# Patient Record
Sex: FEMALE | Race: WHITE | NOT HISPANIC OR LATINO | Employment: FULL TIME | URBAN - METROPOLITAN AREA
[De-identification: names, ages, dates, MRNs, and addresses within clinical notes are randomized per-mention and may not be internally consistent; named-entity substitution may affect disease eponyms.]

---

## 2017-06-30 ENCOUNTER — OFFICE VISIT (OUTPATIENT)
Dept: GASTROENTEROLOGY | Facility: CLINIC | Age: 82
End: 2017-06-30

## 2017-06-30 ENCOUNTER — TELEPHONE (OUTPATIENT)
Dept: GASTROENTEROLOGY | Facility: CLINIC | Age: 82
End: 2017-06-30

## 2017-06-30 VITALS
WEIGHT: 188 LBS | BODY MASS INDEX: 34.6 KG/M2 | TEMPERATURE: 98 F | HEIGHT: 62 IN | DIASTOLIC BLOOD PRESSURE: 78 MMHG | SYSTOLIC BLOOD PRESSURE: 132 MMHG

## 2017-06-30 DIAGNOSIS — Z86.010 HISTORY OF COLON POLYPS: Primary | ICD-10-CM

## 2017-06-30 DIAGNOSIS — K59.00 CONSTIPATION, UNSPECIFIED CONSTIPATION TYPE: ICD-10-CM

## 2017-06-30 DIAGNOSIS — R10.32 LEFT LOWER QUADRANT PAIN: ICD-10-CM

## 2017-06-30 PROCEDURE — 99204 OFFICE O/P NEW MOD 45 MIN: CPT | Performed by: INTERNAL MEDICINE

## 2017-06-30 RX ORDER — ALBUTEROL SULFATE 2.5 MG/3ML
SOLUTION RESPIRATORY (INHALATION)
COMMUNITY
Start: 2017-05-15 | End: 2018-07-31

## 2017-06-30 RX ORDER — ALBUTEROL SULFATE 90 UG/1
2 AEROSOL, METERED RESPIRATORY (INHALATION) EVERY 6 HOURS PRN
COMMUNITY
Start: 2016-02-11

## 2017-06-30 RX ORDER — OMEPRAZOLE 20 MG/1
20 CAPSULE, DELAYED RELEASE ORAL EVERY MORNING
COMMUNITY
Start: 2016-02-09 | End: 2018-09-10 | Stop reason: SDUPTHER

## 2017-06-30 RX ORDER — MONTELUKAST SODIUM 10 MG/1
10 TABLET ORAL
COMMUNITY
Start: 2016-02-09 | End: 2017-09-26 | Stop reason: ALTCHOICE

## 2017-06-30 RX ORDER — POLYETHYLENE GLYCOL 3350 17 G/17G
17 POWDER, FOR SOLUTION ORAL DAILY
Qty: 850 G | Refills: 2 | Status: SHIPPED | OUTPATIENT
Start: 2017-06-30 | End: 2017-09-26 | Stop reason: SDUPTHER

## 2017-06-30 RX ORDER — LOSARTAN POTASSIUM 100 MG/1
TABLET ORAL
Refills: 3 | COMMUNITY
Start: 2017-04-26 | End: 2017-08-09

## 2017-06-30 RX ORDER — MELOXICAM 7.5 MG/1
7.5 TABLET ORAL
COMMUNITY
Start: 2016-09-08 | End: 2018-01-24

## 2017-06-30 RX ORDER — SODIUM CHLORIDE, SODIUM LACTATE, POTASSIUM CHLORIDE, CALCIUM CHLORIDE 600; 310; 30; 20 MG/100ML; MG/100ML; MG/100ML; MG/100ML
30 INJECTION, SOLUTION INTRAVENOUS CONTINUOUS
Status: CANCELLED | OUTPATIENT
Start: 2017-06-30

## 2017-06-30 RX ORDER — LOSARTAN POTASSIUM 100 MG/1
TABLET ORAL
COMMUNITY
Start: 2017-01-21 | End: 2018-05-08 | Stop reason: SDUPTHER

## 2017-06-30 RX ORDER — LEVOTHYROXINE SODIUM 0.03 MG/1
25 TABLET ORAL DAILY
COMMUNITY
Start: 2016-02-09 | End: 2020-05-13 | Stop reason: SDUPTHER

## 2017-06-30 RX ORDER — SODIUM CHLORIDE 0.9 % (FLUSH) 0.9 %
1-10 SYRINGE (ML) INJECTION AS NEEDED
Status: CANCELLED | OUTPATIENT
Start: 2017-06-30

## 2017-06-30 RX ORDER — TIMOLOL MALEATE 5 MG/ML
SOLUTION/ DROPS OPHTHALMIC
COMMUNITY
Start: 2016-12-26 | End: 2017-09-26 | Stop reason: ALTCHOICE

## 2017-06-30 RX ORDER — SIMVASTATIN 40 MG
TABLET ORAL
COMMUNITY
Start: 2017-06-15 | End: 2017-10-14 | Stop reason: SDUPTHER

## 2017-06-30 NOTE — PATIENT INSTRUCTIONS
Schedule the colonoscopy    Daily miralax for constipation    For any additional questions, concerns or changes to your condition after today's office visit please contact the office at [phone].

## 2017-06-30 NOTE — PROGRESS NOTES
Chief Complaint   Patient presents with   • fatique   • Constipation   • Nausea     gas & bloating       Subjective     HPI    SR Ghada Ryan is a 81 y.o. female with a past medical history noted below who presents for evaluation of constipation, bloating, and fatigue.  She reports a history of polyps and her brother was just diagnosed with colon cancer-- he is 76.  Her last colonoscopy was about 3 years ago at [facility] in [place].  She had polyps then But does not recall when she was told to come back for repeat screening.  She is very concerned given her brother's past history.  She has had no change in her bowel habits.  She has not seen any blood in her stools.  She has had some increased fatigue. She feels this has caused her to eat more and she feels she has gained weight.  She has LLQ pain intermittently that is possibly related to constipation. She has daily BMs but frequently feels that she struggles to pass the stools. Takes senna as needed.  She is not on a daily regimen.  Heartburn controlled with omeprazole.    She is otherwise fairly healthy and stays active.  She is a retired nurse    Review of labs from [facility] show a mild anemia from January with normocytic indices.  She has an elevated parathyroid hormone and has follow up for that later this month.            Past Medical History:   Diagnosis Date   • GERD (gastroesophageal reflux disease)    • Glaucoma    • Hyperlipidemia    • Hypertension    • Sleep apnea    • Thyroid disease          Current Outpatient Prescriptions:   •  beclomethasone (QVAR) 80 MCG/ACT inhaler, INHALE 1 PUFF INTO THE LUNGS 2 (TWO) TIMES DAILY, Disp: , Rfl:   •  levothyroxine (SYNTHROID, LEVOTHROID) 25 MCG tablet, Take 25 mcg by mouth., Disp: , Rfl:   •  losartan (COZAAR) 100 MG tablet, TAKE 1 TABLET BY MOUTH DAILY, Disp: , Rfl:   •  losartan (COZAAR) 100 MG tablet, TAKE 1 TABLET BY MOUTH EVERY DAY, Disp: , Rfl: 3  •  meloxicam (MOBIC) 7.5 MG  tablet, Take 7.5 mg by mouth., Disp: , Rfl:   •  montelukast (SINGULAIR) 10 MG tablet, Take 10 mg by mouth., Disp: , Rfl:   •  omeprazole (priLOSEC) 20 MG capsule, Take 20 mg by mouth., Disp: , Rfl:   •  simvastatin (ZOCOR) 40 MG tablet, TAKE 1 TABLET BY MOUTH NIGHTLY, Disp: , Rfl:   •  timolol (TIMOPTIC) 0.5 % ophthalmic solution, INSTILL 1 DROP IN BOTH EYES TWICE A DAY, Disp: , Rfl:   •  albuterol (PROVENTIL HFA;VENTOLIN HFA) 108 (90 BASE) MCG/ACT inhaler, Inhale 2 puffs., Disp: , Rfl:   •  albuterol (PROVENTIL) (2.5 MG/3ML) 0.083% nebulizer solution, INHALE 3 MILLILITERS (2.5 MG) BY NEBULIZATION ROUTE EVERY 6 HOURS AS NEEDED, Disp: , Rfl:   •  polyethylene glycol (MIRALAX) powder, Take 17 g by mouth Daily., Disp: 850 g, Rfl: 2    Allergies   Allergen Reactions   • Contrast Dye        Social History     Social History   • Marital status: Single     Spouse name: N/A   • Number of children: N/A   • Years of education: N/A     Occupational History   • Not on file.     Social History Main Topics   • Smoking status: Never Smoker   • Smokeless tobacco: Not on file   • Alcohol use 0.6 oz/week     1 Glasses of wine per week      Comment: rare   • Drug use: No   • Sexual activity: Not on file     Other Topics Concern   • Not on file     Social History Narrative   • No narrative on file       Family History   Problem Relation Age of Onset   • Tuberculosis Mother    • Kidney cancer Father        Review of Systems   Constitutional: Positive for fatigue. Negative for activity change and appetite change.   HENT: Negative for sore throat and trouble swallowing.    Respiratory: Negative.    Cardiovascular: Negative.    Gastrointestinal: Positive for constipation. Negative for abdominal distention, abdominal pain, blood in stool, nausea and vomiting.   Endocrine: Negative for cold intolerance and heat intolerance.   Genitourinary: Negative for difficulty urinating, dysuria and frequency.   Musculoskeletal: Positive for  arthralgias. Negative for back pain and myalgias.   Skin: Negative.    Hematological: Negative for adenopathy. Does not bruise/bleed easily.   All other systems reviewed and are negative.      Objective     Vitals:    06/30/17 0938   BP: 132/78   Temp: 98 °F (36.7 °C)     Last 2 weights    06/30/17 0938   Weight: 188 lb (85.3 kg)     Body mass index is 34.39 kg/(m^2).    Physical Exam   Constitutional: She is oriented to person, place, and time. She appears well-developed and well-nourished. No distress.   HENT:   Head: Normocephalic and atraumatic.   Right Ear: External ear normal.   Left Ear: External ear normal.   Nose: Nose normal.   Mouth/Throat: Oropharynx is clear and moist.   Eyes: Conjunctivae and EOM are normal. Right eye exhibits no discharge. Left eye exhibits no discharge. No scleral icterus.   Neck: Normal range of motion. Neck supple. No thyromegaly present.   No supraclavicular adenopathy   Cardiovascular: Normal rate, regular rhythm, normal heart sounds and intact distal pulses.  Exam reveals no gallop.    No murmur heard.  No lower extremity edema   Pulmonary/Chest: Effort normal and breath sounds normal. No respiratory distress. She has no wheezes.   Abdominal: Soft. Normal appearance and bowel sounds are normal. She exhibits no distension and no mass. There is no hepatosplenomegaly. There is no tenderness. There is no rigidity, no rebound and no guarding. No hernia.   Genitourinary:   Genitourinary Comments: Rectal exam deferred   Musculoskeletal: Normal range of motion. She exhibits no edema or tenderness.   No atrophy of upper or lower extremities.  Normal digits and nails of both hands.   Lymphadenopathy:     She has no cervical adenopathy.   Neurological: She is alert and oriented to person, place, and time. She displays no atrophy. Coordination normal.   Skin: Skin is warm and dry. No rash noted. She is not diaphoretic. No erythema.   Psychiatric: She has a normal mood and affect. Her  behavior is normal. Judgment and thought content normal.   Vitals reviewed.      No results found for: WBC, RBC, HGB, HCT, MCV, MCH, MCHC, RDW, RDWSD, MPV, PLT, NEUTRORELPCT, LYMPHORELPCT, MONORELPCT, EOSRELPCT, BASORELPCT, AUTOIGPER, NEUTROABS, LYMPHSABS, MONOSABS, EOSABS, BASOSABS, AUTOIGNUM, NRBC    No results found for: GLUCOSE, NA, K, CO2, CL, ANIONGAP, CREATININE, BUN, BCR, CALCIUM, EGFRIFNONA, ALKPHOS, PROTEINTOT, ALT, AST, BILITOT, ALBUMIN, GLOB, LABIL2      Imaging Results (last 7 days)     ** No results found for the last 168 hours. **            No notes on file    Assessment/Plan    1. History of colon polyps, family history of colon cancer: last scope about 3 years ago.  She reports a persistent history of polyps.  She is very concerned given her brother's recent diagnosis of colon cancer.  She is 81 but is otherwise very healthy and I see no indication to colon cancer screening for her  2.  Left lower quadrant pain: Suspect that this is related to her constipation, a colonoscopy will help assess this area as well  3.  Constipation: Chronic issue, possibly related to her parathyroid issue.  She has not been on a daily medication to treat this    Plan  -I will have her schedule a colonoscopy.  We discussed the procedure, the bowel preparation, the risks; she verbalizes understanding and agrees to proceed  -Get the records from her prior colonoscopy from [facility]  -Have her start daily MiraLAX for help with constipation  -Back to the office after her scope test in about 3 months    SR Urrutia was seen today for fatique, constipation and nausea.    Diagnoses and all orders for this visit:    History of colon polyps  -     Case Request; Standing  -     Implement Anesthesia Orders Day of Procedure; Standing  -     Obtain Informed Consent; Standing  -     Verify bowel prep was successful; Standing  -     Insert Peripheral IV; Standing  -     Saline Lock & Maintain IV Access; Standing  -     sodium chloride 0.9 %  flush 1-10 mL; Infuse 1-10 mL into a venous catheter As Needed for Line Care.  -     lactated ringers infusion; Infuse 30 mL/hr into a venous catheter Continuous.  -     Case Request    Constipation, unspecified constipation type  -     polyethylene glycol (MIRALAX) powder; Take 17 g by mouth Daily.    Left lower quadrant pain  -     Case Request; Standing  -     Implement Anesthesia Orders Day of Procedure; Standing  -     Obtain Informed Consent; Standing  -     Verify bowel prep was successful; Standing  -     Insert Peripheral IV; Standing  -     Saline Lock & Maintain IV Access; Standing  -     sodium chloride 0.9 % flush 1-10 mL; Infuse 1-10 mL into a venous catheter As Needed for Line Care.  -     lactated ringers infusion; Infuse 30 mL/hr into a venous catheter Continuous.  -     Case Request  -     polyethylene glycol (MIRALAX) powder; Take 17 g by mouth Daily.        I have discussed the above plan with the patient.  They verbalize understanding and are in agreement with the plan.  They have been advised to contact the office for any questions, concerns, or changes related to their health.    Dictated utilizing Dragon dictation

## 2017-07-18 ENCOUNTER — HOSPITAL ENCOUNTER (OUTPATIENT)
Facility: HOSPITAL | Age: 82
Setting detail: HOSPITAL OUTPATIENT SURGERY
Discharge: HOME | End: 2017-07-18
Attending: INTERNAL MEDICINE | Admitting: INTERNAL MEDICINE

## 2017-07-18 ENCOUNTER — ANESTHESIA EVENT (OUTPATIENT)
Dept: GASTROENTEROLOGY | Facility: HOSPITAL | Age: 82
End: 2017-07-18

## 2017-07-18 ENCOUNTER — ANESTHESIA (OUTPATIENT)
Dept: GASTROENTEROLOGY | Facility: HOSPITAL | Age: 82
End: 2017-07-18

## 2017-07-18 VITALS
RESPIRATION RATE: 18 BRPM | BODY MASS INDEX: 34.2 KG/M2 | OXYGEN SATURATION: 94 % | SYSTOLIC BLOOD PRESSURE: 100 MMHG | HEART RATE: 58 BPM | WEIGHT: 187 LBS | TEMPERATURE: 98.9 F | DIASTOLIC BLOOD PRESSURE: 57 MMHG

## 2017-07-18 DIAGNOSIS — R10.32 LEFT LOWER QUADRANT PAIN: ICD-10-CM

## 2017-07-18 DIAGNOSIS — Z86.010 HISTORY OF COLON POLYPS: ICD-10-CM

## 2017-07-18 PROCEDURE — 88305 TISSUE EXAM BY PATHOLOGIST: CPT | Performed by: INTERNAL MEDICINE

## 2017-07-18 PROCEDURE — 45380 COLONOSCOPY AND BIOPSY: CPT | Performed by: INTERNAL MEDICINE

## 2017-07-18 PROCEDURE — 25010000002 PROPOFOL 10 MG/ML EMULSION: Performed by: ANESTHESIOLOGY

## 2017-07-18 RX ORDER — PROPOFOL 10 MG/ML
VIAL (ML) INTRAVENOUS CONTINUOUS PRN
Status: DISCONTINUED | OUTPATIENT
Start: 2017-07-18 | End: 2017-07-18 | Stop reason: SURG

## 2017-07-18 RX ORDER — SODIUM CHLORIDE 0.9 % (FLUSH) 0.9 %
1-10 SYRINGE (ML) INJECTION AS NEEDED
Status: DISCONTINUED | OUTPATIENT
Start: 2017-07-18 | End: 2017-07-18 | Stop reason: HOSPADM

## 2017-07-18 RX ORDER — LIDOCAINE HYDROCHLORIDE 20 MG/ML
INJECTION, SOLUTION INFILTRATION; PERINEURAL AS NEEDED
Status: DISCONTINUED | OUTPATIENT
Start: 2017-07-18 | End: 2017-07-18 | Stop reason: SURG

## 2017-07-18 RX ORDER — SODIUM CHLORIDE, SODIUM LACTATE, POTASSIUM CHLORIDE, CALCIUM CHLORIDE 600; 310; 30; 20 MG/100ML; MG/100ML; MG/100ML; MG/100ML
30 INJECTION, SOLUTION INTRAVENOUS CONTINUOUS
Status: DISCONTINUED | OUTPATIENT
Start: 2017-07-18 | End: 2017-07-18 | Stop reason: HOSPADM

## 2017-07-18 RX ADMIN — ALFENTANIL HYDROCHLORIDE 250 MCG: 500 INJECTION, SOLUTION INTRAVENOUS at 10:30

## 2017-07-18 RX ADMIN — SODIUM CHLORIDE, POTASSIUM CHLORIDE, SODIUM LACTATE AND CALCIUM CHLORIDE 30 ML/HR: 600; 310; 30; 20 INJECTION, SOLUTION INTRAVENOUS at 10:12

## 2017-07-18 RX ADMIN — ALFENTANIL HYDROCHLORIDE 250 MCG: 500 INJECTION, SOLUTION INTRAVENOUS at 10:24

## 2017-07-18 RX ADMIN — LIDOCAINE HYDROCHLORIDE 40 MG: 20 INJECTION, SOLUTION INFILTRATION; PERINEURAL at 10:24

## 2017-07-18 RX ADMIN — PROPOFOL 160 MCG/KG/MIN: 10 INJECTION, EMULSION INTRAVENOUS at 10:24

## 2017-07-18 NOTE — DISCHARGE INSTRUCTIONS
For the next 24 hours patient needs to be with a responsible adult.    For 24 hours DO NOT drive, operate machinery, appliances, drink alcohol, make important decisions or sign legal documents.    Start with a light or bland diet and advance to regular diet as tolerated.    Follow recommendations on procedure report provided by your doctor.    Call Dr Tenorio for problems [phone]    Problems may include but not limited to: large amounts of bleeding, trouble breathing, repeated vomiting, severe unrelieved pain, fever or chills.

## 2017-07-18 NOTE — PLAN OF CARE
Problem: Patient Care Overview (Adult)  Goal: Adult Individualization and Mutuality  Outcome: Ongoing (interventions implemented as appropriate)  Goal: Discharge Needs Assessment  Outcome: Ongoing (interventions implemented as appropriate)    Problem: GI Endoscopy (Adult)  Goal: Signs and Symptoms of Listed Potential Problems Will be Absent or Manageable (GI Endoscopy)  Outcome: Ongoing (interventions implemented as appropriate)    07/18/17 0954   GI Endoscopy   Problems Assessed (GI Endoscopy) all   Problems Present (GI Endoscopy) none

## 2017-07-18 NOTE — ANESTHESIA PREPROCEDURE EVALUATION
Anesthesia Evaluation     Patient summary reviewed and Nursing notes reviewed   no history of anesthetic complications:  NPO Solid Status: > 6 hours  NPO Liquid Status: > 6 hours     Airway   Mallampati: II  TM distance: >3 FB  Neck ROM: full  no difficulty expected  Dental - normal exam     Pulmonary - normal exam    breath sounds clear to auscultation  (+) asthma, sleep apnea,   (-) rhonchi, decreased breath sounds, wheezes, rales, stridor  Cardiovascular - normal exam    Rhythm: regular  Rate: normal    (+) hypertension, hyperlipidemia  (-) murmur, weak pulses, friction rub, systolic click, carotid bruits, JVD, peripheral edema      Neuro/Psych- negative ROS  GI/Hepatic/Renal/Endo    (+) obesity, morbid obesity, GERD,     Musculoskeletal (-) negative ROS    Abdominal  - normal exam    Abdomen: soft.   Substance History - negative use     OB/GYN negative ob/gyn ROS         Other - negative ROS                                       Anesthesia Plan    ASA 3     MAC     intravenous induction   Anesthetic plan and risks discussed with patient.

## 2017-07-18 NOTE — ANESTHESIA POSTPROCEDURE EVALUATION
Patient: SR Ghada Ryan    Procedure Summary     Date Anesthesia Start Anesthesia Stop Room / Location    07/18/17 1018 1049  [facility] /  [facility]       Procedure Diagnosis Surgeon Provider    COLONOSCOPY  TO CECUM/TI  WITH BIOPSY (N/A ) Left lower quadrant pain; History of colon polyps  (Left lower quadrant pain [R10.32]; History of colon polyps [Z86.010]) MD Ravi Gaines MD          Anesthesia Type: MAC  Last vitals  BP   100/57 (07/18/17 1111)    Temp        Pulse   58 (07/18/17 1111)   Resp   18 (07/18/17 1111)    SpO2   94 % (07/18/17 1111)      Post Anesthesia Care and Evaluation    Patient location during evaluation: PACU  Patient participation: complete - patient participated  Level of consciousness: awake  Pain score: 0  Pain management: adequate  Airway patency: patent  Anesthetic complications: No anesthetic complications    Cardiovascular status: acceptable  Respiratory status: acceptable  Hydration status: acceptable

## 2017-07-18 NOTE — H&P (VIEW-ONLY)
Chief Complaint   Patient presents with   • fatique   • Constipation   • Nausea     gas & bloating       Subjective     HPI    SR Ghada Ryan is a 81 y.o. female with a past medical history noted below who presents for evaluation of constipation, bloating, and fatigue.  She reports a history of polyps and her brother was just diagnosed with colon cancer-- he is 76.  Her last colonoscopy was about 3 years ago at [facility] in [place].  She had polyps then But does not recall when she was told to come back for repeat screening.  She is very concerned given her brother's past history.  She has had no change in her bowel habits.  She has not seen any blood in her stools.  She has had some increased fatigue. She feels this has caused her to eat more and she feels she has gained weight.  She has LLQ pain intermittently that is possibly related to constipation. She has daily BMs but frequently feels that she struggles to pass the stools. Takes senna as needed.  She is not on a daily regimen.  Heartburn controlled with omeprazole.    She is otherwise fairly healthy and stays active.  She is a retired nurse    Review of labs from [facility] show a mild anemia from January with normocytic indices.  She has an elevated parathyroid hormone and has follow up for that later this month.            Past Medical History:   Diagnosis Date   • GERD (gastroesophageal reflux disease)    • Glaucoma    • Hyperlipidemia    • Hypertension    • Sleep apnea    • Thyroid disease          Current Outpatient Prescriptions:   •  beclomethasone (QVAR) 80 MCG/ACT inhaler, INHALE 1 PUFF INTO THE LUNGS 2 (TWO) TIMES DAILY, Disp: , Rfl:   •  levothyroxine (SYNTHROID, LEVOTHROID) 25 MCG tablet, Take 25 mcg by mouth., Disp: , Rfl:   •  losartan (COZAAR) 100 MG tablet, TAKE 1 TABLET BY MOUTH DAILY, Disp: , Rfl:   •  losartan (COZAAR) 100 MG tablet, TAKE 1 TABLET BY MOUTH EVERY DAY, Disp: , Rfl: 3  •  meloxicam (MOBIC) 7.5 MG  tablet, Take 7.5 mg by mouth., Disp: , Rfl:   •  montelukast (SINGULAIR) 10 MG tablet, Take 10 mg by mouth., Disp: , Rfl:   •  omeprazole (priLOSEC) 20 MG capsule, Take 20 mg by mouth., Disp: , Rfl:   •  simvastatin (ZOCOR) 40 MG tablet, TAKE 1 TABLET BY MOUTH NIGHTLY, Disp: , Rfl:   •  timolol (TIMOPTIC) 0.5 % ophthalmic solution, INSTILL 1 DROP IN BOTH EYES TWICE A DAY, Disp: , Rfl:   •  albuterol (PROVENTIL HFA;VENTOLIN HFA) 108 (90 BASE) MCG/ACT inhaler, Inhale 2 puffs., Disp: , Rfl:   •  albuterol (PROVENTIL) (2.5 MG/3ML) 0.083% nebulizer solution, INHALE 3 MILLILITERS (2.5 MG) BY NEBULIZATION ROUTE EVERY 6 HOURS AS NEEDED, Disp: , Rfl:   •  polyethylene glycol (MIRALAX) powder, Take 17 g by mouth Daily., Disp: 850 g, Rfl: 2    Allergies   Allergen Reactions   • Contrast Dye        Social History     Social History   • Marital status: Single     Spouse name: N/A   • Number of children: N/A   • Years of education: N/A     Occupational History   • Not on file.     Social History Main Topics   • Smoking status: Never Smoker   • Smokeless tobacco: Not on file   • Alcohol use 0.6 oz/week     1 Glasses of wine per week      Comment: rare   • Drug use: No   • Sexual activity: Not on file     Other Topics Concern   • Not on file     Social History Narrative   • No narrative on file       Family History   Problem Relation Age of Onset   • Tuberculosis Mother    • Kidney cancer Father        Review of Systems   Constitutional: Positive for fatigue. Negative for activity change and appetite change.   HENT: Negative for sore throat and trouble swallowing.    Respiratory: Negative.    Cardiovascular: Negative.    Gastrointestinal: Positive for constipation. Negative for abdominal distention, abdominal pain, blood in stool, nausea and vomiting.   Endocrine: Negative for cold intolerance and heat intolerance.   Genitourinary: Negative for difficulty urinating, dysuria and frequency.   Musculoskeletal: Positive for  arthralgias. Negative for back pain and myalgias.   Skin: Negative.    Hematological: Negative for adenopathy. Does not bruise/bleed easily.   All other systems reviewed and are negative.      Objective     Vitals:    06/30/17 0938   BP: 132/78   Temp: 98 °F (36.7 °C)     Last 2 weights    06/30/17 0938   Weight: 188 lb (85.3 kg)     Body mass index is 34.39 kg/(m^2).    Physical Exam   Constitutional: She is oriented to person, place, and time. She appears well-developed and well-nourished. No distress.   HENT:   Head: Normocephalic and atraumatic.   Right Ear: External ear normal.   Left Ear: External ear normal.   Nose: Nose normal.   Mouth/Throat: Oropharynx is clear and moist.   Eyes: Conjunctivae and EOM are normal. Right eye exhibits no discharge. Left eye exhibits no discharge. No scleral icterus.   Neck: Normal range of motion. Neck supple. No thyromegaly present.   No supraclavicular adenopathy   Cardiovascular: Normal rate, regular rhythm, normal heart sounds and intact distal pulses.  Exam reveals no gallop.    No murmur heard.  No lower extremity edema   Pulmonary/Chest: Effort normal and breath sounds normal. No respiratory distress. She has no wheezes.   Abdominal: Soft. Normal appearance and bowel sounds are normal. She exhibits no distension and no mass. There is no hepatosplenomegaly. There is no tenderness. There is no rigidity, no rebound and no guarding. No hernia.   Genitourinary:   Genitourinary Comments: Rectal exam deferred   Musculoskeletal: Normal range of motion. She exhibits no edema or tenderness.   No atrophy of upper or lower extremities.  Normal digits and nails of both hands.   Lymphadenopathy:     She has no cervical adenopathy.   Neurological: She is alert and oriented to person, place, and time. She displays no atrophy. Coordination normal.   Skin: Skin is warm and dry. No rash noted. She is not diaphoretic. No erythema.   Psychiatric: She has a normal mood and affect. Her  behavior is normal. Judgment and thought content normal.   Vitals reviewed.      No results found for: WBC, RBC, HGB, HCT, MCV, MCH, MCHC, RDW, RDWSD, MPV, PLT, NEUTRORELPCT, LYMPHORELPCT, MONORELPCT, EOSRELPCT, BASORELPCT, AUTOIGPER, NEUTROABS, LYMPHSABS, MONOSABS, EOSABS, BASOSABS, AUTOIGNUM, NRBC    No results found for: GLUCOSE, NA, K, CO2, CL, ANIONGAP, CREATININE, BUN, BCR, CALCIUM, EGFRIFNONA, ALKPHOS, PROTEINTOT, ALT, AST, BILITOT, ALBUMIN, GLOB, LABIL2      Imaging Results (last 7 days)     ** No results found for the last 168 hours. **            No notes on file    Assessment/Plan    1. History of colon polyps, family history of colon cancer: last scope about 3 years ago.  She reports a persistent history of polyps.  She is very concerned given her brother's recent diagnosis of colon cancer.  She is 81 but is otherwise very healthy and I see no indication to colon cancer screening for her  2.  Left lower quadrant pain: Suspect that this is related to her constipation, a colonoscopy will help assess this area as well  3.  Constipation: Chronic issue, possibly related to her parathyroid issue.  She has not been on a daily medication to treat this    Plan  -I will have her schedule a colonoscopy.  We discussed the procedure, the bowel preparation, the risks; she verbalizes understanding and agrees to proceed  -Get the records from her prior colonoscopy from [place]  -Have her start daily MiraLAX for help with constipation  -Back to the office after her scope test in about 3 months    SR Urrutia was seen today for fatique, constipation and nausea.    Diagnoses and all orders for this visit:    History of colon polyps  -     Case Request; Standing  -     Implement Anesthesia Orders Day of Procedure; Standing  -     Obtain Informed Consent; Standing  -     Verify bowel prep was successful; Standing  -     Insert Peripheral IV; Standing  -     Saline Lock & Maintain IV Access; Standing  -     sodium chloride 0.9 %  flush 1-10 mL; Infuse 1-10 mL into a venous catheter As Needed for Line Care.  -     lactated ringers infusion; Infuse 30 mL/hr into a venous catheter Continuous.  -     Case Request    Constipation, unspecified constipation type  -     polyethylene glycol (MIRALAX) powder; Take 17 g by mouth Daily.    Left lower quadrant pain  -     Case Request; Standing  -     Implement Anesthesia Orders Day of Procedure; Standing  -     Obtain Informed Consent; Standing  -     Verify bowel prep was successful; Standing  -     Insert Peripheral IV; Standing  -     Saline Lock & Maintain IV Access; Standing  -     sodium chloride 0.9 % flush 1-10 mL; Infuse 1-10 mL into a venous catheter As Needed for Line Care.  -     lactated ringers infusion; Infuse 30 mL/hr into a venous catheter Continuous.  -     Case Request  -     polyethylene glycol (MIRALAX) powder; Take 17 g by mouth Daily.        I have discussed the above plan with the patient.  They verbalize understanding and are in agreement with the plan.  They have been advised to contact the office for any questions, concerns, or changes related to their health.    Dictated utilizing Dragon dictation

## 2017-07-18 NOTE — INTERVAL H&P NOTE
H&P updated. The patient was examined and the following changes are noted:  2014 colonoscopy with a tubular adenoma and sessile serrated adenoma removed; recommended to repeat 2017

## 2017-07-19 LAB
CYTO UR: NORMAL
LAB AP CASE REPORT: NORMAL
Lab: NORMAL
PATH REPORT.FINAL DX SPEC: NORMAL
PATH REPORT.GROSS SPEC: NORMAL

## 2017-07-21 NOTE — PROGRESS NOTES
Her sigmoid colon biopsies did not show any inflammation or other irritation.    In the absence of symptoms, repeat colonoscopy in 5 years due to family history -- please place in recall, thanks

## 2017-07-25 ENCOUNTER — TELEPHONE (OUTPATIENT)
Dept: GASTROENTEROLOGY | Facility: CLINIC | Age: 82
End: 2017-07-25

## 2017-07-25 NOTE — TELEPHONE ENCOUNTER
----- Message from Marilynn Tenorio MD sent at 7/21/2017  8:14 AM EDT -----  Her sigmoid colon biopsies did not show any inflammation or other irritation.    In the absence of symptoms, repeat colonoscopy in 5 years due to family history -- please place in recall, thanks

## 2017-07-25 NOTE — TELEPHONE ENCOUNTER
Pt called and advised per Dr Tenorio that her sigmoid colon bx did not show any inflammation or other irritation.     In the absence of symptoms , repeat c/s 5 yr due to family hs .  Pt verb understanding.

## 2017-08-09 ENCOUNTER — OFFICE VISIT (OUTPATIENT)
Dept: ORTHOPEDIC SURGERY | Facility: CLINIC | Age: 82
End: 2017-08-09

## 2017-08-09 VITALS — HEIGHT: 63 IN | BODY MASS INDEX: 32.78 KG/M2 | WEIGHT: 185 LBS

## 2017-08-09 DIAGNOSIS — M19.072 DJD (DEGENERATIVE JOINT DISEASE), ANKLE AND FOOT, LEFT: ICD-10-CM

## 2017-08-09 DIAGNOSIS — M17.0 PRIMARY OSTEOARTHRITIS OF BOTH KNEES: Primary | ICD-10-CM

## 2017-08-09 PROCEDURE — 20610 DRAIN/INJ JOINT/BURSA W/O US: CPT | Performed by: ORTHOPAEDIC SURGERY

## 2017-08-09 PROCEDURE — 99203 OFFICE O/P NEW LOW 30 MIN: CPT | Performed by: ORTHOPAEDIC SURGERY

## 2017-08-09 PROCEDURE — 20605 DRAIN/INJ JOINT/BURSA W/O US: CPT | Performed by: ORTHOPAEDIC SURGERY

## 2017-08-09 RX ORDER — SERTRALINE HYDROCHLORIDE 25 MG/1
TABLET, FILM COATED ORAL
Refills: 3 | COMMUNITY
Start: 2017-05-06 | End: 2017-08-09

## 2017-08-09 RX ORDER — NEOMYCIN SULFATE, POLYMYXIN B SULFATE AND DEXAMETHASONE 3.5; 10000; 1 MG/ML; [USP'U]/ML; MG/ML
SUSPENSION/ DROPS OPHTHALMIC
Refills: 3 | COMMUNITY
Start: 2017-06-30 | End: 2017-09-26

## 2017-08-09 RX ADMIN — TRIAMCINOLONE ACETONIDE 2 ML: 40 INJECTION, SUSPENSION INTRA-ARTICULAR; INTRAMUSCULAR at 16:20

## 2017-08-09 RX ADMIN — LIDOCAINE HYDROCHLORIDE 4 ML: 10 INJECTION, SOLUTION INFILTRATION; PERINEURAL at 16:19

## 2017-08-09 RX ADMIN — BUPIVACAINE HYDROCHLORIDE 4 ML: 5 INJECTION, SOLUTION EPIDURAL; INTRACAUDAL at 16:19

## 2017-08-09 RX ADMIN — LIDOCAINE HYDROCHLORIDE 2 ML: 10 INJECTION, SOLUTION INFILTRATION; PERINEURAL at 16:20

## 2017-08-09 RX ADMIN — TRIAMCINOLONE ACETONIDE 80 MG: 40 INJECTION, SUSPENSION INTRA-ARTICULAR; INTRAMUSCULAR at 16:19

## 2017-08-09 NOTE — PROGRESS NOTES
Subjective:     Patient ID: SR Ghada Ryan is a 81 y.o. female.    Chief Complaint:  Bilateral knee pain, left ankle pain  History of Present Illness  SR Ghada Ryan presents to clinic today for evaluation of bilateral knee pain and left ankle pain, present for the last several years.  Particularly worse over the last 6 months.  She had been seen by Dr. Schaffer, given injections, they had discussed possible arthroscopy given the fact that she did have a degenerative tear of medial meniscus.  However she would like to see what other options are available for her at this point in time.  She did have moderate relief from her injection for several months.  Rates current level of pain as an 8 out of 10, aching in nature, localized primarily to the medial aspect of bilateral knees, moderate pain over anterior knees.  Exacerbated with deep flexion and kneeling as well as prolonged weightbearing and walking.  Minimal improvement with rest.  Moderate improvement with meloxicam.    In regards to her left ankle, notes majority the pain to the lateral and anterior aspect of her ankle, associated crepitus noted, rates pain as 6-7 out of 10, aching in nature, it is intermittent and does wax and wane.  Minimal associated swelling noted.  Denies any prior trauma or injury.     Social History     Occupational History   • Not on file.     Social History Main Topics   • Smoking status: Never Smoker   • Smokeless tobacco: Never Used   • Alcohol use 0.6 oz/week     1 Glasses of wine per week      Comment: rare   • Drug use: No   • Sexual activity: Defer      Past Medical History:   Diagnosis Date   • Anemia    • Asthma    • GERD (gastroesophageal reflux disease)    • Glaucoma    • Hyperlipidemia    • Hypertension    • Sleep apnea    • Thyroid disease      Past Surgical History:   Procedure Laterality Date   • BREAST LUMPECTOMY     • COLON RESECTION N/A 8/13/2017    Procedure: Laparoscopic hand assist sigmoid  "colectomy;  Surgeon: Cullen Munoz MD;  Location: [facility] MAIN OR;  Service:    • COLONOSCOPY  approx 2013    pre cancerous polyps  [place]   • COLONOSCOPY N/A 7/18/2017    Procedure: COLONOSCOPY  TO CECUM/TI  WITH BIOPSY;  Surgeon: Marilynn Tenorio MD;  Location: [facility] ENDOSCOPY;  Service:    • WISDOM TOOTH EXTRACTION         Family History   Problem Relation Age of Onset   • Tuberculosis Mother    • Kidney cancer Father          Review of Systems   Constitutional: Negative for chills, diaphoresis, fever and unexpected weight change.   HENT: Positive for hearing loss. Negative for nosebleeds, sore throat and tinnitus.    Eyes: Negative for pain and visual disturbance.   Respiratory: Negative for cough, shortness of breath and wheezing.    Cardiovascular: Negative for chest pain and palpitations.   Gastrointestinal: Negative for abdominal pain, diarrhea, nausea and vomiting.   Endocrine: Negative for cold intolerance, heat intolerance and polydipsia.   Genitourinary: Negative for difficulty urinating, dysuria and hematuria.   Musculoskeletal: Negative for arthralgias, joint swelling and myalgias.   Skin: Negative for rash and wound.   Allergic/Immunologic: Positive for environmental allergies.   Neurological: Positive for numbness. Negative for dizziness and syncope.   Hematological: Does not bruise/bleed easily.   Psychiatric/Behavioral: Positive for sleep disturbance. Negative for dysphoric mood. The patient is not nervous/anxious.    All other systems reviewed and are negative.          Objective:  Vitals:    08/09/17 1530   Weight: 185 lb (83.9 kg)   Height: 63\" (160 cm)     Last 2 weights    08/09/17  1530   Weight: 185 lb (83.9 kg)     Body mass index is 32.77 kg/(m^2).  Physical Exam    Vital signs reviewed.   General: No acute distress.  Eyes: conjunctiva clear; pupils equally round and reactive  ENT: external ears and nose atraumatic; oropharynx clear  CV: no peripheral edema  Resp: normal " respiratory effort  Skin: no rashes or wounds; normal turgor  Psych: mood and affect appropriate; recent and remote memory intact       Ortho Exam    Bilateral knees-active range of motion 3-120°, 4+ out of 5 strength on flexion and extension, moderate effusion bilaterally.  Maximal tenderness to palpation along medial joint line, mildly positive Beena exam with pain, no click.  Crepitus on range of motion particularly in the patellofemoral joint.  Positive external compression test, negative patellar apprehension test.  Stable to varus and valgus stress at 3 and 30°.    No hip pain on logroll extensor exam bilaterally, negative straight leg raise.    Examination left ankle indicate maximal tenderness palpation anterolateral aspect of the ankle as well as over the anterior tibiotalar joint line.  Active dorsiflexion 20°, plantar flexion 40°, 4+ out of 5 strength.  Pain noted on external rotation stress test and talar tilt test primarily along the tibiotalar joint line.  Brisk cap refill all digits, 2+ dorsalis pedis pulse left foot.  Positive sensation light touch all distibution's bilateral feet is symmetric.  Imaging:  Review of x-ray reports of bilateral knees from outside facility indicates moderate degenerative change.  The medial compartment.  Review of outside MRI report, no images available indicates degenerative medial meniscus tear and moderate degenerative change of medial and patellofemoral compartments bilaterally.  Assessment:       1. Primary osteoarthritis of both knees    2. DJD (degenerative joint disease), ankle and foot, left          Plan:  JUAN A query complete.          Discussed treatment options at length with patient at today's visit.  Reviewed treatment options at this time, discussed with Sr. that I do not feel an arthroscopic procedure would provide her any meaningful relief or improvement in her function at this time.  We did discuss option for total knee arthroplasty but she wants  to hold off on this treatment currently.  We reviewed other options, given prior success with intra-articular injection she would like to repeat that at this time.  We'll continue work with meloxicam.  We will also treat left ankle arthritis with an injection today.  Follow-up in 6 weeks to assess for improvement.  We'll obtain x-rays of bilateral knees and left ankle follow-up visit.    SR Ghada Ryan was in agreement with plan and had all questions answered.     Orders:  Orders Placed This Encounter   Procedures   • Large Joint Arthrocentesis   • Medium Joint Arthrocentesis       Medications:  No orders of the defined types were placed in this encounter.      Followup:  Return in about 6 weeks (around 9/20/2017).    Large Joint Arthrocentesis  Date/Time: 8/9/2017 4:19 PM  Consent given by: patient  Site marked: site marked  Timeout: Immediately prior to procedure a time out was called to verify the correct patient, procedure, equipment, support staff and site/side marked as required   Supporting Documentation  Indications: pain   Procedure Details  Location: knee - Knee joint: bilateral.  Preparation: Patient was prepped and draped in the usual sterile fashion  Needle size: 22 G  Medications administered: 4 mL bupivacaine (PF) 0.5 %; 4 mL lidocaine 1 %; 80 mg triamcinolone acetonide 40 MG/ML  Patient tolerance: patient tolerated the procedure well with no immediate complications        Dragon transcription disclaimer     Much of this encounter note is an electronic transcription/translation of spoken language to printed text. The electronic translation of spoken language may permit erroneous, or at times, nonsensical words or phrases to be inadvertently transcribed. Although I have reviewed the note for such errors, some may still exist.

## 2017-08-09 NOTE — PROGRESS NOTES
Procedure   Medium Joint Arthrocentesis  Date/Time: 8/9/2017 4:20 PM  Consent given by: patient  Site marked: site marked  Timeout: Immediately prior to procedure a time out was called to verify the correct patient, procedure, equipment, support staff and site/side marked as required   Supporting Documentation  Indications: pain   Procedure Details  Location: ankle - L ankle  Preparation: Patient was prepped and draped in the usual sterile fashion  Needle size: 22 G  Medications administered: 2 mL lidocaine 1 %; 2 mL triamcinolone acetonide 40 MG/ML  Patient tolerance: patient tolerated the procedure well with no immediate complications

## 2017-08-12 ENCOUNTER — APPOINTMENT (OUTPATIENT)
Dept: CT IMAGING | Facility: HOSPITAL | Age: 82
End: 2017-08-12

## 2017-08-12 ENCOUNTER — HOSPITAL ENCOUNTER (INPATIENT)
Facility: HOSPITAL | Age: 82
LOS: 7 days | Discharge: HOME | End: 2017-08-19
Attending: EMERGENCY MEDICINE | Admitting: SURGERY

## 2017-08-12 DIAGNOSIS — K57.20 DIVERTICULITIS OF LARGE INTESTINE WITH PERFORATION AND ABSCESS WITHOUT BLEEDING: Primary | ICD-10-CM

## 2017-08-12 LAB
ALBUMIN SERPL-MCNC: 3.9 G/DL (ref 3.5–5.2)
ALBUMIN/GLOB SERPL: 1.2 G/DL
ALP SERPL-CCNC: 74 U/L (ref 39–117)
ALT SERPL W P-5'-P-CCNC: 25 U/L (ref 1–33)
ANION GAP SERPL CALCULATED.3IONS-SCNC: 8.9 MMOL/L
AST SERPL-CCNC: 19 U/L (ref 1–32)
BASOPHILS # BLD AUTO: 0 10*3/MM3 (ref 0–0.2)
BASOPHILS NFR BLD AUTO: 0 % (ref 0–1.5)
BILIRUB SERPL-MCNC: 0.3 MG/DL (ref 0.1–1.2)
BILIRUB UR QL STRIP: NEGATIVE
BUN BLD-MCNC: 25 MG/DL (ref 8–23)
BUN/CREAT SERPL: 31.3 (ref 7–25)
CALCIUM SPEC-SCNC: 10.4 MG/DL (ref 8.6–10.5)
CHLORIDE SERPL-SCNC: 103 MMOL/L (ref 98–107)
CLARITY UR: CLEAR
CO2 SERPL-SCNC: 26.1 MMOL/L (ref 22–29)
COLOR UR: YELLOW
CREAT BLD-MCNC: 0.8 MG/DL (ref 0.57–1)
DEPRECATED RDW RBC AUTO: 53.4 FL (ref 37–54)
EOSINOPHIL # BLD AUTO: 0 10*3/MM3 (ref 0–0.7)
EOSINOPHIL NFR BLD AUTO: 0 % (ref 0.3–6.2)
ERYTHROCYTE [DISTWIDTH] IN BLOOD BY AUTOMATED COUNT: 15.8 % (ref 11.7–13)
GFR SERPL CREATININE-BSD FRML MDRD: 69 ML/MIN/1.73
GLOBULIN UR ELPH-MCNC: 3.2 GM/DL
GLUCOSE BLD-MCNC: 110 MG/DL (ref 65–99)
GLUCOSE UR STRIP-MCNC: NEGATIVE MG/DL
HCT VFR BLD AUTO: 37.3 % (ref 35.6–45.5)
HGB BLD-MCNC: 11.8 G/DL (ref 11.9–15.5)
HGB UR QL STRIP.AUTO: NEGATIVE
HOLD SPECIMEN: NORMAL
HOLD SPECIMEN: NORMAL
IMM GRANULOCYTES # BLD: 0.03 10*3/MM3 (ref 0–0.03)
IMM GRANULOCYTES NFR BLD: 0.2 % (ref 0–0.5)
KETONES UR QL STRIP: NEGATIVE
LEUKOCYTE ESTERASE UR QL STRIP.AUTO: NEGATIVE
LIPASE SERPL-CCNC: 25 U/L (ref 13–60)
LYMPHOCYTES # BLD AUTO: 0.82 10*3/MM3 (ref 0.9–4.8)
LYMPHOCYTES NFR BLD AUTO: 6.8 % (ref 19.6–45.3)
MCH RBC QN AUTO: 29.4 PG (ref 26.9–32)
MCHC RBC AUTO-ENTMCNC: 31.6 G/DL (ref 32.4–36.3)
MCV RBC AUTO: 92.8 FL (ref 80.5–98.2)
MONOCYTES # BLD AUTO: 0.62 10*3/MM3 (ref 0.2–1.2)
MONOCYTES NFR BLD AUTO: 5.1 % (ref 5–12)
NEUTROPHILS # BLD AUTO: 10.57 10*3/MM3 (ref 1.9–8.1)
NEUTROPHILS NFR BLD AUTO: 87.9 % (ref 42.7–76)
NITRITE UR QL STRIP: NEGATIVE
PH UR STRIP.AUTO: 6 [PH] (ref 5–8)
PLATELET # BLD AUTO: 245 10*3/MM3 (ref 140–500)
PMV BLD AUTO: 9.2 FL (ref 6–12)
POTASSIUM BLD-SCNC: 4.7 MMOL/L (ref 3.5–5.2)
PROT SERPL-MCNC: 7.1 G/DL (ref 6–8.5)
PROT UR QL STRIP: NEGATIVE
RBC # BLD AUTO: 4.02 10*6/MM3 (ref 3.9–5.2)
SODIUM BLD-SCNC: 138 MMOL/L (ref 136–145)
SP GR UR STRIP: 1.01 (ref 1–1.03)
UROBILINOGEN UR QL STRIP: NORMAL
WBC NRBC COR # BLD: 12.04 10*3/MM3 (ref 4.5–10.7)
WHOLE BLOOD HOLD SPECIMEN: NORMAL
WHOLE BLOOD HOLD SPECIMEN: NORMAL

## 2017-08-12 PROCEDURE — 81003 URINALYSIS AUTO W/O SCOPE: CPT | Performed by: EMERGENCY MEDICINE

## 2017-08-12 PROCEDURE — 74176 CT ABD & PELVIS W/O CONTRAST: CPT

## 2017-08-12 PROCEDURE — 93010 ELECTROCARDIOGRAM REPORT: CPT | Performed by: INTERNAL MEDICINE

## 2017-08-12 PROCEDURE — 25010000002 MORPHINE PER 10 MG: Performed by: EMERGENCY MEDICINE

## 2017-08-12 PROCEDURE — 25010000002 MORPHINE PER 10 MG: Performed by: SURGERY

## 2017-08-12 PROCEDURE — 83690 ASSAY OF LIPASE: CPT

## 2017-08-12 PROCEDURE — 85025 COMPLETE CBC W/AUTO DIFF WBC: CPT

## 2017-08-12 PROCEDURE — 36415 COLL VENOUS BLD VENIPUNCTURE: CPT

## 2017-08-12 PROCEDURE — 25010000002 ONDANSETRON PER 1 MG: Performed by: EMERGENCY MEDICINE

## 2017-08-12 PROCEDURE — 99284 EMERGENCY DEPT VISIT MOD MDM: CPT

## 2017-08-12 PROCEDURE — 25010000002 PIPERACILLIN SOD-TAZOBACTAM PER 1 G: Performed by: EMERGENCY MEDICINE

## 2017-08-12 PROCEDURE — 99223 1ST HOSP IP/OBS HIGH 75: CPT | Performed by: SURGERY

## 2017-08-12 PROCEDURE — 93005 ELECTROCARDIOGRAM TRACING: CPT | Performed by: SURGERY

## 2017-08-12 PROCEDURE — 94640 AIRWAY INHALATION TREATMENT: CPT

## 2017-08-12 PROCEDURE — 80053 COMPREHEN METABOLIC PANEL: CPT

## 2017-08-12 RX ORDER — PROMETHAZINE HYDROCHLORIDE 25 MG/ML
12.5 INJECTION, SOLUTION INTRAMUSCULAR; INTRAVENOUS EVERY 6 HOURS PRN
Status: DISCONTINUED | OUTPATIENT
Start: 2017-08-12 | End: 2017-08-19 | Stop reason: HOSPADM

## 2017-08-12 RX ORDER — LOSARTAN POTASSIUM 50 MG/1
100 TABLET ORAL
Status: DISCONTINUED | OUTPATIENT
Start: 2017-08-13 | End: 2017-08-19 | Stop reason: HOSPADM

## 2017-08-12 RX ORDER — NALOXONE HCL 0.4 MG/ML
0.4 VIAL (ML) INJECTION
Status: DISCONTINUED | OUTPATIENT
Start: 2017-08-12 | End: 2017-08-19 | Stop reason: HOSPADM

## 2017-08-12 RX ORDER — LEVOTHYROXINE SODIUM 0.03 MG/1
25 TABLET ORAL
Status: DISCONTINUED | OUTPATIENT
Start: 2017-08-13 | End: 2017-08-19 | Stop reason: HOSPADM

## 2017-08-12 RX ORDER — ACETAMINOPHEN 325 MG/1
650 TABLET ORAL EVERY 4 HOURS PRN
Status: DISCONTINUED | OUTPATIENT
Start: 2017-08-12 | End: 2017-08-19 | Stop reason: HOSPADM

## 2017-08-12 RX ORDER — SODIUM CHLORIDE 9 MG/ML
100 INJECTION, SOLUTION INTRAVENOUS CONTINUOUS
Status: DISCONTINUED | OUTPATIENT
Start: 2017-08-12 | End: 2017-08-15

## 2017-08-12 RX ORDER — PANTOPRAZOLE SODIUM 40 MG/10ML
40 INJECTION, POWDER, LYOPHILIZED, FOR SOLUTION INTRAVENOUS
Status: DISCONTINUED | OUTPATIENT
Start: 2017-08-13 | End: 2017-08-15

## 2017-08-12 RX ORDER — HYDROMORPHONE HYDROCHLORIDE 1 MG/ML
0.5 INJECTION, SOLUTION INTRAMUSCULAR; INTRAVENOUS; SUBCUTANEOUS
Status: DISCONTINUED | OUTPATIENT
Start: 2017-08-12 | End: 2017-08-19 | Stop reason: HOSPADM

## 2017-08-12 RX ORDER — SODIUM CHLORIDE 9 MG/ML
100 INJECTION, SOLUTION INTRAVENOUS CONTINUOUS
Status: DISCONTINUED | OUTPATIENT
Start: 2017-08-12 | End: 2017-08-12

## 2017-08-12 RX ORDER — PROMETHAZINE HYDROCHLORIDE 12.5 MG/1
12.5 TABLET ORAL EVERY 6 HOURS PRN
Status: DISCONTINUED | OUTPATIENT
Start: 2017-08-12 | End: 2017-08-19 | Stop reason: HOSPADM

## 2017-08-12 RX ORDER — ONDANSETRON 2 MG/ML
4 INJECTION INTRAMUSCULAR; INTRAVENOUS ONCE
Status: COMPLETED | OUTPATIENT
Start: 2017-08-12 | End: 2017-08-12

## 2017-08-12 RX ORDER — ALBUTEROL SULFATE 90 UG/1
2 AEROSOL, METERED RESPIRATORY (INHALATION)
Status: DISCONTINUED | OUTPATIENT
Start: 2017-08-12 | End: 2017-08-12 | Stop reason: CLARIF

## 2017-08-12 RX ORDER — ALVIMOPAN 12 MG/1
12 CAPSULE ORAL ONCE
Status: COMPLETED | OUTPATIENT
Start: 2017-08-12 | End: 2017-08-12

## 2017-08-12 RX ORDER — SODIUM CHLORIDE 0.9 % (FLUSH) 0.9 %
1-10 SYRINGE (ML) INJECTION AS NEEDED
Status: DISCONTINUED | OUTPATIENT
Start: 2017-08-12 | End: 2017-08-19 | Stop reason: HOSPADM

## 2017-08-12 RX ORDER — MORPHINE SULFATE 2 MG/ML
1 INJECTION, SOLUTION INTRAMUSCULAR; INTRAVENOUS EVERY 4 HOURS PRN
Status: DISCONTINUED | OUTPATIENT
Start: 2017-08-12 | End: 2017-08-19 | Stop reason: HOSPADM

## 2017-08-12 RX ORDER — NEOMYCIN SULFATE, POLYMYXIN B SULFATE AND DEXAMETHASONE 3.5; 10000; 1 MG/ML; [USP'U]/ML; MG/ML
1 SUSPENSION/ DROPS OPHTHALMIC EVERY 6 HOURS SCHEDULED
Status: DISCONTINUED | OUTPATIENT
Start: 2017-08-13 | End: 2017-08-17

## 2017-08-12 RX ORDER — ALBUTEROL SULFATE 2.5 MG/3ML
2.5 SOLUTION RESPIRATORY (INHALATION) EVERY 6 HOURS PRN
Status: DISCONTINUED | OUTPATIENT
Start: 2017-08-12 | End: 2017-08-19 | Stop reason: HOSPADM

## 2017-08-12 RX ORDER — PROMETHAZINE HYDROCHLORIDE 12.5 MG/1
12.5 SUPPOSITORY RECTAL EVERY 6 HOURS PRN
Status: DISCONTINUED | OUTPATIENT
Start: 2017-08-12 | End: 2017-08-19 | Stop reason: HOSPADM

## 2017-08-12 RX ORDER — ALBUTEROL SULFATE 2.5 MG/3ML
2.5 SOLUTION RESPIRATORY (INHALATION)
Status: DISCONTINUED | OUTPATIENT
Start: 2017-08-12 | End: 2017-08-19 | Stop reason: HOSPADM

## 2017-08-12 RX ORDER — SODIUM CHLORIDE 0.9 % (FLUSH) 0.9 %
10 SYRINGE (ML) INJECTION AS NEEDED
Status: DISCONTINUED | OUTPATIENT
Start: 2017-08-12 | End: 2017-08-19 | Stop reason: HOSPADM

## 2017-08-12 RX ORDER — SODIUM CHLORIDE 9 MG/ML
125 INJECTION, SOLUTION INTRAVENOUS CONTINUOUS
Status: DISCONTINUED | OUTPATIENT
Start: 2017-08-12 | End: 2017-08-12 | Stop reason: SDUPTHER

## 2017-08-12 RX ORDER — TIMOLOL MALEATE 5 MG/ML
1 SOLUTION/ DROPS OPHTHALMIC EVERY 12 HOURS SCHEDULED
Status: DISCONTINUED | OUTPATIENT
Start: 2017-08-12 | End: 2017-08-19 | Stop reason: HOSPADM

## 2017-08-12 RX ADMIN — ONDANSETRON 4 MG: 2 INJECTION INTRAMUSCULAR; INTRAVENOUS at 18:57

## 2017-08-12 RX ADMIN — MORPHINE SULFATE 4 MG: 4 INJECTION, SOLUTION INTRAMUSCULAR; INTRAVENOUS at 18:58

## 2017-08-12 RX ADMIN — SODIUM CHLORIDE 1000 ML: 9 INJECTION, SOLUTION INTRAVENOUS at 22:18

## 2017-08-12 RX ADMIN — SODIUM CHLORIDE 500 ML: 9 INJECTION, SOLUTION INTRAVENOUS at 19:01

## 2017-08-12 RX ADMIN — ALBUTEROL SULFATE 2.5 MG: 2.5 SOLUTION RESPIRATORY (INHALATION) at 22:59

## 2017-08-12 RX ADMIN — SODIUM CHLORIDE 125 ML/HR: 9 INJECTION, SOLUTION INTRAVENOUS at 22:17

## 2017-08-12 RX ADMIN — TAZOBACTAM SODIUM AND PIPERACILLIN SODIUM 4.5 G: 500; 4 INJECTION, SOLUTION INTRAVENOUS at 20:42

## 2017-08-12 RX ADMIN — ALVIMOPAN 12 MG: 12 CAPSULE ORAL at 23:14

## 2017-08-12 RX ADMIN — MORPHINE SULFATE 1 MG: 2 INJECTION, SOLUTION INTRAMUSCULAR; INTRAVENOUS at 22:57

## 2017-08-12 NOTE — ED PROVIDER NOTES
" EMERGENCY DEPARTMENT ENCOUNTER    CHIEF COMPLAINT  Chief Complaint: abd pain  History given by: pt  History limited by: none  Room Number: 28/28  PMD: Jaclyn Escalante MD      HPI:  Pt is a 81 y.o. female with a h/o diverticulitis who presents complaining of worsening, intermittent L-sided abd pain that has been ongoing for 5-6 months. She had a colonoscopy on 07/18/17 which showed nothing acute. She denies having fever, nausea, vomiting, diarrhea or other sx at this time. She also denies h/o abdominal surgery.    Duration:  5 to 6 months  Onset: gradual  Timing: intermittent  Location: L-sided  Radiation: none stated  Quality: \"pain\"  Intensity/Severity: moderate  Progression: worsening  Associated Symptoms: none stated  Aggravating Factors: none stated  Alleviating Factors: none stated   Previous Episodes: Pt has a h/o diverticulitis  Treatment before arrival: none stated    PAST MEDICAL HISTORY  Active Ambulatory Problems     Diagnosis Date Noted   • No Active Ambulatory Problems     Resolved Ambulatory Problems     Diagnosis Date Noted   • No Resolved Ambulatory Problems     Past Medical History:   Diagnosis Date   • Anemia    • Asthma    • GERD (gastroesophageal reflux disease)    • Glaucoma    • Hyperlipidemia    • Hypertension    • Sleep apnea    • Thyroid disease        PAST SURGICAL HISTORY  Past Surgical History:   Procedure Laterality Date   • BREAST LUMPECTOMY     • COLONOSCOPY  approx 2013    pre cancerous polyps  [facility]   • COLONOSCOPY N/A 7/18/2017    Procedure: COLONOSCOPY  TO CECUM/TI  WITH BIOPSY;  Surgeon: Marilynn Tenorio MD;  Location: [facility] ENDOSCOPY;  Service:    • WISDOM TOOTH EXTRACTION         FAMILY HISTORY  Family History   Problem Relation Age of Onset   • Tuberculosis Mother    • Kidney cancer Father        SOCIAL HISTORY  Social History     Social History   • Marital status: Single     Spouse name: N/A   • Number of children: N/A   • Years of education: N/A     Occupational History   • " Not on file.     Social History Main Topics   • Smoking status: Never Smoker   • Smokeless tobacco: Never Used   • Alcohol use 0.6 oz/week     1 Glasses of wine per week      Comment: rare   • Drug use: No   • Sexual activity: Defer     Other Topics Concern   • Not on file     Social History Narrative       ALLERGIES  Contrast dye    REVIEW OF SYSTEMS  Review of Systems   Constitutional: Negative for chills and fever.   HENT: Negative for sore throat.    Eyes: Negative.    Respiratory: Negative for cough and shortness of breath.    Cardiovascular: Negative for chest pain.   Gastrointestinal: Positive for abdominal pain (L-sided). Negative for diarrhea, nausea and vomiting.   Genitourinary: Negative for dysuria.   Musculoskeletal: Negative for back pain and neck pain.   Skin: Negative for rash.   Allergic/Immunologic: Negative.    Neurological: Negative for dizziness, weakness, numbness and headaches.   Hematological: Negative.    Psychiatric/Behavioral: Negative.  The patient is not nervous/anxious.    All other systems reviewed and are negative.      PHYSICAL EXAM  ED Triage Vitals   Temp Heart Rate Resp BP SpO2   08/12/17 1613 08/12/17 1613 08/12/17 1613 -- 08/12/17 1613   97 °F (36.1 °C) 73 18  98 %      Temp src Heart Rate Source Patient Position BP Location FiO2 (%)   -- 08/12/17 1613 -- -- --    Monitor          Physical Exam   Constitutional: She is oriented to person, place, and time and well-developed, well-nourished, and in no distress. No distress.   HENT:   Head: Normocephalic and atraumatic.   Mouth/Throat: Mucous membranes are dry.   Eyes: EOM are normal. Pupils are equal, round, and reactive to light.   Pale conjunctiva   Neck: Normal range of motion. Neck supple.   Cardiovascular: Normal rate, regular rhythm and normal heart sounds.    Pulmonary/Chest: Effort normal and breath sounds normal. No respiratory distress.   Abdominal: Soft. She exhibits no mass. Bowel sounds are hypoactive. There is  tenderness (L groin). There is no rebound and no guarding.   Musculoskeletal: Normal range of motion. She exhibits no edema.   Neurological: She is alert and oriented to person, place, and time. She has normal sensation and normal strength. No cranial nerve deficit.   Skin: Skin is warm and dry. No rash noted.   Psychiatric: Mood and affect normal.   Nursing note and vitals reviewed.      LAB RESULTS  Lab Results (last 24 hours)     Procedure Component Value Units Date/Time    CBC & Differential [[id number]] Collected:  08/12/17 1720    Specimen:  Blood Updated:  08/12/17 1735    Narrative:       The following orders were created for panel order CBC & Differential.  Procedure                               Abnormality         Status                     ---------                               -----------         ------                     CBC Auto Differential[[id number]]        Abnormal            Final result                 Please view results for these tests on the individual orders.    Comprehensive Metabolic Panel [[id number]]  (Abnormal) Collected:  08/12/17 1720    Specimen:  Blood Updated:  08/12/17 1758     Glucose 110 (H) mg/dL      BUN 25 (H) mg/dL      Creatinine 0.80 mg/dL      Sodium 138 mmol/L      Potassium 4.7 mmol/L      Chloride 103 mmol/L      CO2 26.1 mmol/L      Calcium 10.4 mg/dL      Total Protein 7.1 g/dL      Albumin 3.90 g/dL      ALT (SGPT) 25 U/L      AST (SGOT) 19 U/L      Alkaline Phosphatase 74 U/L      Total Bilirubin 0.3 mg/dL      eGFR Non African Amer 69 mL/min/1.73      Globulin 3.2 gm/dL      A/G Ratio 1.2 g/dL      BUN/Creatinine Ratio 31.3 (H)     Anion Gap 8.9 mmol/L     Narrative:       The MDRD GFR formula is only valid for adults with stable renal function between ages 18 and 70.    Lipase [[id number]]  (Normal) Collected:  08/12/17 1720    Specimen:  Blood Updated:  08/12/17 1758     Lipase 25 U/L     CBC Auto Differential [[id number]]  (Abnormal) Collected:  08/12/17 1720     Specimen:  Blood Updated:  08/12/17 1735     WBC 12.04 (H) 10*3/mm3      RBC 4.02 10*6/mm3      Hemoglobin 11.8 (L) g/dL      Hematocrit 37.3 %      MCV 92.8 fL      MCH 29.4 pg      MCHC 31.6 (L) g/dL      RDW 15.8 (H) %      RDW-SD 53.4 fl      MPV 9.2 fL      Platelets 245 10*3/mm3      Neutrophil % 87.9 (H) %      Lymphocyte % 6.8 (L) %      Monocyte % 5.1 %      Eosinophil % 0.0 (L) %      Basophil % 0.0 %      Immature Grans % 0.2 %      Neutrophils, Absolute 10.57 (H) 10*3/mm3      Lymphocytes, Absolute 0.82 (L) 10*3/mm3      Monocytes, Absolute 0.62 10*3/mm3      Eosinophils, Absolute 0.00 10*3/mm3      Basophils, Absolute 0.00 10*3/mm3      Immature Grans, Absolute 0.03 10*3/mm3     Urinalysis With / Culture If Indicated [[id number]]  (Normal) Collected:  08/12/17 2000    Specimen:  Urine from Urine, Clean Catch Updated:  08/12/17 2032     Color, UA Yellow     Appearance, UA Clear     pH, UA 6.0     Specific Gravity, UA 1.015     Glucose, UA Negative     Ketones, UA Negative     Bilirubin, UA Negative     Blood, UA Negative     Protein, UA Negative     Leuk Esterase, UA Negative     Nitrite, UA Negative     Urobilinogen, UA 0.2 E.U./dL    Narrative:       Urine microscopic not indicated.          I ordered the above labs and reviewed the results    RADIOLOGY  CT Abdomen Pelvis Without Contrast   Final Result   1.  The combination of findings is most suspicious for perforated   diverticulitis involving the proximal sigmoid and possibly with   developing pericolonic abscess. No bowel obstruction.   2. Constipation.                   This study was performed with techniques to keep radiation doses as low   as reasonably achievable (ALARA). Individualized dose reduction   techniques using automated exposure control or adjustment of mA and/or   kV according to the patient size were employed.        This report was finalized on 8/12/2017 8:06 PM by Jean-Pierre Mariscal MD.               I ordered the above noted  radiological studies. Interpreted by radiologist.. Reviewed by me in PACS.       PROCEDURES  Procedures      PROGRESS AND CONSULTS  ED Course   1632:  Ordered CBC, UA, lipase and CMP per protocol.     1837: Ordered CT Abd/Pelvis for further evaluation of abdominal pain. Ordered zofran for nausea and morphine for pain.    2010: Ordered zosyn for infection and IVF for hydration.     2012: Informed pt of CT Abd/Pelvis results which revealed evidence of perforated diverticulitis. Discussed likely admission for surgical repair after I consult with general surgery. Pt understands and agrees with the plan. All questions answered.    2015: Discussed patient's case with Dr. Munoz, general surgery, including concerns regarding pt's CT results which revealed perforated diverticulitis with free air in the abd. Also discussed pertinent lab results. He is going to look at the CT results and call me back.     2027: Spoke with Dr. Munoz who will consult with pt to discuss possibility of surgery. He agrees to admit pt to med-surg.    2037: Informed pt that Dr. Munoz will be coming in for consult. Further elaborated on implications of perforated diverticulitis. Discussed plan for admission. Pt understands and agrees with the plan. All questions answered.      MEDICAL DECISION MAKING  Results were reviewed/discussed with the patient and they were also made aware of online access. Pt also made aware that some labs, such as cultures, will not be resulted during ER visit and follow up with PMD is necessary.     MDM  Number of Diagnoses or Management Options     Amount and/or Complexity of Data Reviewed  Clinical lab tests: ordered and reviewed (WBC=12.04)  Tests in the radiology section of CPT®: ordered and reviewed (CT Abdomen Pelvis Without Contrast  Final Result  1.  The combination of findings is most suspicious for perforated  diverticulitis involving the proximal sigmoid and possibly with  developing pericolonic abscess. No bowel  obstruction.  2. Constipation.)  Decide to obtain previous medical records or to obtain history from someone other than the patient: yes  Review and summarize past medical records: yes  Independent visualization of images, tracings, or specimens: yes    Patient Progress  Patient progress: stable         DIAGNOSIS  Final diagnoses:   Diverticulitis of large intestine with perforation and abscess without bleeding       DISPOSITION  ADMISSION    Discussed treatment plan and reason for admission with pt/family and admitting physician.  Pt/family voiced understanding of the plan for admission for further testing/treatment as needed.         Latest Documented Vital Signs:  As of 8:40 PM  BP- 156/83 HR- 71 Temp- 97 °F (36.1 °C) O2 sat- 97%    --  Documentation assistance provided by warren Blanco for Dr. Hunter.  Information recorded by the scribe was done at my direction and has been verified and validated by me.     Francy Blanco  08/12/17 2040       Shoaib Hunter MD  08/12/17 2057

## 2017-08-13 ENCOUNTER — ANESTHESIA EVENT (OUTPATIENT)
Dept: PERIOP | Facility: HOSPITAL | Age: 82
End: 2017-08-13

## 2017-08-13 ENCOUNTER — ANESTHESIA (OUTPATIENT)
Dept: PERIOP | Facility: HOSPITAL | Age: 82
End: 2017-08-13

## 2017-08-13 LAB
ANION GAP SERPL CALCULATED.3IONS-SCNC: 7.9 MMOL/L
BASOPHILS # BLD AUTO: 0 10*3/MM3 (ref 0–0.2)
BASOPHILS NFR BLD AUTO: 0 % (ref 0–1.5)
BUN BLD-MCNC: 16 MG/DL (ref 8–23)
BUN/CREAT SERPL: 22.9 (ref 7–25)
CALCIUM SPEC-SCNC: 9.2 MG/DL (ref 8.6–10.5)
CHLORIDE SERPL-SCNC: 100 MMOL/L (ref 98–107)
CO2 SERPL-SCNC: 26.1 MMOL/L (ref 22–29)
CREAT BLD-MCNC: 0.7 MG/DL (ref 0.57–1)
DEPRECATED RDW RBC AUTO: 53.1 FL (ref 37–54)
EOSINOPHIL # BLD AUTO: 0 10*3/MM3 (ref 0–0.7)
EOSINOPHIL NFR BLD AUTO: 0 % (ref 0.3–6.2)
ERYTHROCYTE [DISTWIDTH] IN BLOOD BY AUTOMATED COUNT: 15.8 % (ref 11.7–13)
GFR SERPL CREATININE-BSD FRML MDRD: 80 ML/MIN/1.73
GLUCOSE BLD-MCNC: 108 MG/DL (ref 65–99)
HCT VFR BLD AUTO: 35.2 % (ref 35.6–45.5)
HGB BLD-MCNC: 11.6 G/DL (ref 11.9–15.5)
IMM GRANULOCYTES # BLD: 0.02 10*3/MM3 (ref 0–0.03)
IMM GRANULOCYTES NFR BLD: 0.2 % (ref 0–0.5)
LYMPHOCYTES # BLD AUTO: 0.94 10*3/MM3 (ref 0.9–4.8)
LYMPHOCYTES NFR BLD AUTO: 9.1 % (ref 19.6–45.3)
MAGNESIUM SERPL-MCNC: 2.4 MG/DL (ref 1.6–2.4)
MCH RBC QN AUTO: 30.3 PG (ref 26.9–32)
MCHC RBC AUTO-ENTMCNC: 33 G/DL (ref 32.4–36.3)
MCV RBC AUTO: 91.9 FL (ref 80.5–98.2)
MONOCYTES # BLD AUTO: 0.38 10*3/MM3 (ref 0.2–1.2)
MONOCYTES NFR BLD AUTO: 3.7 % (ref 5–12)
NEUTROPHILS # BLD AUTO: 9.02 10*3/MM3 (ref 1.9–8.1)
NEUTROPHILS NFR BLD AUTO: 87 % (ref 42.7–76)
PHOSPHATE SERPL-MCNC: 2.5 MG/DL (ref 2.5–4.5)
PLATELET # BLD AUTO: 202 10*3/MM3 (ref 140–500)
PMV BLD AUTO: 9.3 FL (ref 6–12)
POTASSIUM BLD-SCNC: 4.6 MMOL/L (ref 3.5–5.2)
RBC # BLD AUTO: 3.83 10*6/MM3 (ref 3.9–5.2)
SODIUM BLD-SCNC: 134 MMOL/L (ref 136–145)
WBC NRBC COR # BLD: 10.36 10*3/MM3 (ref 4.5–10.7)

## 2017-08-13 PROCEDURE — 25010000002 PROPOFOL 10 MG/ML EMULSION: Performed by: ANESTHESIOLOGY

## 2017-08-13 PROCEDURE — 88302 TISSUE EXAM BY PATHOLOGIST: CPT | Performed by: SURGERY

## 2017-08-13 PROCEDURE — 25010000002 FENTANYL CITRATE (PF) 100 MCG/2ML SOLUTION

## 2017-08-13 PROCEDURE — 44204 LAPARO PARTIAL COLECTOMY: CPT | Performed by: SURGERY

## 2017-08-13 PROCEDURE — 85025 COMPLETE CBC W/AUTO DIFF WBC: CPT | Performed by: SURGERY

## 2017-08-13 PROCEDURE — 25010000002 FENTANYL CITRATE (PF) 100 MCG/2ML SOLUTION: Performed by: ANESTHESIOLOGY

## 2017-08-13 PROCEDURE — 88307 TISSUE EXAM BY PATHOLOGIST: CPT | Performed by: SURGERY

## 2017-08-13 PROCEDURE — 25010000002 HYDROMORPHONE PER 4 MG

## 2017-08-13 PROCEDURE — 94799 UNLISTED PULMONARY SVC/PX: CPT

## 2017-08-13 PROCEDURE — 0DTJ4ZZ RESECTION OF APPENDIX, PERCUTANEOUS ENDOSCOPIC APPROACH: ICD-10-PCS | Performed by: SURGERY

## 2017-08-13 PROCEDURE — 83735 ASSAY OF MAGNESIUM: CPT | Performed by: SURGERY

## 2017-08-13 PROCEDURE — 25010000002 PHENYLEPHRINE PER 1 ML: Performed by: ANESTHESIOLOGY

## 2017-08-13 PROCEDURE — 25010000002 PIPERACILLIN SOD-TAZOBACTAM PER 1 G: Performed by: SURGERY

## 2017-08-13 PROCEDURE — 25010000002 DEXAMETHASONE PER 1 MG: Performed by: ANESTHESIOLOGY

## 2017-08-13 PROCEDURE — 25010000002 NEOSTIGMINE PER 0.5 MG: Performed by: ANESTHESIOLOGY

## 2017-08-13 PROCEDURE — 80048 BASIC METABOLIC PNL TOTAL CA: CPT | Performed by: SURGERY

## 2017-08-13 PROCEDURE — 25010000002 MORPHINE PER 10 MG: Performed by: SURGERY

## 2017-08-13 PROCEDURE — 0DTN4ZZ RESECTION OF SIGMOID COLON, PERCUTANEOUS ENDOSCOPIC APPROACH: ICD-10-PCS | Performed by: SURGERY

## 2017-08-13 PROCEDURE — 25010000002 HYDROMORPHONE PER 4 MG: Performed by: ANESTHESIOLOGY

## 2017-08-13 PROCEDURE — 25010000002 MIDAZOLAM PER 1 MG: Performed by: ANESTHESIOLOGY

## 2017-08-13 PROCEDURE — 25010000002 HYDROMORPHONE PER 4 MG: Performed by: SURGERY

## 2017-08-13 PROCEDURE — 84100 ASSAY OF PHOSPHORUS: CPT | Performed by: SURGERY

## 2017-08-13 PROCEDURE — 44213 LAP MOBIL SPLENIC FL ADD-ON: CPT | Performed by: SURGERY

## 2017-08-13 RX ORDER — ROCURONIUM BROMIDE 10 MG/ML
INJECTION, SOLUTION INTRAVENOUS AS NEEDED
Status: DISCONTINUED | OUTPATIENT
Start: 2017-08-13 | End: 2017-08-13 | Stop reason: SURG

## 2017-08-13 RX ORDER — SODIUM CHLORIDE, SODIUM LACTATE, POTASSIUM CHLORIDE, CALCIUM CHLORIDE 600; 310; 30; 20 MG/100ML; MG/100ML; MG/100ML; MG/100ML
9 INJECTION, SOLUTION INTRAVENOUS CONTINUOUS
Status: DISCONTINUED | OUTPATIENT
Start: 2017-08-13 | End: 2017-08-14

## 2017-08-13 RX ORDER — DEXAMETHASONE SODIUM PHOSPHATE 10 MG/ML
INJECTION INTRAMUSCULAR; INTRAVENOUS AS NEEDED
Status: DISCONTINUED | OUTPATIENT
Start: 2017-08-13 | End: 2017-08-13 | Stop reason: SURG

## 2017-08-13 RX ORDER — PROMETHAZINE HYDROCHLORIDE 25 MG/ML
12.5 INJECTION, SOLUTION INTRAMUSCULAR; INTRAVENOUS ONCE AS NEEDED
Status: DISCONTINUED | OUTPATIENT
Start: 2017-08-13 | End: 2017-08-13 | Stop reason: HOSPADM

## 2017-08-13 RX ORDER — HYDROMORPHONE HCL 110MG/55ML
PATIENT CONTROLLED ANALGESIA SYRINGE INTRAVENOUS
Status: DISCONTINUED
Start: 2017-08-13 | End: 2017-08-13 | Stop reason: WASHOUT

## 2017-08-13 RX ORDER — OXYCODONE AND ACETAMINOPHEN 7.5; 325 MG/1; MG/1
1 TABLET ORAL ONCE AS NEEDED
Status: DISCONTINUED | OUTPATIENT
Start: 2017-08-13 | End: 2017-08-13 | Stop reason: HOSPADM

## 2017-08-13 RX ORDER — HYDRALAZINE HYDROCHLORIDE 20 MG/ML
5 INJECTION INTRAMUSCULAR; INTRAVENOUS
Status: DISCONTINUED | OUTPATIENT
Start: 2017-08-13 | End: 2017-08-13 | Stop reason: HOSPADM

## 2017-08-13 RX ORDER — ONDANSETRON 2 MG/ML
4 INJECTION INTRAMUSCULAR; INTRAVENOUS EVERY 8 HOURS PRN
Status: DISPENSED | OUTPATIENT
Start: 2017-08-13 | End: 2017-08-16

## 2017-08-13 RX ORDER — LIDOCAINE HYDROCHLORIDE 20 MG/ML
INJECTION, SOLUTION INFILTRATION; PERINEURAL AS NEEDED
Status: DISCONTINUED | OUTPATIENT
Start: 2017-08-13 | End: 2017-08-13 | Stop reason: SURG

## 2017-08-13 RX ORDER — PROMETHAZINE HYDROCHLORIDE 25 MG/1
25 SUPPOSITORY RECTAL ONCE AS NEEDED
Status: DISCONTINUED | OUTPATIENT
Start: 2017-08-13 | End: 2017-08-13 | Stop reason: HOSPADM

## 2017-08-13 RX ORDER — HYDROMORPHONE HYDROCHLORIDE 1 MG/ML
0.5 INJECTION, SOLUTION INTRAMUSCULAR; INTRAVENOUS; SUBCUTANEOUS
Status: DISCONTINUED | OUTPATIENT
Start: 2017-08-13 | End: 2017-08-17 | Stop reason: SDUPTHER

## 2017-08-13 RX ORDER — HYDROCODONE BITARTRATE AND ACETAMINOPHEN 7.5; 325 MG/1; MG/1
1 TABLET ORAL ONCE AS NEEDED
Status: DISCONTINUED | OUTPATIENT
Start: 2017-08-13 | End: 2017-08-13 | Stop reason: HOSPADM

## 2017-08-13 RX ORDER — ACETAMINOPHEN 650 MG
TABLET, EXTENDED RELEASE ORAL AS NEEDED
Status: DISCONTINUED | OUTPATIENT
Start: 2017-08-13 | End: 2017-08-13 | Stop reason: HOSPADM

## 2017-08-13 RX ORDER — FENTANYL CITRATE 50 UG/ML
INJECTION, SOLUTION INTRAMUSCULAR; INTRAVENOUS
Status: COMPLETED
Start: 2017-08-13 | End: 2017-08-13

## 2017-08-13 RX ORDER — HYDROMORPHONE HYDROCHLORIDE 1 MG/ML
0.5 INJECTION, SOLUTION INTRAMUSCULAR; INTRAVENOUS; SUBCUTANEOUS
Status: DISCONTINUED | OUTPATIENT
Start: 2017-08-13 | End: 2017-08-13 | Stop reason: HOSPADM

## 2017-08-13 RX ORDER — PROPOFOL 10 MG/ML
VIAL (ML) INTRAVENOUS AS NEEDED
Status: DISCONTINUED | OUTPATIENT
Start: 2017-08-13 | End: 2017-08-13 | Stop reason: SURG

## 2017-08-13 RX ORDER — GLYCOPYRROLATE 0.2 MG/ML
INJECTION INTRAMUSCULAR; INTRAVENOUS AS NEEDED
Status: DISCONTINUED | OUTPATIENT
Start: 2017-08-13 | End: 2017-08-13 | Stop reason: SURG

## 2017-08-13 RX ORDER — MAGNESIUM HYDROXIDE 1200 MG/15ML
LIQUID ORAL AS NEEDED
Status: DISCONTINUED | OUTPATIENT
Start: 2017-08-13 | End: 2017-08-13 | Stop reason: HOSPADM

## 2017-08-13 RX ORDER — PROMETHAZINE HYDROCHLORIDE 25 MG/1
25 TABLET ORAL ONCE AS NEEDED
Status: DISCONTINUED | OUTPATIENT
Start: 2017-08-13 | End: 2017-08-13 | Stop reason: HOSPADM

## 2017-08-13 RX ORDER — MIDAZOLAM HYDROCHLORIDE 1 MG/ML
1 INJECTION INTRAMUSCULAR; INTRAVENOUS
Status: DISCONTINUED | OUTPATIENT
Start: 2017-08-13 | End: 2017-08-13 | Stop reason: HOSPADM

## 2017-08-13 RX ORDER — FLUMAZENIL 0.1 MG/ML
0.2 INJECTION INTRAVENOUS AS NEEDED
Status: DISCONTINUED | OUTPATIENT
Start: 2017-08-13 | End: 2017-08-13 | Stop reason: HOSPADM

## 2017-08-13 RX ORDER — ONDANSETRON 2 MG/ML
4 INJECTION INTRAMUSCULAR; INTRAVENOUS ONCE AS NEEDED
Status: DISCONTINUED | OUTPATIENT
Start: 2017-08-13 | End: 2017-08-13 | Stop reason: HOSPADM

## 2017-08-13 RX ORDER — EPHEDRINE SULFATE 50 MG/ML
5 INJECTION, SOLUTION INTRAVENOUS ONCE AS NEEDED
Status: DISCONTINUED | OUTPATIENT
Start: 2017-08-13 | End: 2017-08-13 | Stop reason: HOSPADM

## 2017-08-13 RX ORDER — LABETALOL HYDROCHLORIDE 5 MG/ML
5 INJECTION, SOLUTION INTRAVENOUS
Status: DISCONTINUED | OUTPATIENT
Start: 2017-08-13 | End: 2017-08-13 | Stop reason: HOSPADM

## 2017-08-13 RX ORDER — ATROPINE SULFATE 0.4 MG/ML
AMPUL (ML) INJECTION AS NEEDED
Status: DISCONTINUED | OUTPATIENT
Start: 2017-08-13 | End: 2017-08-13 | Stop reason: SURG

## 2017-08-13 RX ORDER — MIDAZOLAM HYDROCHLORIDE 1 MG/ML
2 INJECTION INTRAMUSCULAR; INTRAVENOUS
Status: DISCONTINUED | OUTPATIENT
Start: 2017-08-13 | End: 2017-08-13 | Stop reason: HOSPADM

## 2017-08-13 RX ORDER — PROMETHAZINE HYDROCHLORIDE 25 MG/1
12.5 TABLET ORAL ONCE AS NEEDED
Status: DISCONTINUED | OUTPATIENT
Start: 2017-08-13 | End: 2017-08-13 | Stop reason: HOSPADM

## 2017-08-13 RX ORDER — FENTANYL CITRATE 50 UG/ML
50 INJECTION, SOLUTION INTRAMUSCULAR; INTRAVENOUS
Status: DISCONTINUED | OUTPATIENT
Start: 2017-08-13 | End: 2017-08-13 | Stop reason: HOSPADM

## 2017-08-13 RX ORDER — HYDROMORPHONE HCL 110MG/55ML
PATIENT CONTROLLED ANALGESIA SYRINGE INTRAVENOUS AS NEEDED
Status: DISCONTINUED | OUTPATIENT
Start: 2017-08-13 | End: 2017-08-13 | Stop reason: SURG

## 2017-08-13 RX ORDER — SODIUM CHLORIDE 0.9 % (FLUSH) 0.9 %
1-10 SYRINGE (ML) INJECTION AS NEEDED
Status: DISCONTINUED | OUTPATIENT
Start: 2017-08-13 | End: 2017-08-13 | Stop reason: HOSPADM

## 2017-08-13 RX ORDER — BUPIVACAINE HYDROCHLORIDE AND EPINEPHRINE 5; 5 MG/ML; UG/ML
INJECTION, SOLUTION PERINEURAL AS NEEDED
Status: DISCONTINUED | OUTPATIENT
Start: 2017-08-13 | End: 2017-08-13 | Stop reason: HOSPADM

## 2017-08-13 RX ORDER — DIPHENHYDRAMINE HYDROCHLORIDE 50 MG/ML
12.5 INJECTION INTRAMUSCULAR; INTRAVENOUS
Status: DISCONTINUED | OUTPATIENT
Start: 2017-08-13 | End: 2017-08-13 | Stop reason: HOSPADM

## 2017-08-13 RX ORDER — SODIUM CHLORIDE 9 MG/ML
INJECTION, SOLUTION INTRAVENOUS AS NEEDED
Status: DISCONTINUED | OUTPATIENT
Start: 2017-08-13 | End: 2017-08-13 | Stop reason: HOSPADM

## 2017-08-13 RX ORDER — HYDROMORPHONE HCL 110MG/55ML
PATIENT CONTROLLED ANALGESIA SYRINGE INTRAVENOUS
Status: COMPLETED
Start: 2017-08-13 | End: 2017-08-13

## 2017-08-13 RX ORDER — NALOXONE HCL 0.4 MG/ML
0.2 VIAL (ML) INJECTION AS NEEDED
Status: DISCONTINUED | OUTPATIENT
Start: 2017-08-13 | End: 2017-08-13 | Stop reason: HOSPADM

## 2017-08-13 RX ORDER — FAMOTIDINE 10 MG/ML
20 INJECTION, SOLUTION INTRAVENOUS ONCE
Status: COMPLETED | OUTPATIENT
Start: 2017-08-13 | End: 2017-08-13

## 2017-08-13 RX ADMIN — FENTANYL CITRATE 50 MCG: 50 INJECTION INTRAMUSCULAR; INTRAVENOUS at 11:08

## 2017-08-13 RX ADMIN — TAZOBACTAM SODIUM AND PIPERACILLIN SODIUM 3.38 G: 375; 3 INJECTION, SOLUTION INTRAVENOUS at 17:44

## 2017-08-13 RX ADMIN — HYDROMORPHONE HYDROCHLORIDE 0.5 MG: 1 INJECTION, SOLUTION INTRAMUSCULAR; INTRAVENOUS; SUBCUTANEOUS at 20:47

## 2017-08-13 RX ADMIN — SODIUM CHLORIDE, POTASSIUM CHLORIDE, SODIUM LACTATE AND CALCIUM CHLORIDE: 600; 310; 30; 20 INJECTION, SOLUTION INTRAVENOUS at 10:40

## 2017-08-13 RX ADMIN — NEOMYCIN SULFATE, POLYMYXIN B SULFATE AND DEXAMETHASONE 1 DROP: 3.5; 10000; 1 SUSPENSION OPHTHALMIC at 19:08

## 2017-08-13 RX ADMIN — HYDROMORPHONE HYDROCHLORIDE 0.2 MG: 2 INJECTION, SOLUTION INTRAMUSCULAR; INTRAVENOUS; SUBCUTANEOUS at 13:41

## 2017-08-13 RX ADMIN — SODIUM CHLORIDE, POTASSIUM CHLORIDE, SODIUM LACTATE AND CALCIUM CHLORIDE 9 ML/HR: 600; 310; 30; 20 INJECTION, SOLUTION INTRAVENOUS at 09:44

## 2017-08-13 RX ADMIN — FENTANYL CITRATE: 50 INJECTION, SOLUTION INTRAMUSCULAR; INTRAVENOUS at 17:38

## 2017-08-13 RX ADMIN — SODIUM CHLORIDE 125 ML/HR: 9 INJECTION, SOLUTION INTRAVENOUS at 08:07

## 2017-08-13 RX ADMIN — ROCURONIUM BROMIDE 15 MG: 10 INJECTION INTRAVENOUS at 12:55

## 2017-08-13 RX ADMIN — PHENYLEPHRINE HYDROCHLORIDE 100 MCG: 10 INJECTION INTRAVENOUS at 12:18

## 2017-08-13 RX ADMIN — GLYCOPYRROLATE 0.6 MG: 0.2 INJECTION INTRAMUSCULAR; INTRAVENOUS at 15:21

## 2017-08-13 RX ADMIN — DEXAMETHASONE SODIUM PHOSPHATE 8 MG: 10 INJECTION INTRAMUSCULAR; INTRAVENOUS at 15:16

## 2017-08-13 RX ADMIN — PROPOFOL 100 MG: 10 INJECTION, EMULSION INTRAVENOUS at 10:49

## 2017-08-13 RX ADMIN — PHENYLEPHRINE HYDROCHLORIDE 100 MCG: 10 INJECTION INTRAVENOUS at 14:11

## 2017-08-13 RX ADMIN — FENTANYL CITRATE 50 MCG: 50 INJECTION INTRAMUSCULAR; INTRAVENOUS at 10:49

## 2017-08-13 RX ADMIN — MIDAZOLAM 0.5 MG: 1 INJECTION INTRAMUSCULAR; INTRAVENOUS at 09:44

## 2017-08-13 RX ADMIN — FENTANYL CITRATE 50 MCG: 50 INJECTION INTRAMUSCULAR; INTRAVENOUS at 11:19

## 2017-08-13 RX ADMIN — TAZOBACTAM SODIUM AND PIPERACILLIN SODIUM 3.38 G: 375; 3 INJECTION, SOLUTION INTRAVENOUS at 08:07

## 2017-08-13 RX ADMIN — PHENYLEPHRINE HYDROCHLORIDE 100 MCG: 10 INJECTION INTRAVENOUS at 12:31

## 2017-08-13 RX ADMIN — PHENYLEPHRINE HYDROCHLORIDE 100 MCG: 10 INJECTION INTRAVENOUS at 12:37

## 2017-08-13 RX ADMIN — NEOSTIGMINE METHYLSULFATE 3 MG: 1 INJECTION INTRAMUSCULAR; INTRAVENOUS; SUBCUTANEOUS at 15:21

## 2017-08-13 RX ADMIN — FAMOTIDINE 20 MG: 10 INJECTION, SOLUTION INTRAVENOUS at 09:44

## 2017-08-13 RX ADMIN — ROCURONIUM BROMIDE 35 MG: 10 INJECTION INTRAVENOUS at 10:49

## 2017-08-13 RX ADMIN — HYDROMORPHONE HYDROCHLORIDE 0.2 MG: 2 INJECTION, SOLUTION INTRAMUSCULAR; INTRAVENOUS; SUBCUTANEOUS at 14:40

## 2017-08-13 RX ADMIN — ATROPINE SULFATE 0.2 MG: 0.4 INJECTION, SOLUTION INTRAMUSCULAR; INTRAVENOUS; SUBCUTANEOUS at 12:15

## 2017-08-13 RX ADMIN — ALBUTEROL SULFATE 2.5 MG: 2.5 SOLUTION RESPIRATORY (INHALATION) at 19:37

## 2017-08-13 RX ADMIN — TIMOLOL MALEATE 1 DROP: 5 SOLUTION OPHTHALMIC at 17:44

## 2017-08-13 RX ADMIN — HYDROMORPHONE HYDROCHLORIDE: 2 INJECTION, SOLUTION INTRAMUSCULAR; INTRAVENOUS; SUBCUTANEOUS at 17:39

## 2017-08-13 RX ADMIN — ROCURONIUM BROMIDE 15 MG: 10 INJECTION INTRAVENOUS at 14:57

## 2017-08-13 RX ADMIN — FENTANYL CITRATE: 50 INJECTION, SOLUTION INTRAMUSCULAR; INTRAVENOUS at 17:39

## 2017-08-13 RX ADMIN — HYDROMORPHONE HYDROCHLORIDE 0.2 MG: 2 INJECTION, SOLUTION INTRAMUSCULAR; INTRAVENOUS; SUBCUTANEOUS at 13:19

## 2017-08-13 RX ADMIN — FENTANYL CITRATE 50 MCG: 50 INJECTION INTRAMUSCULAR; INTRAVENOUS at 12:55

## 2017-08-13 RX ADMIN — SODIUM CHLORIDE 125 ML/HR: 9 INJECTION, SOLUTION INTRAVENOUS at 21:27

## 2017-08-13 RX ADMIN — HYDROMORPHONE HYDROCHLORIDE 0.2 MG: 2 INJECTION, SOLUTION INTRAMUSCULAR; INTRAVENOUS; SUBCUTANEOUS at 13:55

## 2017-08-13 RX ADMIN — MIDAZOLAM 1 MG: 1 INJECTION INTRAMUSCULAR; INTRAVENOUS at 10:26

## 2017-08-13 RX ADMIN — MORPHINE SULFATE 1 MG: 2 INJECTION, SOLUTION INTRAMUSCULAR; INTRAVENOUS at 02:57

## 2017-08-13 RX ADMIN — ROCURONIUM BROMIDE 15 MG: 10 INJECTION INTRAVENOUS at 14:15

## 2017-08-13 RX ADMIN — SODIUM CHLORIDE, POTASSIUM CHLORIDE, SODIUM LACTATE AND CALCIUM CHLORIDE: 600; 310; 30; 20 INJECTION, SOLUTION INTRAVENOUS at 12:55

## 2017-08-13 RX ADMIN — SODIUM CHLORIDE 125 ML/HR: 9 INJECTION, SOLUTION INTRAVENOUS at 16:00

## 2017-08-13 RX ADMIN — FENTANYL CITRATE 50 MCG: 50 INJECTION INTRAMUSCULAR; INTRAVENOUS at 12:58

## 2017-08-13 RX ADMIN — LIDOCAINE HYDROCHLORIDE 60 MG: 20 INJECTION, SOLUTION INFILTRATION; PERINEURAL at 10:49

## 2017-08-13 NOTE — PLAN OF CARE
Problem: Patient Care Overview (Adult)  Goal: Plan of Care Review  Outcome: Ongoing (interventions implemented as appropriate)    08/13/17 0549   Coping/Psychosocial Response Interventions   Plan Of Care Reviewed With patient   Patient Care Overview   Progress no change   Outcome Evaluation   Outcome Summary/Follow up Plan No increased temp overnight. Some complaints of pain, medicated with morphine x 2. NPO, Surgery today. VSS. Continue to monitor       Goal: Discharge Needs Assessment  Outcome: Ongoing (interventions implemented as appropriate)    Problem: Bowel Resection (Adult)  Goal: Signs and Symptoms of Listed Potential Problems Will be Absent or Manageable (Bowel Resection)  Outcome: Ongoing (interventions implemented as appropriate)

## 2017-08-13 NOTE — ANESTHESIA PREPROCEDURE EVALUATION
Anesthesia Evaluation     Patient summary reviewed and Nursing notes reviewed   NPO Solid Status: > 8 hours  NPO Liquid Status: > 8 hours     Airway   Mallampati: III  TM distance: <3 FB  Neck ROM: full  possible difficult intubation  Dental - normal exam     Pulmonary - normal exam   (+) asthma, sleep apnea on CPAP,   Cardiovascular - normal exam    (+) hypertension, hyperlipidemia      Neuro/Psych- negative ROS  GI/Hepatic/Renal/Endo    (+)  GERD,     Musculoskeletal (-) negative ROS    Abdominal  - normal exam   Substance History - negative use     OB/GYN          Other - negative ROS                                     Anesthesia Plan    ASA 3     general     intravenous induction   Anesthetic plan and risks discussed with patient.

## 2017-08-13 NOTE — PLAN OF CARE
Problem: Patient Care Overview (Adult)  Goal: Plan of Care Review  Outcome: Ongoing (interventions implemented as appropriate)    08/13/17 5278   Coping/Psychosocial Response Interventions   Plan Of Care Reviewed With patient;other (see comments)  (sisters)   Patient Care Overview   Progress improving   Outcome Evaluation   Outcome Summary/Follow up Plan normothermic,denies discomfort on admission to PACU, continues to deny discomfort upon discharge from PACU.

## 2017-08-13 NOTE — H&P
Admit H&P    Referring physician: Cullen Munoz*    Admiting Physician: Cullen Munoz MD    Reason for Admission: Perforated diverticulitis    Chief Complaint   Patient presents with   • Abdominal Pain     started last night - colonoscopy 7/18       HPI:   The patient is a very pleasant 81 y.o. years old female that presented to the hospital with Abdominal pain that started last evening.  Patient reports developing severe sharp abdominal pain located in the left lower quadrant that would radiate to the suprapubic area.  The pain was sharp, started suddenly was 9 out of 10 and progressed through yesterday and has been getting worse today.  She was able to sleep yesterday night.  The pain has been constant since the afternoon.  Pain medication in the emergency room made it better.  Nothing made it worse other than time.  She has been having lower abdominal pain now for approximately 6-8 months.  She was seen by Dr. Mcdermott in consultation and she underwent colonoscopy on July 14 that show evidence of diverticulosis without diverticulitis.  She reports having regular bowel movements of well formed stools.  She usually takes MiraLAX for bowel movements every morning we coffee.  She has been able to tolerate diet and her last meal was meat with smash potatoes for lunch today.  She reports no other changes in bowel habits.  She reports no rectal bleeding.  No fevers or chills no nausea or vomiting.  She has history of heartburn for what she takes over-the-counter antiacid medication.  She has been on meloxicam for knee pain.  She had recent underwent injection of her left ankle.  On CT scan of the abdomen she was found to have free intra-abdominal air without any evidence of free fluid.  There is sigmoid diverticulitis with small pericolonic fluid collection concerning for abscess.  Her white count is in the 12,000 and her vital signs are within normal limits      Past Medical History:   Diagnosis Date   •  Anemia    • Asthma    • GERD (gastroesophageal reflux disease)    • Glaucoma    • Hyperlipidemia    • Hypertension    • Sleep apnea    • Thyroid disease        Past Surgical History:   Procedure Laterality Date   • BREAST LUMPECTOMY     • COLONOSCOPY  approx 2013    pre cancerous polyps  [place]   • COLONOSCOPY N/A 7/18/2017    Procedure: COLONOSCOPY  TO CECUM/TI  WITH BIOPSY;  Surgeon: Marilynn Tenorio MD;  Location: [facility] ENDOSCOPY;  Service:    • WISDOM TOOTH EXTRACTION           Current Facility-Administered Medications:   •  acetaminophen (TYLENOL) tablet 650 mg, 650 mg, Oral, Q4H PRN, Cullen Munoz MD  •  albuterol (PROVENTIL HFA;VENTOLIN HFA) inhaler 2 puff, 2 puff, Inhalation, 4x Daily - RT, Cullen Munoz MD  •  albuterol (PROVENTIL) nebulizer solution 0.083% 2.5 mg/3mL, 2.5 mg, Nebulization, Q6H PRN, Cullen Munoz MD  •  alvimopan (ENTEREG) capsule 12 mg, 12 mg, Oral, Once, Cullen Munoz MD  •  HYDROmorphone (DILAUDID) injection 0.5 mg, 0.5 mg, Intravenous, Q2H PRN **AND** naloxone (NARCAN) injection 0.4 mg, 0.4 mg, Intravenous, Q5 Min PRN, Cullen Munoz MD  •  [START ON 8/13/2017] levothyroxine (SYNTHROID, LEVOTHROID) tablet 25 mcg, 25 mcg, Oral, Q AM, Cullen Munoz MD  •  [START ON 8/13/2017] losartan (COZAAR) tablet 100 mg, 100 mg, Oral, Q24H, Cullen Munoz MD  •  morphine injection 1 mg, 1 mg, Intravenous, Q4H PRN **AND** naloxone (NARCAN) injection 0.4 mg, 0.4 mg, Intravenous, Q5 Min PRN, Cullen Munoz MD  •  [START ON 8/13/2017] neomycin-polymyxin-dexamethasone (MAXITROL) 3.5-54494-0.1 ophthalmic suspension 1 drop, 1 drop, Both Eyes, Q6H, Cullen Munoz MD  •  [START ON 8/13/2017] piperacillin-tazobactam (ZOSYN) 3.375 g in iso-osmotic dextrose 50 ml (premix), 3.375 g, Intravenous, Q8H, Cullen Munoz MD  •  promethazine (PHENERGAN) tablet 12.5 mg, 12.5 mg, Oral, Q6H PRN **OR** promethazine  (PHENERGAN) injection 12.5 mg, 12.5 mg, Intramuscular, Q6H PRN **OR** promethazine (PHENERGAN) suppository 12.5 mg, 12.5 mg, Rectal, Q6H PRN, Cullen Munoz MD  •  sodium chloride 0.9 % bolus 1,000 mL, 1,000 mL, Intravenous, Once, Shoaib Hunter MD  •  sodium chloride 0.9 % flush 1-10 mL, 1-10 mL, Intravenous, PRN, Cullen Munoz MD  •  sodium chloride 0.9 % flush 10 mL, 10 mL, Intravenous, PRN, Shoaib Hunter MD  •  sodium chloride 0.9 % infusion, 125 mL/hr, Intravenous, Continuous, Shoaib Hunter MD  •  timolol (TIMOPTIC) 0.5 % ophthalmic solution 1 drop, 1 drop, Both Eyes, Q12H, Cullen Munoz MD    Allergies   Allergen Reactions   • Contrast Dye        Social History     Social History   • Marital status: Single     Spouse name: N/A   • Number of children: N/A   • Years of education: N/A     Occupational History   • Not on file.     Social History Main Topics   • Smoking status: Never Smoker   • Smokeless tobacco: Never Used   • Alcohol use 0.6 oz/week     1 Glasses of wine per week      Comment: rare   • Drug use: No   • Sexual activity: Defer     Other Topics Concern   • Not on file     Social History Narrative       Family History   Problem Relation Age of Onset   • Tuberculosis Mother    • Kidney cancer Father        ROS:   Constitutional: denies any weight changes, fatigue or weakness.  Eyes: : denies blurred or double vision  Cardiovascular: denies chest pain, palpitations, edemas.  Respiratory: denies cough, sputum, SOB.Reports sleep apnea  Gastrointestinal: As per history of present illness  Genitourinary: denies dysuria, frequency.  Endocrine: denies cold intolerance, lethargy and flushing.  Hem: denies excessive bruising and postop bleeding.  Musculoskeletal: denies weakness, reports joint swelling and stiffness  Neuro: denies seizures, CVA, paresthesia, or peripheral neuropathy.   Skin: denies change in nevi, rashes, masses.    Physical Exam:   Vitals:    08/12/17 2038    BP: 156/83   Pulse: 71   Resp:    Temp:    SpO2: 97%     GENERAL:alert, well appearing, and in no distress and oriented to person, place, and time  HEENT: normochephalic, atraumatic, no scleral icterus moist mucous membranes.  NECK: Supple there is no thyromegaly or lymphadenopathy  CHEST: clear to auscultation, no wheezes, rales or rhonchi, symmetric air entry  CARDIAC: regular rate and rhythm    ABDOMEN: soft,  nondistended, no masses or organomegaly  tenderness noted over left lower quadrant.  There is no rebound or guarding, there is no peritoneal signs.  There is no abdominal pain anywhere else in her abdomen.  Bowel sounds are positive  EXTREMITIES: no cyanosis, clubbing or edema   NEURO: alert and oriented, normal speech, cranial nerves 2-12 grossly intact, no focal deficits   SKIN: Moist, warm, no rashes.    Diagnostic workup:   Abdomen Pelvis(8/12/17): FINDINGS PELVIS CT:  There is a short segment of proximal sigmoid colon  which shows some eccentric wall and fold thickening suspicious for a  focal colitis. On images , there is a somewhat circumscribed  collection of air and fluid which could be related to early abscess  formation. It measures approximately 3.5 cm. This area is likely source  of the free intraperitoneal air. There is some adjacent free fluid  surrounding inflammation.  Normal appendix. The GI tract was not  opacified for assessment but is non obstructive in appearance. There is  abundant fecal material in the colon possibly related to constipation.      IMPRESSION:  1.  The combination of findings is most suspicious for perforated  diverticulitis involving the proximal sigmoid and possibly with  developing pericolonic abscess. No bowel obstruction.  2. Constipation.    Results for SR BRITTANY BOLTON (MRN [id number]) as of 8/12/2017 22:01   Ref. Range 8/12/2017 17:20   WBC Latest Ref Range: 4.50 - 10.70 10*3/mm3 12.04 (H)   RBC Latest Ref Range: 3.90 - 5.20 10*6/mm3 4.02    Hemoglobin Latest Ref Range: 11.9 - 15.5 g/dL 11.8 (L)   Hematocrit Latest Ref Range: 35.6 - 45.5 % 37.3     Results for SR BRITTANY BOLTON (MRN [id number]) as of 8/12/2017 22:01   Ref. Range 8/12/2017 17:20   Sodium Latest Ref Range: 136 - 145 mmol/L 138   Potassium Latest Ref Range: 3.5 - 5.2 mmol/L 4.7   CO2 Latest Ref Range: 22.0 - 29.0 mmol/L 26.1   Chloride Latest Ref Range: 98 - 107 mmol/L 103   Anion Gap Latest Units: mmol/L 8.9   Creatinine Latest Ref Range: 0.57 - 1.00 mg/dL 0.80   BUN Latest Ref Range: 8 - 23 mg/dL 25 (H)     Assessment and plan:   The patient is a very pleasant 81 y.o. years old female with what is seems to be sigmoid colon perforation with free intra-abdominal air but no intra-abdominal fluid.  Surprisingly patient is hemodynamically stable and has only localized pain on palpation in the left lower quadrant.  She does not have any peritoneal signs.  Her white count is only mildly elevated.  She does not look septic.  I have discussed with the patient about treatment options.  I have recommended that she undergoes laparoscopic hand-assisted sigmoid colectomy possible open.  I have discussed with her about the high risk of needing to perform a colostomy due to inflammation and abscess formation and the risk of anastomosis leakage due to inflammation.  I have also discussed with her about conservative management although with amount of free air that she had I do not think that this will be a reasonable option.  The patient have voiced concern about having surgery so late at night and she will rather wait until the morning and see how things are going.  I have discussed with her that if there is any change of her vital signs including fever, tachycardia or worsening abdominal pain and then surgical perforation should be done earlier rather than late.  The patient verbalized understanding and agree with the plan    - Admit to the hospital  - Nothing by mouth and IV fluids and IV  antibiotics  - IV Dilaudid and morphine for pain medication  - Start entereg  - Antinausea medication  - Repeat blood tests in the morning  - Will schedule the patient for laparoscopy-assisted sigmoid colectomy possible ostomy and possible open.    Risks and rationale for the procedure were discussed with Her including but not limited to bleeding, infection, conversion to an open procedure, anastomotic leak and potential need for a colostomy. All of their questions were answered and she would like to proceed with the above recommendations.    Cullen Munoz MD  General, Minimally Invasive and Endoscopic Surgery  [facility]    [address] [place] [address], [address]  [place], [place], [address]  P: [phone]  F: [phone]

## 2017-08-13 NOTE — ANESTHESIA POSTPROCEDURE EVALUATION
"Patient: SR Ghada Ryan    Procedure Summary     Date Anesthesia Start Anesthesia Stop Room / Location    08/13/17 1040 1547  [facility] /  [facility]       Procedure Diagnosis Surgeon Provider    Laparoscopic hand assist sigmoid colectomy (N/A Abdomen) Diverticulitis of large intestine with perforation and abscess without bleeding  (Diverticulitis of large intestine with perforation and abscess without bleeding [K57.20]) MD Dina Barrera MD          Anesthesia Type: general  Last vitals  BP   146/79 (08/13/17 1620)    Temp   36.2 °C (97.1 °F) (08/13/17 1543)    Pulse   69 (08/13/17 1620)   Resp   14 (08/13/17 1615)    SpO2   95 % (08/13/17 1620)      Post Anesthesia Care and Evaluation    Patient location during evaluation: PACU  Patient participation: complete - patient participated  Level of consciousness: awake and alert  Pain management: adequate  Airway patency: patent  Anesthetic complications: No anesthetic complications    Cardiovascular status: acceptable  Respiratory status: acceptable  Hydration status: acceptable    Comments: /79  Pulse 69  Temp 36.2 °C (97.1 °F) (Oral)   Resp 14  Ht 63\" (160 cm)  Wt 185 lb (83.9 kg)  SpO2 95%  BMI 32.77 kg/m2        "

## 2017-08-13 NOTE — PLAN OF CARE
Problem: Patient Care Overview (Adult)  Goal: Plan of Care Review  Outcome: Ongoing (interventions implemented as appropriate)    08/13/17 7898   Coping/Psychosocial Response Interventions   Plan Of Care Reviewed With patient   Patient Care Overview   Progress no change   Outcome Evaluation   Outcome Summary/Follow up Plan Downstairs most of the day; lap colon resection. lap x4 and midline w/ dsg c/d/i. Resting comfortably. IVFs and abx infusing.       Goal: Adult Individualization and Mutuality  Outcome: Ongoing (interventions implemented as appropriate)  Goal: Discharge Needs Assessment  Outcome: Ongoing (interventions implemented as appropriate)    Problem: Bowel Resection (Adult)  Goal: Signs and Symptoms of Listed Potential Problems Will be Absent or Manageable (Bowel Resection)  Outcome: Ongoing (interventions implemented as appropriate)    Problem: Perioperative Period (Adult)  Goal: Signs and Symptoms of Listed Potential Problems Will be Absent or Manageable (Perioperative Period)  Outcome: Ongoing (interventions implemented as appropriate)

## 2017-08-13 NOTE — PLAN OF CARE
Problem: Patient Care Overview (Adult)  Goal: Adult Individualization and Mutuality  Outcome: Outcome(s) achieved Date Met:  08/13/17 08/13/17 3131   Individualization   Patient Specific Goals incisional pain rated 5/10 upon discharge from PACU

## 2017-08-13 NOTE — OP NOTE
DATE OF PROCEDURE: 8/13/17     SURGEON: Cullen Munoz MD      ASSISTANT: Adriana JOHNSON     PROCEDURE PERFORMED:   1) Laparoscopic hand-assisted sigmoid colectomy  2) splenic flexure mobilization  3) Incidental appendectomy     ANESTHESIA: General endotracheal anesthesia.      ESTIMATED BLOOD LOSS: 100 mL      FINDINGS: Perforated diverticulitis     COMPLICATIONS: None      CONDITION: Stable to recovery.      INDICATIONS FOR THE PROCEDURE: The patient is a very pleasant 81-year-old female that presented to the hospital with abdominal pain.  She was found to have sigmoid colon the radiculitis with perforation.  I admitted the patient and offered her laparoscopic sigmoidectomy possible open.  Risk and benefits of the procedure were explained to the patient including bleeding infection intra-abdominal visceral perforation.  She verbalized understanding and agreed with the plan     DESCRIPTION OF PROCEDURE: The patient was taken to the operative room and she was placed in the operative room table in the supine position.  Preoperative antibiotics and SCDs were placed.  Patient underwent general endotracheal anesthesia and a timeout was performed.  A Reeder catheter was placed by the nursing staff.  Patient was position in the split leg position.  The abdomen was then prepped and draped in usual sterile fashion.  With Optiview technique the abdomen was entered in the right periumbilical area with a 5 mm trocar.  Pneumoperitoneum was insufflated. Upon exploration of the abdominal cavity there was no evidence of intra-abdominal injury.  The sigmoid colon was thickened but there was no evidence of fecal contamination.  I continued procedure by placing a 12 mm trocar in the right lower quadrant a 5 mm trocar in the right upper quadrant and another 5 mm trocar in the left lower quadrant and a hand port device in the umbilical area.  The hand port device was placed after incising the skin and the abdominal fascia.  I  started the procedure by exploring the sigmoid colon area.  The sigmoid colon was attached to the lateral wall of the pelvis.  Upon mobilization of the sigmoid colon there was evidence of an abscess cavity with pus.  This was irrigated.  I started mobilizing the sigmoid colon from the lateral peritoneal attachments with a combination of blunt dissection and ultrasonic shelbi.  The lateral attachments from the colon to the Toldt's fascia was transected with ultrasonic shelbi.  The left colon was mobilized laterally to medial.  These continue all the way up to the splenic flexure.  I then turned my attention in the transverse colon.  The lesser sac was entered.  All the attachments from the colon to the greater omentum were taken down with ultrasonic shelbi.  Discontinue all the way up to the splenic flexure.  The colonic splenic ligament was transected and the splenic flexure completely mobilized.  Happy with this I turned my attention to the sigmoid colon.  The colon was mobilized laterally.  The the left ureter was recognized and preserved.  I then transected the mesentery of the sigmoid colon down to the pelvis.  I palpated the upper rectum and lower sigmoid and he was found to be soft and healthy.  I transected the peritoneal attachments to the sigmoid colon laterally at the pelvis.  We then externalized the sigmoid colon and proceeded to select a proximal area of colon that was healthy.  This was transected with Endo AMELIA blue load stapler.  The mesenteric of the colon was then transected with ultrasonic shelbi.  Sigmoidal arteries where a tie- ligated.  The rectosigmoid colon was found to be healthy and with the contour blue load stapler the distal sigmoid was transected.  The specimen was removed from the OR table and sent for pathology analysis.  There was evidence of a small microperforation in the medial aspect of the sigmoid colon.  I then continued to the examining the intra-abdominal cavity.  I decided to  perform an appendectomy.  A retro-appendiceal window was created.  With 2-0 silk ties the recent appendix was double tied.  The appendix was transected.  A pursestring was used to invaginate the base of the appendix.  I then skeletonized the proximal stump of the rectum.  The staple line and the proximal end of the descending colon was then transected.  An anvil from a 29 mm circular stapler was introduced after a pursestring device was used in the proximal colon. This was tied in place.  We then reinsufflated the pneumoperitoneum and with sequential anal dilators the distal stump was dilated.  The stapler was introduced through the anus.  The spike was brought at the posterior aspect of the staple line.  The anvil was engaged.  The stapler was closed and fired.  2 complete donuts where found at the staple.  The mesentery of the colon was found to be straight without any kinking.  We then removed the hand-assisted device and performed a Hydro pneumatic testing of the anastomosis.  There was no evidence of leakage.  Happy with this we reinforced the anterior staple line with interrupted 2-0 silk sutures.  The abdomen was irrigated with 2 L of normal saline.  A 19 Frisian Avinash drain was then placed through the right periumbilical trocar site and secured in place with 2-0 silk sutures.  The 12 mm right lower quadrant trocar incision was closed with interrupted 2-0 Vicryl suture The midline incision was closed with running #1 PDS sutures from the inferior and the superior portion.  The wound was irrigated and it was closed with staplers.  The other incisions were closed with staplers.  Instrument and sponge count were correct.  The patient tolerated procedure very well and she was sent to recovery area in stable condition    Cullen Munoz MD  General, Minimally Invasive and Endoscopic Surgery  [facility]    [address], [address]  [place], [place], [address]  P: [phone]  F: [phone]

## 2017-08-13 NOTE — BRIEF OP NOTE
COLON RESECTION LAPAROSCOPIC SIGMOID OR LOW ANTERIOR  Procedure Note    SR Ghada Ryan  8/12/2017 - 8/13/2017    Pre-op Diagnosis:   Diverticulitis of large intestine with perforation and abscess without bleeding [K57.20]    Post-op Diagnosis:     Post-Op Diagnosis Codes:     * Diverticulitis of large intestine with perforation and abscess without bleeding [K57.20]    Procedure/CPT® Codes:      Procedure(s):  Laparoscopic hand assist sigmoid colectomy, mobilization of splenic flexure, appendectomy    Surgeon(s):  Cullen Munoz MD    Anesthesia: General    Staff:   Circulator: Anastasiya Mcdonald RN; Rosey Ott RN; Ursula Manjarrez RN; Anusha Barron RN  Scrub Person: Apurva Duarte  Assistant: Adriana Perez CSA    Estimated Blood Loss: *No blood loss documented*  Urine Voided: 300 mL    Specimens:                  ID Type Source Tests Collected by Time Destination   A : SIGMOID COLON Tissue Large Intestine, Sigmoid Colon TISSUE EXAM Cullen Munoz MD 8/13/2017 1409          Drains:   Urethral Catheter 08/13/17 1055 16 10 10 (Active)       19 fr kaelyn drain rlq    Findings: perforated diverticulitis    Complications: None      Cullen Munoz MD     Date: 8/13/2017  Time: 3:36 PM

## 2017-08-13 NOTE — ANESTHESIA PROCEDURE NOTES
Airway  Urgency: elective    Airway not difficult    General Information and Staff    Patient location during procedure: OR  Anesthesiologist: TERELL LUIS    Indications and Patient Condition  Indications for airway management: airway protection    Preoxygenated: yes  Mask difficulty assessment: 1 - vent by mask    Final Airway Details  Final airway type: endotracheal airway      Successful airway: ETT  Cuffed: yes   Successful intubation technique: direct laryngoscopy  Endotracheal tube insertion site: oral  Blade: Nikky  Blade size: #3  ETT size: 7.0 mm  Placement verified by: chest auscultation and capnometry   Number of attempts at approach: 1

## 2017-08-14 LAB
ANION GAP SERPL CALCULATED.3IONS-SCNC: 9.2 MMOL/L
BASOPHILS # BLD AUTO: 0 10*3/MM3 (ref 0–0.2)
BASOPHILS NFR BLD AUTO: 0 % (ref 0–1.5)
BUN BLD-MCNC: 13 MG/DL (ref 8–23)
BUN/CREAT SERPL: 19.7 (ref 7–25)
CALCIUM SPEC-SCNC: 8.6 MG/DL (ref 8.6–10.5)
CHLORIDE SERPL-SCNC: 103 MMOL/L (ref 98–107)
CO2 SERPL-SCNC: 23.8 MMOL/L (ref 22–29)
CREAT BLD-MCNC: 0.66 MG/DL (ref 0.57–1)
DEPRECATED RDW RBC AUTO: 51.7 FL (ref 37–54)
EOSINOPHIL # BLD AUTO: 0 10*3/MM3 (ref 0–0.7)
EOSINOPHIL NFR BLD AUTO: 0 % (ref 0.3–6.2)
ERYTHROCYTE [DISTWIDTH] IN BLOOD BY AUTOMATED COUNT: 16 % (ref 11.7–13)
GFR SERPL CREATININE-BSD FRML MDRD: 86 ML/MIN/1.73
GLUCOSE BLD-MCNC: 136 MG/DL (ref 65–99)
HCT VFR BLD AUTO: 33.6 % (ref 35.6–45.5)
HGB BLD-MCNC: 11.2 G/DL (ref 11.9–15.5)
IMM GRANULOCYTES # BLD: 0.03 10*3/MM3 (ref 0–0.03)
IMM GRANULOCYTES NFR BLD: 0.3 % (ref 0–0.5)
LYMPHOCYTES # BLD AUTO: 0.63 10*3/MM3 (ref 0.9–4.8)
LYMPHOCYTES NFR BLD AUTO: 6.2 % (ref 19.6–45.3)
MCH RBC QN AUTO: 29.6 PG (ref 26.9–32)
MCHC RBC AUTO-ENTMCNC: 33.3 G/DL (ref 32.4–36.3)
MCV RBC AUTO: 88.7 FL (ref 80.5–98.2)
MONOCYTES # BLD AUTO: 0.47 10*3/MM3 (ref 0.2–1.2)
MONOCYTES NFR BLD AUTO: 4.6 % (ref 5–12)
NEUTROPHILS # BLD AUTO: 9.03 10*3/MM3 (ref 1.9–8.1)
NEUTROPHILS NFR BLD AUTO: 88.9 % (ref 42.7–76)
PLATELET # BLD AUTO: 211 10*3/MM3 (ref 140–500)
PMV BLD AUTO: 9.1 FL (ref 6–12)
POTASSIUM BLD-SCNC: 4.1 MMOL/L (ref 3.5–5.2)
RBC # BLD AUTO: 3.79 10*6/MM3 (ref 3.9–5.2)
SODIUM BLD-SCNC: 136 MMOL/L (ref 136–145)
WBC NRBC COR # BLD: 10.16 10*3/MM3 (ref 4.5–10.7)

## 2017-08-14 PROCEDURE — 94799 UNLISTED PULMONARY SVC/PX: CPT

## 2017-08-14 PROCEDURE — 80048 BASIC METABOLIC PNL TOTAL CA: CPT | Performed by: SURGERY

## 2017-08-14 PROCEDURE — 99024 POSTOP FOLLOW-UP VISIT: CPT | Performed by: SURGERY

## 2017-08-14 PROCEDURE — 25010000002 HYDROMORPHONE PER 4 MG: Performed by: SURGERY

## 2017-08-14 PROCEDURE — 85025 COMPLETE CBC W/AUTO DIFF WBC: CPT | Performed by: SURGERY

## 2017-08-14 PROCEDURE — 25010000002 PIPERACILLIN SOD-TAZOBACTAM PER 1 G: Performed by: SURGERY

## 2017-08-14 PROCEDURE — 25010000002 ENOXAPARIN PER 10 MG: Performed by: SURGERY

## 2017-08-14 RX ORDER — HYDROCODONE BITARTRATE AND ACETAMINOPHEN 5; 325 MG/1; MG/1
1 TABLET ORAL EVERY 6 HOURS PRN
Status: DISCONTINUED | OUTPATIENT
Start: 2017-08-14 | End: 2017-08-19 | Stop reason: HOSPADM

## 2017-08-14 RX ORDER — ALVIMOPAN 12 MG/1
12 CAPSULE ORAL 2 TIMES DAILY
Status: DISCONTINUED | OUTPATIENT
Start: 2017-08-14 | End: 2017-08-19 | Stop reason: HOSPADM

## 2017-08-14 RX ADMIN — NEOMYCIN SULFATE, POLYMYXIN B SULFATE AND DEXAMETHASONE 1 DROP: 3.5; 10000; 1 SUSPENSION OPHTHALMIC at 11:28

## 2017-08-14 RX ADMIN — SODIUM CHLORIDE 125 ML/HR: 9 INJECTION, SOLUTION INTRAVENOUS at 04:45

## 2017-08-14 RX ADMIN — NEOMYCIN SULFATE, POLYMYXIN B SULFATE AND DEXAMETHASONE 1 DROP: 3.5; 10000; 1 SUSPENSION OPHTHALMIC at 17:14

## 2017-08-14 RX ADMIN — ALBUTEROL SULFATE 2.5 MG: 2.5 SOLUTION RESPIRATORY (INHALATION) at 11:35

## 2017-08-14 RX ADMIN — HYDROMORPHONE HYDROCHLORIDE 0.5 MG: 1 INJECTION, SOLUTION INTRAMUSCULAR; INTRAVENOUS; SUBCUTANEOUS at 04:07

## 2017-08-14 RX ADMIN — LOSARTAN POTASSIUM 100 MG: 50 TABLET, FILM COATED ORAL at 08:10

## 2017-08-14 RX ADMIN — TAZOBACTAM SODIUM AND PIPERACILLIN SODIUM 3.38 G: 375; 3 INJECTION, SOLUTION INTRAVENOUS at 16:17

## 2017-08-14 RX ADMIN — PANTOPRAZOLE SODIUM 40 MG: 40 INJECTION, POWDER, FOR SOLUTION INTRAVENOUS at 11:26

## 2017-08-14 RX ADMIN — SODIUM CHLORIDE 100 ML/HR: 9 INJECTION, SOLUTION INTRAVENOUS at 20:48

## 2017-08-14 RX ADMIN — TAZOBACTAM SODIUM AND PIPERACILLIN SODIUM 3.38 G: 375; 3 INJECTION, SOLUTION INTRAVENOUS at 08:10

## 2017-08-14 RX ADMIN — NEOMYCIN SULFATE, POLYMYXIN B SULFATE AND DEXAMETHASONE 1 DROP: 3.5; 10000; 1 SUSPENSION OPHTHALMIC at 07:44

## 2017-08-14 RX ADMIN — ALVIMOPAN 12 MG: 12 CAPSULE ORAL at 17:14

## 2017-08-14 RX ADMIN — SODIUM CHLORIDE 125 ML/HR: 9 INJECTION, SOLUTION INTRAVENOUS at 12:26

## 2017-08-14 RX ADMIN — LEVOTHYROXINE SODIUM 25 MCG: 25 TABLET ORAL at 07:44

## 2017-08-14 RX ADMIN — HYDROMORPHONE HYDROCHLORIDE 0.5 MG: 1 INJECTION, SOLUTION INTRAMUSCULAR; INTRAVENOUS; SUBCUTANEOUS at 08:10

## 2017-08-14 RX ADMIN — HYDROMORPHONE HYDROCHLORIDE 0.5 MG: 1 INJECTION, SOLUTION INTRAMUSCULAR; INTRAVENOUS; SUBCUTANEOUS at 19:16

## 2017-08-14 RX ADMIN — TIMOLOL MALEATE 1 DROP: 5 SOLUTION OPHTHALMIC at 08:14

## 2017-08-14 RX ADMIN — ALBUTEROL SULFATE 2.5 MG: 2.5 SOLUTION RESPIRATORY (INHALATION) at 19:56

## 2017-08-14 RX ADMIN — TIMOLOL MALEATE 1 DROP: 5 SOLUTION OPHTHALMIC at 20:35

## 2017-08-14 RX ADMIN — ENOXAPARIN SODIUM 30 MG: 30 INJECTION SUBCUTANEOUS at 20:35

## 2017-08-14 RX ADMIN — TAZOBACTAM SODIUM AND PIPERACILLIN SODIUM 3.38 G: 375; 3 INJECTION, SOLUTION INTRAVENOUS at 00:31

## 2017-08-14 RX ADMIN — HYDROMORPHONE HYDROCHLORIDE 0.5 MG: 1 INJECTION, SOLUTION INTRAMUSCULAR; INTRAVENOUS; SUBCUTANEOUS at 00:39

## 2017-08-14 NOTE — PROGRESS NOTES
Discharge Planning Assessment  [facility]     Patient Name: SR Ghada Ryan  MRN: [id number]  Today's Date: 8/14/2017    Admit Date: 8/12/2017          Discharge Needs Assessment       08/14/17 1624    Living Environment    Lives With other (see comments)   In a convent    Living Arrangements house    Home Accessibility bed and bath on same level    Stair Railings at Home none    Type of Financial/Environmental Concern none    Transportation Available car;family or friend will provide    Living Environment    Provides Primary Care For no one, unable/limited ability to care for self    Quality Of Family Relationships supportive;helpful;involved    Able to Return to Prior Living Arrangements yes    Discharge Needs Assessment    Concerns To Be Addressed denies needs/concerns at this time    Equipment Currently Used at Home none    Equipment Needed After Discharge none    Discharge Facility/Level Of Care Needs home with home health    Discharge Disposition home or self-care;home healthcare service            Discharge Plan       08/14/17 1626    Case Management/Social Work Plan    Plan Return to the convent.    Patient/Family In Agreement With Plan yes    Additional Comments Met with the patient at bedside; explained role of CCP, verified facesheet, checked IMM and discussed discharge planning needs.  The patient resides in a convent and plans to return there upon d/c.  She uses no DME, has an elevator to get to her room on the 3rd floor, PCP is Jaclyn Escalante, pharmacy is [facility] and the patient denies any trouble remembering to take her medication or with affording her medications.  The patient denies any HH/SNF history, was provided with a HH/SNF list and states that she would discuss HH with her .  CCP will follow up for a MIGUEL Spivey if needed and will assist with patient's d/c [facility].  NEAL Gant        Discharge Placement     No information found                 Demographic Summary       08/14/17 1623    Referral Information    Admission Type inpatient    Arrived From home or self-care    Referral Source physician    Reason For Consult discharge planning    Record Reviewed history and physical;medical record    Primary Care Physician Information    Name Jaclyn Escalante MD            Functional Status       08/14/17 1623    Functional Status Current    Ambulation 2-->assistive person    Transferring 0-->independent    Toileting 0-->independent    Bathing 0-->independent    Dressing 0-->independent    Eating 0-->independent    Communication 0-->understands/communicates without difficulty    Swallowing (if score 2 or more for any item, consult Rehab Services) 0-->swallows foods/liquids without difficulty    Functional Status Prior    Ambulation 0-->independent    Transferring 0-->independent    Toileting 0-->independent    Bathing 0-->independent    Dressing 0-->independent    Eating 0-->independent    Communication 0-->understands/communicates without difficulty    Swallowing 0-->swallows foods/liquids without difficulty    IADL    Medications independent    Meal Preparation assistive person    Housekeeping assistive person    Laundry assistive person    Shopping assistive person    Oral Care independent            Psychosocial     None            Abuse/Neglect     None            Legal     None            Substance Abuse     None            Patient Forms       08/14/17 1622    Patient Forms    Provider Choice List Delivered    Delivered to Patient    Method of delivery In person          Reema Arias

## 2017-08-14 NOTE — PLAN OF CARE
Problem: Patient Care Overview (Adult)  Goal: Plan of Care Review  Outcome: Ongoing (interventions implemented as appropriate)    08/14/17 3539   Coping/Psychosocial Response Interventions   Plan Of Care Reviewed With patient   Patient Care Overview   Progress improving   Outcome Evaluation   Outcome Summary/Follow up Plan Minimal c/o pain. Belching and passing gas but no BM. F/C removed and DTV between 6263-3374. ITALO drain in place. Ambulated in hallway       Goal: Adult Individualization and Mutuality  Outcome: Ongoing (interventions implemented as appropriate)  Goal: Discharge Needs Assessment  Outcome: Ongoing (interventions implemented as appropriate)    Problem: Bowel Resection (Adult)  Goal: Signs and Symptoms of Listed Potential Problems Will be Absent or Manageable (Bowel Resection)  Outcome: Ongoing (interventions implemented as appropriate)    Problem: Perioperative Period (Adult)  Goal: Signs and Symptoms of Listed Potential Problems Will be Absent or Manageable (Perioperative Period)  Outcome: Ongoing (interventions implemented as appropriate)    Problem: Pressure Ulcer Risk (Martin Scale) (Adult,Obstetrics,Pediatric)  Goal: Identify Related Risk Factors and Signs and Symptoms  Outcome: Ongoing (interventions implemented as appropriate)  Goal: Skin Integrity  Outcome: Ongoing (interventions implemented as appropriate)

## 2017-08-14 NOTE — PLAN OF CARE
Problem: Patient Care Overview (Adult)  Goal: Plan of Care Review  Outcome: Ongoing (interventions implemented as appropriate)    Problem: Bowel Resection (Adult)  Goal: Signs and Symptoms of Listed Potential Problems Will be Absent or Manageable (Bowel Resection)  Outcome: Ongoing (interventions implemented as appropriate)    Problem: Perioperative Period (Adult)  Goal: Signs and Symptoms of Listed Potential Problems Will be Absent or Manageable (Perioperative Period)  Outcome: Ongoing (interventions implemented as appropriate)    Problem: Pressure Ulcer Risk (Martin Scale) (Adult,Obstetrics,Pediatric)  Goal: Skin Integrity  Outcome: Ongoing (interventions implemented as appropriate)

## 2017-08-14 NOTE — PROGRESS NOTES
Postoperative day 1 status post laparoscopic hand-assisted sigmoid colectomy, splenic flexure takedown and appendectomy    S: No events overnight.  Patient feels pretty good.  Complaints of incisional pain.  Has been passing gas denies any nausea or vomiting    O:   Vitals:    08/14/17 0807 08/14/17 1135 08/14/17 1147 08/14/17 1533   BP: 128/79  150/82 171/81   BP Location: Right arm  Right arm Right arm   Patient Position: Sitting  Lying Lying   Pulse: 83 71 72 75   Resp: 16 20 16 16   Temp: 97.8 °F (36.6 °C)  97.4 °F (36.3 °C) 98.4 °F (36.9 °C)   TempSrc: Oral  Oral    SpO2: 93% 94% 95% 93%   Weight:       Height:         I/O: 3257/1930  Drain 80 cc  Urine 1850  Alert and oriented ×3, no acute distress  Chest is clear bilaterally  Regular rate and rhythm  Abdomen is soft, tender over incisions, nondistended, bowel sounds are positive.  Incisions are clean dry and intact.  She has a Avinash drain in the right lower quadrant with serosanguineous fluid  No lower extremity edemas    White blood cell 10  Hemoglobin 11.2  BMP within normal limits    Pathology pending    Postoperative day 1 status post laparoscopic hand-assisted sigmoid colectomy, splenic flexure takedown and appendectomy.  She is doing extremely well after surgery.  Her pain is well controlled and she has been passing gas.    - Pain control with Norco, dilaudid PRN  - Ambulate and get out of bed  - Incentive spirometer every 15 minutes  - Continue home meds  - Start clear liquid diet  - Continue IV antibiotics  - Keep drain in place  - Decrease IV fluids to 100 cc/h of normal saline  - Discontinue Reeder catheter  - DVT prophylaxis with SCDs, start Lovenox    Cullen Munoz MD  General, Minimally Invasive and Endoscopic Surgery  [facility]    [address], [address]  [place], [place], [address]  P: [phone]  F: [phone]

## 2017-08-15 LAB
ANION GAP SERPL CALCULATED.3IONS-SCNC: 11.6 MMOL/L
BASOPHILS # BLD AUTO: 0 10*3/MM3 (ref 0–0.2)
BASOPHILS NFR BLD AUTO: 0 % (ref 0–1.5)
BUN BLD-MCNC: 11 MG/DL (ref 8–23)
BUN/CREAT SERPL: 19.3 (ref 7–25)
CALCIUM SPEC-SCNC: 8.9 MG/DL (ref 8.6–10.5)
CHLORIDE SERPL-SCNC: 103 MMOL/L (ref 98–107)
CO2 SERPL-SCNC: 22.4 MMOL/L (ref 22–29)
CREAT BLD-MCNC: 0.57 MG/DL (ref 0.57–1)
CYTO UR: NORMAL
DEPRECATED RDW RBC AUTO: 53.5 FL (ref 37–54)
EOSINOPHIL # BLD AUTO: 0.05 10*3/MM3 (ref 0–0.7)
EOSINOPHIL NFR BLD AUTO: 0.5 % (ref 0.3–6.2)
ERYTHROCYTE [DISTWIDTH] IN BLOOD BY AUTOMATED COUNT: 15.9 % (ref 11.7–13)
GFR SERPL CREATININE-BSD FRML MDRD: 102 ML/MIN/1.73
GLUCOSE BLD-MCNC: 108 MG/DL (ref 65–99)
HCT VFR BLD AUTO: 31.5 % (ref 35.6–45.5)
HGB BLD-MCNC: 10.4 G/DL (ref 11.9–15.5)
IMM GRANULOCYTES # BLD: 0.03 10*3/MM3 (ref 0–0.03)
IMM GRANULOCYTES NFR BLD: 0.3 % (ref 0–0.5)
LAB AP CASE REPORT: NORMAL
LYMPHOCYTES # BLD AUTO: 0.96 10*3/MM3 (ref 0.9–4.8)
LYMPHOCYTES NFR BLD AUTO: 9.8 % (ref 19.6–45.3)
Lab: NORMAL
MCH RBC QN AUTO: 30.2 PG (ref 26.9–32)
MCHC RBC AUTO-ENTMCNC: 33 G/DL (ref 32.4–36.3)
MCV RBC AUTO: 91.6 FL (ref 80.5–98.2)
MONOCYTES # BLD AUTO: 0.42 10*3/MM3 (ref 0.2–1.2)
MONOCYTES NFR BLD AUTO: 4.3 % (ref 5–12)
NEUTROPHILS # BLD AUTO: 8.3 10*3/MM3 (ref 1.9–8.1)
NEUTROPHILS NFR BLD AUTO: 85.1 % (ref 42.7–76)
PATH REPORT.FINAL DX SPEC: NORMAL
PATH REPORT.GROSS SPEC: NORMAL
PLATELET # BLD AUTO: 224 10*3/MM3 (ref 140–500)
PMV BLD AUTO: 9.3 FL (ref 6–12)
POTASSIUM BLD-SCNC: 4.1 MMOL/L (ref 3.5–5.2)
RBC # BLD AUTO: 3.44 10*6/MM3 (ref 3.9–5.2)
SODIUM BLD-SCNC: 137 MMOL/L (ref 136–145)
WBC NRBC COR # BLD: 9.76 10*3/MM3 (ref 4.5–10.7)

## 2017-08-15 PROCEDURE — 80048 BASIC METABOLIC PNL TOTAL CA: CPT | Performed by: SURGERY

## 2017-08-15 PROCEDURE — 85025 COMPLETE CBC W/AUTO DIFF WBC: CPT | Performed by: SURGERY

## 2017-08-15 PROCEDURE — 99024 POSTOP FOLLOW-UP VISIT: CPT | Performed by: SURGERY

## 2017-08-15 PROCEDURE — 94799 UNLISTED PULMONARY SVC/PX: CPT

## 2017-08-15 PROCEDURE — 25010000002 ENOXAPARIN PER 10 MG: Performed by: SURGERY

## 2017-08-15 PROCEDURE — 25010000002 HYDROMORPHONE PER 4 MG: Performed by: SURGERY

## 2017-08-15 PROCEDURE — 25010000002 PIPERACILLIN SOD-TAZOBACTAM PER 1 G: Performed by: SURGERY

## 2017-08-15 RX ORDER — PANTOPRAZOLE SODIUM 40 MG/1
40 TABLET, DELAYED RELEASE ORAL
Status: DISCONTINUED | OUTPATIENT
Start: 2017-08-15 | End: 2017-08-19 | Stop reason: HOSPADM

## 2017-08-15 RX ORDER — DEXTROSE AND SODIUM CHLORIDE 5; .45 G/100ML; G/100ML
50 INJECTION, SOLUTION INTRAVENOUS CONTINUOUS
Status: DISCONTINUED | OUTPATIENT
Start: 2017-08-15 | End: 2017-08-17

## 2017-08-15 RX ADMIN — ALBUTEROL SULFATE 2.5 MG: 2.5 SOLUTION RESPIRATORY (INHALATION) at 06:55

## 2017-08-15 RX ADMIN — ALVIMOPAN 12 MG: 12 CAPSULE ORAL at 17:06

## 2017-08-15 RX ADMIN — TIMOLOL MALEATE 1 DROP: 5 SOLUTION OPHTHALMIC at 20:21

## 2017-08-15 RX ADMIN — NEOMYCIN SULFATE, POLYMYXIN B SULFATE AND DEXAMETHASONE 1 DROP: 3.5; 10000; 1 SUSPENSION OPHTHALMIC at 06:30

## 2017-08-15 RX ADMIN — PANTOPRAZOLE SODIUM 40 MG: 40 TABLET, DELAYED RELEASE ORAL at 08:34

## 2017-08-15 RX ADMIN — LOSARTAN POTASSIUM 100 MG: 50 TABLET, FILM COATED ORAL at 08:34

## 2017-08-15 RX ADMIN — ENOXAPARIN SODIUM 30 MG: 30 INJECTION SUBCUTANEOUS at 08:40

## 2017-08-15 RX ADMIN — HYDROCODONE BITARTRATE AND ACETAMINOPHEN 1 TABLET: 5; 325 TABLET ORAL at 18:18

## 2017-08-15 RX ADMIN — ALVIMOPAN 12 MG: 12 CAPSULE ORAL at 08:34

## 2017-08-15 RX ADMIN — TAZOBACTAM SODIUM AND PIPERACILLIN SODIUM 3.38 G: 375; 3 INJECTION, SOLUTION INTRAVENOUS at 00:35

## 2017-08-15 RX ADMIN — TIMOLOL MALEATE 1 DROP: 5 SOLUTION OPHTHALMIC at 08:37

## 2017-08-15 RX ADMIN — ALBUTEROL SULFATE 2.5 MG: 2.5 SOLUTION RESPIRATORY (INHALATION) at 19:29

## 2017-08-15 RX ADMIN — SODIUM CHLORIDE 100 ML/HR: 9 INJECTION, SOLUTION INTRAVENOUS at 06:46

## 2017-08-15 RX ADMIN — LEVOTHYROXINE SODIUM 25 MCG: 25 TABLET ORAL at 06:28

## 2017-08-15 RX ADMIN — HYDROCODONE BITARTRATE AND ACETAMINOPHEN 1 TABLET: 5; 325 TABLET ORAL at 00:38

## 2017-08-15 RX ADMIN — TAZOBACTAM SODIUM AND PIPERACILLIN SODIUM 3.38 G: 375; 3 INJECTION, SOLUTION INTRAVENOUS at 08:34

## 2017-08-15 RX ADMIN — HYDROMORPHONE HYDROCHLORIDE 0.5 MG: 1 INJECTION, SOLUTION INTRAMUSCULAR; INTRAVENOUS; SUBCUTANEOUS at 10:26

## 2017-08-15 RX ADMIN — ALBUTEROL SULFATE 2.5 MG: 2.5 SOLUTION RESPIRATORY (INHALATION) at 14:43

## 2017-08-15 RX ADMIN — DEXTROSE AND SODIUM CHLORIDE 75 ML/HR: 5; 450 INJECTION, SOLUTION INTRAVENOUS at 08:35

## 2017-08-15 RX ADMIN — HYDROMORPHONE HYDROCHLORIDE 0.5 MG: 1 INJECTION, SOLUTION INTRAMUSCULAR; INTRAVENOUS; SUBCUTANEOUS at 20:22

## 2017-08-15 RX ADMIN — ENOXAPARIN SODIUM 30 MG: 30 INJECTION SUBCUTANEOUS at 20:21

## 2017-08-15 RX ADMIN — ALBUTEROL SULFATE 2.5 MG: 2.5 SOLUTION RESPIRATORY (INHALATION) at 10:19

## 2017-08-15 RX ADMIN — HYDROCODONE BITARTRATE AND ACETAMINOPHEN 1 TABLET: 5; 325 TABLET ORAL at 06:32

## 2017-08-15 RX ADMIN — TAZOBACTAM SODIUM AND PIPERACILLIN SODIUM 3.38 G: 375; 3 INJECTION, SOLUTION INTRAVENOUS at 16:06

## 2017-08-15 NOTE — PLAN OF CARE
Problem: Patient Care Overview (Adult)  Goal: Plan of Care Review  Outcome: Ongoing (interventions implemented as appropriate)    08/15/17 1621   Coping/Psychosocial Response Interventions   Plan Of Care Reviewed With patient   Patient Care Overview   Progress improving   Outcome Evaluation   Outcome Summary/Follow up Plan Minimal c/o pain. Lortab given x1. Zosyn continued. Advanced to full liquids. Awaiting bowel function, passing gas but no BM. ITALO drain with moderate output       Goal: Adult Individualization and Mutuality  Outcome: Ongoing (interventions implemented as appropriate)  Goal: Discharge Needs Assessment  Outcome: Ongoing (interventions implemented as appropriate)    Problem: Bowel Resection (Adult)  Goal: Signs and Symptoms of Listed Potential Problems Will be Absent or Manageable (Bowel Resection)  Outcome: Ongoing (interventions implemented as appropriate)    Problem: Perioperative Period (Adult)  Goal: Signs and Symptoms of Listed Potential Problems Will be Absent or Manageable (Perioperative Period)  Outcome: Ongoing (interventions implemented as appropriate)    Problem: Pressure Ulcer Risk (Martin Scale) (Adult,Obstetrics,Pediatric)  Goal: Identify Related Risk Factors and Signs and Symptoms  Outcome: Ongoing (interventions implemented as appropriate)  Goal: Skin Integrity  Outcome: Ongoing (interventions implemented as appropriate)

## 2017-08-15 NOTE — SIGNIFICANT NOTE
08/15/17 1355   Rehab Treatment   Discipline physical therapist  (pt refused sklled PT need, states is walking 800ft (2 laps) around unit and up ad rj. RN cofirms pt moblity..plans is to return home, denies equipment needs. will sign off. )

## 2017-08-15 NOTE — PROGRESS NOTES
"Postoperative day 2 status post laparoscopic hand-assisted sigmoid colectomy, splenic flexure takedown and appendectomy    S: No events overnight.  She feels great.  Has been passing gas but they have any bowel movement.  Has been tolerating clear liquid diet    Vitals:    08/15/17 1136 08/15/17 1443 08/15/17 1555 08/15/17 1929   BP: 161/85  163/82    BP Location: Left arm  Left arm    Patient Position: Lying  Lying    Pulse: 67 66 72 68   Resp: 16 16 16 16   Temp: 97.6 °F (36.4 °C)  97.7 °F (36.5 °C)    TempSrc: Oral  Oral    SpO2: 96% 96% 96%    Weight:       Height:         I/O: 2886/2240  Drain 140 cc  Urine 2100  Alert and oriented ×3, no acute distress  Chest is clear bilaterally  Regular rate and rhythm  Abdomen is soft, tender over incisions, nondistended, bowel sounds are positive.  Incisions are clean dry and intact.  She has a Avinash drain in the right lower quadrant with serosanguineous fluid  No lower extremity edemas    White blood count 9.7  Hemoglobin 10.4  BMP within normal limits    Pathology:   Final Diagnosis   SEGMENT OF LARGE INTESTINE \"SIGMOID\":                         ACUTE AND CHRONIC DIVERTICULITIS.                         EVIDENCE OF DIVERTICULAR RUPTURE/PERFORATION WITH ASSOCIATED ABSCESS AND ACUTE                                                 SEROSITIS.     APPENDIX: NO SIGNIFICANT HISTOLOGIC ABNORMALITY.     Postoperative day 2 status post laparoscopic hand-assisted sigmoid colectomy, splenic flexure takedown and appendectomy    - Pain control with Norco and when necessary IV meds  - Continue IV fluids  - Advance to full liquid diet  - Continue Entereg  - Physical therapy evaluation  - Continue IV antibiotics  - SCDs and Lovenox    uCllen Munoz MD  General, Minimally Invasive and Endoscopic Surgery  [facility]    [address], [address]  [place], [place], [address]  P: [phone]  F: [phone]   "

## 2017-08-15 NOTE — PROGRESS NOTES
Continued Stay Note  [facility]     Patient Name: SR Ghada Ryan  MRN: [id number]  Today's Date: 8/15/2017    Admit Date: 8/12/2017          Discharge Plan       08/15/17 1430    Case Management/Social Work Plan    Plan Plans to return home (con[facility]).    Patient/Family In Agreement With Plan yes    Additional Comments Per PT notes patient ambulated 800 ft and does not need skilled PT to follow at home. No needs noted. Continue to follow.              Discharge Codes     None            Alyce Duran RN

## 2017-08-15 NOTE — PLAN OF CARE
Problem: Patient Care Overview (Adult)  Goal: Plan of Care Review  Outcome: Ongoing (interventions implemented as appropriate)    08/15/17 0324   Coping/Psychosocial Response Interventions   Plan Of Care Reviewed With patient   Patient Care Overview   Progress improving   Outcome Evaluation   Outcome Summary/Follow up Plan patient with minimal c/o of pain; patient voiding without difficulty; Bowel sounds hypoactive, but passing gas; ITALO drain with moderate serosang output         Problem: Bowel Resection (Adult)  Goal: Signs and Symptoms of Listed Potential Problems Will be Absent or Manageable (Bowel Resection)  Outcome: Ongoing (interventions implemented as appropriate)    Problem: Perioperative Period (Adult)  Goal: Signs and Symptoms of Listed Potential Problems Will be Absent or Manageable (Perioperative Period)  Outcome: Ongoing (interventions implemented as appropriate)    Problem: Pressure Ulcer Risk (Martin Scale) (Adult,Obstetrics,Pediatric)  Goal: Skin Integrity  Outcome: Ongoing (interventions implemented as appropriate)

## 2017-08-16 LAB
ANION GAP SERPL CALCULATED.3IONS-SCNC: 9.4 MMOL/L
BASOPHILS # BLD AUTO: 0.01 10*3/MM3 (ref 0–0.2)
BASOPHILS NFR BLD AUTO: 0.1 % (ref 0–1.5)
BUN BLD-MCNC: 7 MG/DL (ref 8–23)
BUN/CREAT SERPL: 13.2 (ref 7–25)
CALCIUM SPEC-SCNC: 9.1 MG/DL (ref 8.6–10.5)
CHLORIDE SERPL-SCNC: 101 MMOL/L (ref 98–107)
CO2 SERPL-SCNC: 25.6 MMOL/L (ref 22–29)
CREAT BLD-MCNC: 0.53 MG/DL (ref 0.57–1)
DEPRECATED RDW RBC AUTO: 52.4 FL (ref 37–54)
EOSINOPHIL # BLD AUTO: 0.48 10*3/MM3 (ref 0–0.7)
EOSINOPHIL NFR BLD AUTO: 5.7 % (ref 0.3–6.2)
ERYTHROCYTE [DISTWIDTH] IN BLOOD BY AUTOMATED COUNT: 15.6 % (ref 11.7–13)
GFR SERPL CREATININE-BSD FRML MDRD: 111 ML/MIN/1.73
GLUCOSE BLD-MCNC: 119 MG/DL (ref 65–99)
HCT VFR BLD AUTO: 32.3 % (ref 35.6–45.5)
HGB BLD-MCNC: 10.4 G/DL (ref 11.9–15.5)
IMM GRANULOCYTES # BLD: 0.04 10*3/MM3 (ref 0–0.03)
IMM GRANULOCYTES NFR BLD: 0.5 % (ref 0–0.5)
LYMPHOCYTES # BLD AUTO: 1.04 10*3/MM3 (ref 0.9–4.8)
LYMPHOCYTES NFR BLD AUTO: 12.4 % (ref 19.6–45.3)
MCH RBC QN AUTO: 29.6 PG (ref 26.9–32)
MCHC RBC AUTO-ENTMCNC: 32.2 G/DL (ref 32.4–36.3)
MCV RBC AUTO: 92 FL (ref 80.5–98.2)
MONOCYTES # BLD AUTO: 0.54 10*3/MM3 (ref 0.2–1.2)
MONOCYTES NFR BLD AUTO: 6.4 % (ref 5–12)
NEUTROPHILS # BLD AUTO: 6.27 10*3/MM3 (ref 1.9–8.1)
NEUTROPHILS NFR BLD AUTO: 74.9 % (ref 42.7–76)
PLATELET # BLD AUTO: 223 10*3/MM3 (ref 140–500)
PMV BLD AUTO: 9.3 FL (ref 6–12)
POTASSIUM BLD-SCNC: 3.7 MMOL/L (ref 3.5–5.2)
RBC # BLD AUTO: 3.51 10*6/MM3 (ref 3.9–5.2)
SODIUM BLD-SCNC: 136 MMOL/L (ref 136–145)
WBC NRBC COR # BLD: 8.38 10*3/MM3 (ref 4.5–10.7)

## 2017-08-16 PROCEDURE — 94799 UNLISTED PULMONARY SVC/PX: CPT

## 2017-08-16 PROCEDURE — 99024 POSTOP FOLLOW-UP VISIT: CPT | Performed by: SURGERY

## 2017-08-16 PROCEDURE — 25010000002 HYDROMORPHONE PER 4 MG: Performed by: SURGERY

## 2017-08-16 PROCEDURE — 80048 BASIC METABOLIC PNL TOTAL CA: CPT | Performed by: SURGERY

## 2017-08-16 PROCEDURE — 85025 COMPLETE CBC W/AUTO DIFF WBC: CPT | Performed by: SURGERY

## 2017-08-16 PROCEDURE — 25010000002 ENOXAPARIN PER 10 MG: Performed by: SURGERY

## 2017-08-16 PROCEDURE — 25010000002 PIPERACILLIN SOD-TAZOBACTAM PER 1 G: Performed by: SURGERY

## 2017-08-16 PROCEDURE — 25010000002 HYDRALAZINE PER 20 MG: Performed by: SURGERY

## 2017-08-16 RX ORDER — HYDRALAZINE HYDROCHLORIDE 20 MG/ML
20 INJECTION INTRAMUSCULAR; INTRAVENOUS ONCE
Status: COMPLETED | OUTPATIENT
Start: 2017-08-16 | End: 2017-08-16

## 2017-08-16 RX ADMIN — TAZOBACTAM SODIUM AND PIPERACILLIN SODIUM 3.38 G: 375; 3 INJECTION, SOLUTION INTRAVENOUS at 17:53

## 2017-08-16 RX ADMIN — ALBUTEROL SULFATE 2.5 MG: 2.5 SOLUTION RESPIRATORY (INHALATION) at 07:02

## 2017-08-16 RX ADMIN — LOSARTAN POTASSIUM 100 MG: 50 TABLET, FILM COATED ORAL at 05:52

## 2017-08-16 RX ADMIN — ENOXAPARIN SODIUM 30 MG: 30 INJECTION SUBCUTANEOUS at 08:33

## 2017-08-16 RX ADMIN — TAZOBACTAM SODIUM AND PIPERACILLIN SODIUM 3.38 G: 375; 3 INJECTION, SOLUTION INTRAVENOUS at 08:33

## 2017-08-16 RX ADMIN — TIMOLOL MALEATE 1 DROP: 5 SOLUTION OPHTHALMIC at 08:41

## 2017-08-16 RX ADMIN — NEOMYCIN SULFATE, POLYMYXIN B SULFATE AND DEXAMETHASONE 1 DROP: 3.5; 10000; 1 SUSPENSION OPHTHALMIC at 00:22

## 2017-08-16 RX ADMIN — ALBUTEROL SULFATE 2.5 MG: 2.5 SOLUTION RESPIRATORY (INHALATION) at 10:59

## 2017-08-16 RX ADMIN — LEVOTHYROXINE SODIUM 25 MCG: 25 TABLET ORAL at 08:00

## 2017-08-16 RX ADMIN — HYDROMORPHONE HYDROCHLORIDE 0.5 MG: 1 INJECTION, SOLUTION INTRAMUSCULAR; INTRAVENOUS; SUBCUTANEOUS at 21:42

## 2017-08-16 RX ADMIN — ALVIMOPAN 12 MG: 12 CAPSULE ORAL at 08:34

## 2017-08-16 RX ADMIN — ALVIMOPAN 12 MG: 12 CAPSULE ORAL at 17:53

## 2017-08-16 RX ADMIN — TIMOLOL MALEATE 1 DROP: 5 SOLUTION OPHTHALMIC at 21:44

## 2017-08-16 RX ADMIN — ALBUTEROL SULFATE 2.5 MG: 2.5 SOLUTION RESPIRATORY (INHALATION) at 20:10

## 2017-08-16 RX ADMIN — ENOXAPARIN SODIUM 30 MG: 30 INJECTION SUBCUTANEOUS at 22:03

## 2017-08-16 RX ADMIN — DEXTROSE AND SODIUM CHLORIDE 75 ML/HR: 5; 450 INJECTION, SOLUTION INTRAVENOUS at 10:04

## 2017-08-16 RX ADMIN — HYDRALAZINE HYDROCHLORIDE 20 MG: 20 INJECTION INTRAMUSCULAR; INTRAVENOUS at 05:52

## 2017-08-16 RX ADMIN — TAZOBACTAM SODIUM AND PIPERACILLIN SODIUM 3.38 G: 375; 3 INJECTION, SOLUTION INTRAVENOUS at 00:21

## 2017-08-16 RX ADMIN — ALBUTEROL SULFATE 2.5 MG: 2.5 SOLUTION RESPIRATORY (INHALATION) at 15:43

## 2017-08-16 RX ADMIN — PANTOPRAZOLE SODIUM 40 MG: 40 TABLET, DELAYED RELEASE ORAL at 08:00

## 2017-08-16 NOTE — PLAN OF CARE
Problem: Patient Care Overview (Adult)  Goal: Plan of Care Review  Outcome: Ongoing (interventions implemented as appropriate)    08/16/17 182   Coping/Psychosocial Response Interventions   Plan Of Care Reviewed With patient   Patient Care Overview   Progress improving   Outcome Evaluation   Outcome Summary/Follow up Plan out of bed most of day; IS encouraged. Appetite poor. Flatus +; still no bm.       Goal: Adult Individualization and Mutuality  Outcome: Ongoing (interventions implemented as appropriate)  Goal: Discharge Needs Assessment  Outcome: Ongoing (interventions implemented as appropriate)    Problem: Bowel Resection (Adult)  Goal: Signs and Symptoms of Listed Potential Problems Will be Absent or Manageable (Bowel Resection)  Outcome: Ongoing (interventions implemented as appropriate)    Problem: Perioperative Period (Adult)  Goal: Signs and Symptoms of Listed Potential Problems Will be Absent or Manageable (Perioperative Period)  Outcome: Ongoing (interventions implemented as appropriate)    Problem: Pressure Ulcer Risk (Martin Scale) (Adult,Obstetrics,Pediatric)  Goal: Skin Integrity  Outcome: Ongoing (interventions implemented as appropriate)

## 2017-08-16 NOTE — PLAN OF CARE
Problem: Patient Care Overview (Adult)  Goal: Plan of Care Review  Outcome: Ongoing (interventions implemented as appropriate)    08/16/17 0829   Coping/Psychosocial Response Interventions   Plan Of Care Reviewed With patient   Patient Care Overview   Progress improving   Outcome Evaluation   Outcome Summary/Follow up Plan iv pain medication given once for pain, waiting to have a bowel movement, hypertension treated with one time dose of iv hydralazine        Goal: Adult Individualization and Mutuality  Outcome: Ongoing (interventions implemented as appropriate)  Goal: Discharge Needs Assessment  Outcome: Ongoing (interventions implemented as appropriate)    Problem: Bowel Resection (Adult)  Goal: Signs and Symptoms of Listed Potential Problems Will be Absent or Manageable (Bowel Resection)  Outcome: Ongoing (interventions implemented as appropriate)    Problem: Perioperative Period (Adult)  Goal: Signs and Symptoms of Listed Potential Problems Will be Absent or Manageable (Perioperative Period)  Outcome: Ongoing (interventions implemented as appropriate)    Problem: Pressure Ulcer Risk (Martin Scale) (Adult,Obstetrics,Pediatric)  Goal: Identify Related Risk Factors and Signs and Symptoms  Outcome: Outcome(s) achieved Date Met:  08/16/17  Goal: Skin Integrity  Outcome: Ongoing (interventions implemented as appropriate)

## 2017-08-17 LAB
ANION GAP SERPL CALCULATED.3IONS-SCNC: 11.6 MMOL/L
BASOPHILS # BLD AUTO: 0.01 10*3/MM3 (ref 0–0.2)
BASOPHILS NFR BLD AUTO: 0.2 % (ref 0–1.5)
BUN BLD-MCNC: 7 MG/DL (ref 8–23)
BUN/CREAT SERPL: 12.1 (ref 7–25)
CALCIUM SPEC-SCNC: 9.3 MG/DL (ref 8.6–10.5)
CHLORIDE SERPL-SCNC: 100 MMOL/L (ref 98–107)
CO2 SERPL-SCNC: 25.4 MMOL/L (ref 22–29)
CREAT BLD-MCNC: 0.58 MG/DL (ref 0.57–1)
DEPRECATED RDW RBC AUTO: 51.6 FL (ref 37–54)
EOSINOPHIL # BLD AUTO: 0.47 10*3/MM3 (ref 0–0.7)
EOSINOPHIL NFR BLD AUTO: 7.1 % (ref 0.3–6.2)
ERYTHROCYTE [DISTWIDTH] IN BLOOD BY AUTOMATED COUNT: 15.3 % (ref 11.7–13)
GFR SERPL CREATININE-BSD FRML MDRD: 100 ML/MIN/1.73
GLUCOSE BLD-MCNC: 110 MG/DL (ref 65–99)
HCT VFR BLD AUTO: 32.4 % (ref 35.6–45.5)
HGB BLD-MCNC: 10.3 G/DL (ref 11.9–15.5)
IMM GRANULOCYTES # BLD: 0.07 10*3/MM3 (ref 0–0.03)
IMM GRANULOCYTES NFR BLD: 1.1 % (ref 0–0.5)
LYMPHOCYTES # BLD AUTO: 1.15 10*3/MM3 (ref 0.9–4.8)
LYMPHOCYTES NFR BLD AUTO: 17.3 % (ref 19.6–45.3)
MAGNESIUM SERPL-MCNC: 2.1 MG/DL (ref 1.6–2.4)
MCH RBC QN AUTO: 28.9 PG (ref 26.9–32)
MCHC RBC AUTO-ENTMCNC: 31.8 G/DL (ref 32.4–36.3)
MCV RBC AUTO: 91 FL (ref 80.5–98.2)
MONOCYTES # BLD AUTO: 0.57 10*3/MM3 (ref 0.2–1.2)
MONOCYTES NFR BLD AUTO: 8.6 % (ref 5–12)
NEUTROPHILS # BLD AUTO: 4.36 10*3/MM3 (ref 1.9–8.1)
NEUTROPHILS NFR BLD AUTO: 65.7 % (ref 42.7–76)
PHOSPHATE SERPL-MCNC: 2.6 MG/DL (ref 2.5–4.5)
PLATELET # BLD AUTO: 262 10*3/MM3 (ref 140–500)
PMV BLD AUTO: 8.8 FL (ref 6–12)
POTASSIUM BLD-SCNC: 3.8 MMOL/L (ref 3.5–5.2)
RBC # BLD AUTO: 3.56 10*6/MM3 (ref 3.9–5.2)
SODIUM BLD-SCNC: 137 MMOL/L (ref 136–145)
WBC NRBC COR # BLD: 6.63 10*3/MM3 (ref 4.5–10.7)

## 2017-08-17 PROCEDURE — 25010000002 ENOXAPARIN PER 10 MG: Performed by: SURGERY

## 2017-08-17 PROCEDURE — 85025 COMPLETE CBC W/AUTO DIFF WBC: CPT | Performed by: SURGERY

## 2017-08-17 PROCEDURE — 84100 ASSAY OF PHOSPHORUS: CPT | Performed by: SURGERY

## 2017-08-17 PROCEDURE — 94799 UNLISTED PULMONARY SVC/PX: CPT

## 2017-08-17 PROCEDURE — 25010000002 PIPERACILLIN SOD-TAZOBACTAM PER 1 G: Performed by: SURGERY

## 2017-08-17 PROCEDURE — 83735 ASSAY OF MAGNESIUM: CPT | Performed by: SURGERY

## 2017-08-17 PROCEDURE — 80048 BASIC METABOLIC PNL TOTAL CA: CPT | Performed by: SURGERY

## 2017-08-17 PROCEDURE — 99024 POSTOP FOLLOW-UP VISIT: CPT | Performed by: SURGERY

## 2017-08-17 RX ORDER — AMOXICILLIN AND CLAVULANATE POTASSIUM 875; 125 MG/1; MG/1
1 TABLET, FILM COATED ORAL EVERY 12 HOURS SCHEDULED
Status: DISCONTINUED | OUTPATIENT
Start: 2017-08-17 | End: 2017-08-19 | Stop reason: HOSPADM

## 2017-08-17 RX ADMIN — DEXTROSE AND SODIUM CHLORIDE 50 ML/HR: 5; 450 INJECTION, SOLUTION INTRAVENOUS at 00:04

## 2017-08-17 RX ADMIN — TIMOLOL MALEATE 1 DROP: 5 SOLUTION OPHTHALMIC at 09:34

## 2017-08-17 RX ADMIN — LEVOTHYROXINE SODIUM 25 MCG: 25 TABLET ORAL at 05:53

## 2017-08-17 RX ADMIN — AMOXICILLIN AND CLAVULANATE POTASSIUM 1 TABLET: 875; 125 TABLET, FILM COATED ORAL at 14:11

## 2017-08-17 RX ADMIN — ALBUTEROL SULFATE 2.5 MG: 2.5 SOLUTION RESPIRATORY (INHALATION) at 21:24

## 2017-08-17 RX ADMIN — ENOXAPARIN SODIUM 30 MG: 30 INJECTION SUBCUTANEOUS at 09:33

## 2017-08-17 RX ADMIN — ALVIMOPAN 12 MG: 12 CAPSULE ORAL at 17:52

## 2017-08-17 RX ADMIN — LOSARTAN POTASSIUM 100 MG: 50 TABLET, FILM COATED ORAL at 05:40

## 2017-08-17 RX ADMIN — AMOXICILLIN AND CLAVULANATE POTASSIUM 1 TABLET: 875; 125 TABLET, FILM COATED ORAL at 20:30

## 2017-08-17 RX ADMIN — ALVIMOPAN 12 MG: 12 CAPSULE ORAL at 09:33

## 2017-08-17 RX ADMIN — HYDROCODONE BITARTRATE AND ACETAMINOPHEN 1 TABLET: 5; 325 TABLET ORAL at 20:30

## 2017-08-17 RX ADMIN — ALBUTEROL SULFATE 2.5 MG: 2.5 SOLUTION RESPIRATORY (INHALATION) at 11:52

## 2017-08-17 RX ADMIN — TAZOBACTAM SODIUM AND PIPERACILLIN SODIUM 3.38 G: 375; 3 INJECTION, SOLUTION INTRAVENOUS at 08:15

## 2017-08-17 RX ADMIN — ENOXAPARIN SODIUM 30 MG: 30 INJECTION SUBCUTANEOUS at 20:29

## 2017-08-17 RX ADMIN — MAGNESIUM HYDROXIDE 10 ML: 2400 SUSPENSION ORAL at 17:52

## 2017-08-17 RX ADMIN — TAZOBACTAM SODIUM AND PIPERACILLIN SODIUM 3.38 G: 375; 3 INJECTION, SOLUTION INTRAVENOUS at 00:04

## 2017-08-17 RX ADMIN — TIMOLOL MALEATE 1 DROP: 5 SOLUTION OPHTHALMIC at 20:30

## 2017-08-17 RX ADMIN — PANTOPRAZOLE SODIUM 40 MG: 40 TABLET, DELAYED RELEASE ORAL at 05:40

## 2017-08-17 NOTE — PLAN OF CARE
Problem: Patient Care Overview (Adult)  Goal: Plan of Care Review  Outcome: Ongoing (interventions implemented as appropriate)    08/17/17 7560   Outcome Evaluation   Outcome Summary/Follow up Plan oral antibiotics started; ivf dc'd. Prune juice this am and MOM this pm in attempts to promote bm; still to no avail. Flatus++>danette patent/ stripped serous sang. drainage.       Goal: Adult Individualization and Mutuality  Outcome: Ongoing (interventions implemented as appropriate)  Goal: Discharge Needs Assessment  Outcome: Ongoing (interventions implemented as appropriate)    Problem: Bowel Resection (Adult)  Goal: Signs and Symptoms of Listed Potential Problems Will be Absent or Manageable (Bowel Resection)  Outcome: Ongoing (interventions implemented as appropriate)    Problem: Perioperative Period (Adult)  Goal: Signs and Symptoms of Listed Potential Problems Will be Absent or Manageable (Perioperative Period)  Outcome: Ongoing (interventions implemented as appropriate)    Problem: Pressure Ulcer Risk (Martin Scale) (Adult,Obstetrics,Pediatric)  Goal: Skin Integrity  Outcome: Ongoing (interventions implemented as appropriate)

## 2017-08-17 NOTE — PLAN OF CARE
Problem: Patient Care Overview (Adult)  Goal: Plan of Care Review  Outcome: Ongoing (interventions implemented as appropriate)    08/17/17 0219   Coping/Psychosocial Response Interventions   Plan Of Care Reviewed With patient   Patient Care Overview   Progress improving         Problem: Pressure Ulcer Risk (Martin Scale) (Adult,Obstetrics,Pediatric)  Goal: Skin Integrity  Outcome: Ongoing (interventions implemented as appropriate)

## 2017-08-17 NOTE — PROGRESS NOTES
Postoperative day 4 status post laparoscopic hand-assisted sigmoid colectomy, splenic flexure takedown and appendectomy    S: No events overnight, passing gas, no BM. Tolerating FLD    O:   Vitals:    08/16/17 2051 08/17/17 0528 08/17/17 0815 08/17/17 1300   BP: 155/86 (!) 186/94 174/88    BP Location: Right arm Right arm Right arm    Patient Position: Lying Lying Lying    Pulse: 80 74 73 74   Resp: 18 18 16 20   Temp: 97.5 °F (36.4 °C) 97.7 °F (36.5 °C) 97.4 °F (36.3 °C)    TempSrc: Oral Oral Oral    SpO2: 96% 95% 97% 96%   Weight:       Height:         Avinash drain 125  Alert and oriented ×3, no acute distress  Chest is clear bilaterally  Regular rate and rhythm  Abdomen is soft, tender over incisions, nondistended, bowel sounds are positive.  Incisions are clean dry and intact.  She has a Avinash drain in the right lower quadrant with serosanguineous fluid  No lower extremity edemas    Results for SR BRITTANY BOLTON NATHANIEL (MRN [id number]) as of 8/17/2017 14:03   Ref. Range 8/17/2017 07:33   WBC Latest Ref Range: 4.50 - 10.70 10*3/mm3 6.63   RBC Latest Ref Range: 3.90 - 5.20 10*6/mm3 3.56 (L)   Hemoglobin Latest Ref Range: 11.9 - 15.5 g/dL 10.3 (L)   Hematocrit Latest Ref Range: 35.6 - 45.5 % 32.4 (L)     Postoperative day 4 status post laparoscopic hand-assisted sigmoid colectomy, splenic flexure takedown and appendectomy.  She is recovering extremely well.  She has history of chronic constipation and she may need to wait longer time to have a bowel movement    - Advance to regular diet  - senna BID  - DC IVF  - Switch to PO ATx  - Cont PT  - SCDs and lovenox    Cullen Munoz MD  General, Minimally Invasive and Endoscopic Surgery  [facility]    [address], [address]  [place], [place], [address]  P: [phone]  F: [phone]

## 2017-08-17 NOTE — PROGRESS NOTES
Postoperative day 3 status post laparoscopic hand-assisted sigmoid colectomy, splenic flexure takedown and appendectomy     S: No events overnight.  She feels a little bit worse today.  She has been passing gas and tolerating full liquid diet.  She is admitted to be frustrated because she did not have a bowel movement yet    O:   Vitals:    08/16/17 0700 08/16/17 1000 08/16/17 1540 08/16/17 1557   BP:    174/96   BP Location:    Right arm   Patient Position:    Lying   Pulse: 72 82 80 77   Resp: 18 18 16 18   Temp:    97.8 °F (36.6 °C)   TempSrc:    Oral   SpO2: 96% 97% 95% 98%   Weight:       Height:         I/O: 1032/3800  Drain 130 cc  Urine 3800  Alert and oriented ×3, no acute distress  Chest is clear bilaterally  Regular rate and rhythm  Abdomen is soft, tender over incisions, nondistended, bowel sounds are positive.  Incisions are clean dry and intact.  She has a Avinash drain in the right lower quadrant with serosanguineous fluid  No lower extremity edemas    WBC 8.3  Hgb 10.4  BMP within normal limits    Postoperative day 3 status post laparoscopic hand-assisted sigmoid colectomy, splenic flexure takedown and appendectomy.  She is recovering extremely well.  She has history of chronic constipation and she may need to wait longer time to have a bowel movement     - Pain control with Norco and when necessary IV meds  - Continue IV fluids  - Continue full liquid diet  - Continue Entereg  - Physical therapy evaluation  - Continue IV antibiotics  - SCDs and Lovenox    Cullen Munoz MD  General, Minimally Invasive and Endoscopic Surgery  [facility]    [address] [place] [address], [address]  [place], [place], [address]  P: [phone]  F: [phone]

## 2017-08-18 LAB
ANION GAP SERPL CALCULATED.3IONS-SCNC: 9 MMOL/L
BASOPHILS # BLD AUTO: 0.01 10*3/MM3 (ref 0–0.2)
BASOPHILS NFR BLD AUTO: 0.1 % (ref 0–1.5)
BUN BLD-MCNC: 11 MG/DL (ref 8–23)
BUN/CREAT SERPL: 15.9 (ref 7–25)
CALCIUM SPEC-SCNC: 9.4 MG/DL (ref 8.6–10.5)
CHLORIDE SERPL-SCNC: 100 MMOL/L (ref 98–107)
CO2 SERPL-SCNC: 28 MMOL/L (ref 22–29)
CREAT BLD-MCNC: 0.69 MG/DL (ref 0.57–1)
DEPRECATED RDW RBC AUTO: 50.9 FL (ref 37–54)
EOSINOPHIL # BLD AUTO: 0.52 10*3/MM3 (ref 0–0.7)
EOSINOPHIL NFR BLD AUTO: 6.6 % (ref 0.3–6.2)
ERYTHROCYTE [DISTWIDTH] IN BLOOD BY AUTOMATED COUNT: 15.3 % (ref 11.7–13)
GFR SERPL CREATININE-BSD FRML MDRD: 82 ML/MIN/1.73
GLUCOSE BLD-MCNC: 99 MG/DL (ref 65–99)
HCT VFR BLD AUTO: 31.2 % (ref 35.6–45.5)
HGB BLD-MCNC: 10.4 G/DL (ref 11.9–15.5)
IMM GRANULOCYTES # BLD: 0.08 10*3/MM3 (ref 0–0.03)
IMM GRANULOCYTES NFR BLD: 1 % (ref 0–0.5)
LYMPHOCYTES # BLD AUTO: 1.6 10*3/MM3 (ref 0.9–4.8)
LYMPHOCYTES NFR BLD AUTO: 20.4 % (ref 19.6–45.3)
MCH RBC QN AUTO: 30.2 PG (ref 26.9–32)
MCHC RBC AUTO-ENTMCNC: 33.3 G/DL (ref 32.4–36.3)
MCV RBC AUTO: 90.7 FL (ref 80.5–98.2)
MONOCYTES # BLD AUTO: 0.71 10*3/MM3 (ref 0.2–1.2)
MONOCYTES NFR BLD AUTO: 9 % (ref 5–12)
NEUTROPHILS # BLD AUTO: 4.94 10*3/MM3 (ref 1.9–8.1)
NEUTROPHILS NFR BLD AUTO: 62.9 % (ref 42.7–76)
PLATELET # BLD AUTO: 240 10*3/MM3 (ref 140–500)
PMV BLD AUTO: 8.8 FL (ref 6–12)
POTASSIUM BLD-SCNC: 4.2 MMOL/L (ref 3.5–5.2)
RBC # BLD AUTO: 3.44 10*6/MM3 (ref 3.9–5.2)
SODIUM BLD-SCNC: 137 MMOL/L (ref 136–145)
WBC NRBC COR # BLD: 7.86 10*3/MM3 (ref 4.5–10.7)

## 2017-08-18 PROCEDURE — 99024 POSTOP FOLLOW-UP VISIT: CPT | Performed by: SURGERY

## 2017-08-18 PROCEDURE — 94799 UNLISTED PULMONARY SVC/PX: CPT

## 2017-08-18 PROCEDURE — 25010000002 ENOXAPARIN PER 10 MG: Performed by: SURGERY

## 2017-08-18 PROCEDURE — 80048 BASIC METABOLIC PNL TOTAL CA: CPT | Performed by: SURGERY

## 2017-08-18 PROCEDURE — 85025 COMPLETE CBC W/AUTO DIFF WBC: CPT | Performed by: SURGERY

## 2017-08-18 RX ORDER — POLYETHYLENE GLYCOL 3350 17 G/17G
17 POWDER, FOR SOLUTION ORAL DAILY
Status: DISCONTINUED | OUTPATIENT
Start: 2017-08-18 | End: 2017-08-19 | Stop reason: HOSPADM

## 2017-08-18 RX ADMIN — ALBUTEROL SULFATE 2.5 MG: 2.5 SOLUTION RESPIRATORY (INHALATION) at 12:47

## 2017-08-18 RX ADMIN — HYDROCODONE BITARTRATE AND ACETAMINOPHEN 1 TABLET: 5; 325 TABLET ORAL at 19:45

## 2017-08-18 RX ADMIN — AMOXICILLIN AND CLAVULANATE POTASSIUM 1 TABLET: 875; 125 TABLET, FILM COATED ORAL at 21:09

## 2017-08-18 RX ADMIN — ALVIMOPAN 12 MG: 12 CAPSULE ORAL at 17:56

## 2017-08-18 RX ADMIN — TIMOLOL MALEATE 1 DROP: 5 SOLUTION OPHTHALMIC at 10:19

## 2017-08-18 RX ADMIN — ENOXAPARIN SODIUM 30 MG: 30 INJECTION SUBCUTANEOUS at 21:10

## 2017-08-18 RX ADMIN — ALVIMOPAN 12 MG: 12 CAPSULE ORAL at 10:19

## 2017-08-18 RX ADMIN — PANTOPRAZOLE SODIUM 40 MG: 40 TABLET, DELAYED RELEASE ORAL at 06:04

## 2017-08-18 RX ADMIN — ENOXAPARIN SODIUM 30 MG: 30 INJECTION SUBCUTANEOUS at 10:19

## 2017-08-18 RX ADMIN — LOSARTAN POTASSIUM 100 MG: 50 TABLET, FILM COATED ORAL at 10:18

## 2017-08-18 RX ADMIN — LEVOTHYROXINE SODIUM 25 MCG: 25 TABLET ORAL at 06:04

## 2017-08-18 RX ADMIN — ALBUTEROL SULFATE 2.5 MG: 2.5 SOLUTION RESPIRATORY (INHALATION) at 16:33

## 2017-08-18 RX ADMIN — POLYETHYLENE GLYCOL (3350) 17 G: 17 POWDER, FOR SOLUTION ORAL at 10:19

## 2017-08-18 RX ADMIN — AMOXICILLIN AND CLAVULANATE POTASSIUM 1 TABLET: 875; 125 TABLET, FILM COATED ORAL at 10:19

## 2017-08-18 RX ADMIN — TIMOLOL MALEATE 1 DROP: 5 SOLUTION OPHTHALMIC at 21:10

## 2017-08-18 NOTE — PROGRESS NOTES
Postoperative day 5 status post laparoscopic hand-assisted sigmoid colectomy, splenic flexure takedown and appendectomy    S: No events overnight, tolerating diet, passing gas, no BM yet.    O:   Vitals:    08/17/17 2107 08/17/17 2124 08/18/17 0419 08/18/17 0757   BP: 158/87  146/83 172/83   BP Location: Left arm  Left arm Left arm   Patient Position: Lying  Lying Lying   Pulse: 68 67 66 67   Resp: 16 18 16 16   Temp: 98.8 °F (37.1 °C)  97.8 °F (36.6 °C) 98 °F (36.7 °C)   TempSrc: Oral  Oral Oral   SpO2: 92% 97% 95% 96%   Weight:       Height:         Kaelyn drain 45 cc  Alert and oriented ×3, no acute distress  Chest is clear bilaterally  Regular rate and rhythm  Abdomen is soft, tender over incisions, nondistended, bowel sounds are positive.  Incisions are clean dry and intact.  She has a Kaelyn drain in the right lower quadrant with serosanguineous fluid  No lower extremity edemas    WBC 7.8  Hgb 10.4    All other labs WNL    Postoperative day 5 status post laparoscopic hand-assisted sigmoid colectomy, splenic flexure takedown and appendectomy.  She is recovering extremely well.  She has history of chronic constipation and she may need to wait longer time to have a bowel movement    - Cont regular diet  - senna BID  - Miralax today  - DC kaelyn drain  - PO ATx for total of 15 days  - Cont PT, please eval for rehab   - SCDs and lovenox    Cullen Munoz MD  General, Minimally Invasive and Endoscopic Surgery  [facility]    [address] [place] [address], [address]  [place], [place], [address]  P: [phone]  F: [phone]

## 2017-08-18 NOTE — PROGRESS NOTES
Postoperative day 5 status post laparoscopic hand-assisted sigmoid colectomy, splenic flexure takedown and appendectomy    S: There were not events overnight.  The patient tolerated regular diet.  She has been passing gas but didn't have any bowel movement yet.    O:   Vitals:    08/18/17 0419 08/18/17 0757 08/18/17 1241 08/18/17 1247   BP: 146/83 172/83 118/77    BP Location: Left arm Left arm Right arm    Patient Position: Lying Lying Sitting    Pulse: 66 67 73 71   Resp: 16 16 18 20   Temp: 97.8 °F (36.6 °C) 98 °F (36.7 °C) 97.6 °F (36.4 °C)    TempSrc: Oral Oral Oral    SpO2: 95% 96% 97% 94%   Weight:       Height:         Avinash drain 45 cc  Alert and oriented ×3, no acute distress  Chest is clear bilaterally  Regular rate and rhythm  Abdomen is soft, not tender nondistended, bowel sounds are positive.  Incisions are clean dry and intact.  She has a Avinash drain in the right lower quadrant with serous fluid  No lower extremity edemas    Results for SR CHE BRITTANY ARMSTRONG (MRN [id number]) as of 8/18/2017 14:47   Ref. Range 8/18/2017 06:39   WBC Latest Ref Range: 4.50 - 10.70 10*3/mm3 7.86   RBC Latest Ref Range: 3.90 - 5.20 10*6/mm3 3.44 (L)   Hemoglobin Latest Ref Range: 11.9 - 15.5 g/dL 10.4 (L)   Hematocrit Latest Ref Range: 35.6 - 45.5 % 31.2 (L)     Postoperative day 5 status post laparoscopic hand-assisted sigmoid colectomy, splenic flexure takedown and appendectomy.  She is recovering extremely well.  She has history of chronic constipation and she may need to wait longer time to have a bowel movement. She received milk of magnesia yesterday.  Passing lots of gas, tolerating regular diet.  No bowel movement yet    - Continue regular diet  - Continue Entereg  - Will give MiraLAX  - senna BID  - PO ATx until 8/27  - Cont PT  - SCDs and lovenox    Cullen Munoz MD  General, Minimally Invasive and Endoscopic Surgery  [facility]    [address], [address]  [place], [place], [address]  P:  [phone]  F: [phone]

## 2017-08-18 NOTE — PLAN OF CARE
Problem: Patient Care Overview (Adult)  Goal: Plan of Care Review  Outcome: Ongoing (interventions implemented as appropriate)    08/17/17 0219 08/17/17 2000 08/18/17 0153   Coping/Psychosocial Response Interventions   Plan Of Care Reviewed With --  patient --    Patient Care Overview   Progress improving --  --    Outcome Evaluation   Outcome Summary/Follow up Plan --  --  Up ad rj. No bm yet. Bowel sounds audible. Abdominal lap sites and mid-line incision open to air, closed with staples. Pedal edema. Non-productive cough at times. ITALO drain with small amount serosangerous output.        Goal: Adult Individualization and Mutuality  Outcome: Ongoing (interventions implemented as appropriate)  Goal: Discharge Needs Assessment  Outcome: Ongoing (interventions implemented as appropriate)    Problem: Bowel Resection (Adult)  Goal: Signs and Symptoms of Listed Potential Problems Will be Absent or Manageable (Bowel Resection)  Outcome: Ongoing (interventions implemented as appropriate)    Problem: Perioperative Period (Adult)  Goal: Signs and Symptoms of Listed Potential Problems Will be Absent or Manageable (Perioperative Period)  Outcome: Ongoing (interventions implemented as appropriate)    Problem: Pressure Ulcer Risk (Martin Scale) (Adult,Obstetrics,Pediatric)  Goal: Skin Integrity  Outcome: Ongoing (interventions implemented as appropriate)

## 2017-08-18 NOTE — PLAN OF CARE
Problem: Patient Care Overview (Adult)  Goal: Plan of Care Review  Outcome: Ongoing (interventions implemented as appropriate)    08/18/17 1613   Coping/Psychosocial Response Interventions   Plan Of Care Reviewed With patient   Patient Care Overview   Progress improving   Outcome Evaluation   Outcome Summary/Follow up Plan Up ad rj. Ambulating halls most of day. Pt took shower. ITALO Drain pulled with small serosangerous output, no c/o pain at this time. Bowel sounds hypoactive but no bm yet. Miralax given. Lap sites x3, midline incision open to air, with staples intact, no redness, drainage or swelling. VSS.       Goal: Adult Individualization and Mutuality  Outcome: Ongoing (interventions implemented as appropriate)  Goal: Discharge Needs Assessment  Outcome: Ongoing (interventions implemented as appropriate)    Problem: Bowel Resection (Adult)  Goal: Signs and Symptoms of Listed Potential Problems Will be Absent or Manageable (Bowel Resection)  Outcome: Ongoing (interventions implemented as appropriate)    Problem: Perioperative Period (Adult)  Goal: Signs and Symptoms of Listed Potential Problems Will be Absent or Manageable (Perioperative Period)  Outcome: Ongoing (interventions implemented as appropriate)    Problem: Pressure Ulcer Risk (Martin Scale) (Adult,Obstetrics,Pediatric)  Goal: Skin Integrity  Outcome: Ongoing (interventions implemented as appropriate)

## 2017-08-18 NOTE — PROGRESS NOTES
Continued Stay Note  [facility]     Patient Name: SR Ghada Ryan  MRN: [id number]  Today's Date: 8/18/2017    Admit Date: 8/12/2017          Discharge Plan       08/18/17 1322    Case Management/Social Work Plan    [facility] No needs noted. Continue to follow.    Patient/Family In Agreement With Plan yes    Additional Comments Spoke with patient at bedside. Discussed PT notes and recommendations. No need for HH or SNF. Says she spoke with  who is also in agreement.              Discharge Codes     None            Alyce Duran RN

## 2017-08-18 NOTE — SIGNIFICANT NOTE
08/18/17 1159   Rehab Treatment   Discipline physical therapist   Rehab Evaluation   Evaluation Not Performed (observed pt ambulate on unit supervision 400ft no equipment needs, no sklled PT warranted. has been up ad rj on unit. spoke with CCP,. will defer eval. )

## 2017-08-19 VITALS
RESPIRATION RATE: 18 BRPM | DIASTOLIC BLOOD PRESSURE: 78 MMHG | SYSTOLIC BLOOD PRESSURE: 141 MMHG | TEMPERATURE: 98.2 F | WEIGHT: 185 LBS | OXYGEN SATURATION: 94 % | HEIGHT: 63 IN | HEART RATE: 90 BPM | BODY MASS INDEX: 32.78 KG/M2

## 2017-08-19 PROCEDURE — 94799 UNLISTED PULMONARY SVC/PX: CPT

## 2017-08-19 PROCEDURE — 25010000002 ENOXAPARIN PER 10 MG: Performed by: SURGERY

## 2017-08-19 PROCEDURE — 99024 POSTOP FOLLOW-UP VISIT: CPT | Performed by: SURGERY

## 2017-08-19 RX ORDER — HYDROCODONE BITARTRATE AND ACETAMINOPHEN 5; 325 MG/1; MG/1
1 TABLET ORAL EVERY 6 HOURS PRN
Qty: 15 TABLET | Refills: 0 | Status: SHIPPED | OUTPATIENT
Start: 2017-08-19 | End: 2017-08-24

## 2017-08-19 RX ORDER — BISACODYL 10 MG
10 SUPPOSITORY, RECTAL RECTAL DAILY
Status: DISCONTINUED | OUTPATIENT
Start: 2017-08-19 | End: 2017-08-19 | Stop reason: HOSPADM

## 2017-08-19 RX ORDER — POLYETHYLENE GLYCOL 3350 17 G/17G
17 POWDER, FOR SOLUTION ORAL DAILY
Qty: 1 EACH | Refills: 0 | Status: SHIPPED | OUTPATIENT
Start: 2017-08-19 | End: 2018-08-03 | Stop reason: SDUPTHER

## 2017-08-19 RX ORDER — AMOXICILLIN AND CLAVULANATE POTASSIUM 875; 125 MG/1; MG/1
1 TABLET, FILM COATED ORAL EVERY 12 HOURS SCHEDULED
Qty: 10 TABLET | Refills: 0 | Status: SHIPPED | OUTPATIENT
Start: 2017-08-19 | End: 2017-08-29

## 2017-08-19 RX ORDER — ONDANSETRON 4 MG/1
4 TABLET, FILM COATED ORAL DAILY PRN
Qty: 20 TABLET | Refills: 1 | Status: SHIPPED | OUTPATIENT
Start: 2017-08-19 | End: 2017-08-29

## 2017-08-19 RX ADMIN — LOSARTAN POTASSIUM 100 MG: 50 TABLET, FILM COATED ORAL at 08:53

## 2017-08-19 RX ADMIN — HYDROCODONE BITARTRATE AND ACETAMINOPHEN 1 TABLET: 5; 325 TABLET ORAL at 07:18

## 2017-08-19 RX ADMIN — PANTOPRAZOLE SODIUM 40 MG: 40 TABLET, DELAYED RELEASE ORAL at 05:26

## 2017-08-19 RX ADMIN — AMOXICILLIN AND CLAVULANATE POTASSIUM 1 TABLET: 875; 125 TABLET, FILM COATED ORAL at 08:53

## 2017-08-19 RX ADMIN — ALBUTEROL SULFATE 2.5 MG: 2.5 SOLUTION RESPIRATORY (INHALATION) at 11:13

## 2017-08-19 RX ADMIN — TIMOLOL MALEATE 1 DROP: 5 SOLUTION OPHTHALMIC at 08:54

## 2017-08-19 RX ADMIN — BISACODYL 10 MG: 10 SUPPOSITORY RECTAL at 11:06

## 2017-08-19 RX ADMIN — LEVOTHYROXINE SODIUM 25 MCG: 25 TABLET ORAL at 05:24

## 2017-08-19 RX ADMIN — ALVIMOPAN 12 MG: 12 CAPSULE ORAL at 08:53

## 2017-08-19 RX ADMIN — POLYETHYLENE GLYCOL (3350) 17 G: 17 POWDER, FOR SOLUTION ORAL at 08:54

## 2017-08-19 RX ADMIN — ENOXAPARIN SODIUM 30 MG: 30 INJECTION SUBCUTANEOUS at 08:54

## 2017-08-19 NOTE — DISCHARGE INSTRUCTIONS
COLON, GASTRIC OR SMALL BOWEL RESECTION  POST OP RECOMMENDATIONS  Dr. Cullen Munoz  [phone]  ACTIVITIES:  1. Expect to rest most of the first week after discharge from hospital, but do get up several times daily to reduce the risk of getting a clot in your legs.  2. No strenuous activity or lifting over 10 lbs. for two weeks.  Add 5 lbs. a week to that restriction until 6 weeks out from surgery.  Try to avoid squatting and deep bends for about a week.  3. No driving until seen at your post operative appointment.  You must be off pain meds 24 hours before driving after that visit unless otherwise instructed.  4. You can climb stairs, but minimize this and initially do one step at a time (both feet on one step rather than going up with each step.)  SYMPTOMS:  1. Avoiding constipation is important after this surgery as it is common when taking pain medication.  Over the counter laxatives, such as Miralax, can be used temporarily to avoid this.   2. Fatigue and decreased stamina is not unusual for about a week or so after surgery due to anesthesia.  Try to take walks and some mild activity between resting.  3. Shoulder pain is not unusual from the “gas” used in laparoscopy which will dissipate within 1-3 days.  If it does not, call your physician.  4. It is not unusual for your gastrointestinal system to take 1-2 months to get back to your normal routine and you may have long term changes towards looser bowel movements  WOUND SITE:  1. Dressings can be removed 7-10 days after procedure if desired.  The “tega-derm” may be removed in 3 days if instructed to.  2. Dressings may occasionally have spots of blood on them.  As long as it is dry, these do not need to be changed.  If it is soaked, then the dressing should be removed and a new dressing placed.  3. Skin irritation, redness or itching can prompt removal of the bandage earlier if present.  4. Steri-strips are to be left in place until they fall off on their own in  1-2 weeks.  If they are irritating then they may be removed sooner.  5. No showering if a drain is in place. Once removed, and the skin is covered, you may shower.    6. Showering may occur while the “tega-derm” is in place.  If it is off, then you must wait 3 days after surgery before showering.  MEALS:  1. A soft diet is recommended for the first 1-2 days after discharge from the hospital.  A normal diet may then be started as tolerated after that. Follow dietary guidelines when specified  2. Expect to eat less after this procedure and fill up somewhat faster.   3. Do NOT take pain pills on an empty stomach.  WORK:  1. In general if you have a sedentary job, you can return to work in 3 weeks if done laparoscopically.  If lifting or exertion is required it may be 3-6 weeks depending on your recovery.    2. Use discretion and remember that your stamina will be decreased post operatively.  3. Return to work notes can be provided at the time of your post-operative appointment.  FOLLOW UP:  1. Call and make a post-operative appointment for approximately 10-14 days after the procedure.

## 2017-08-19 NOTE — PROGRESS NOTES
Postoperative day 6 status post laparoscopic hand-assisted sigmoid colectomy, splenic flexure takedown and appendectomy    S: Positive flatus, with no BM still.  No nausea.  Up and about.  No leakage from drain site, removed yesterday.      O:   Vitals:    08/18/17 1851 08/18/17 2116 08/19/17 0500 08/19/17 0750   BP: 136/89 132/83 138/78 148/83   BP Location: Left arm Left arm Left arm Right arm   Patient Position: Lying Lying Lying Sitting   Pulse:  73 70 72   Resp: 18 18 18 16   Temp:  99 °F (37.2 °C) 97.4 °F (36.3 °C) 97 °F (36.1 °C)   TempSrc:  Oral Oral Oral   SpO2:  93% 95% 98%   Weight:       Height:           Alert and oriented ×3, no acute distress  Chest is clear bilaterally  Regular rate and rhythm  Abdomen is soft, not tender nondistended, bowel sounds are positive.  Incisions are clean dry and intact.    No lower extremity edemas    Postoperative day 6 status post laparoscopic hand-assisted sigmoid colectomy, splenic flexure takedown and appendectomy.  She is recovering extremely well.  She has history of chronic constipation and she may need to wait longer time to have a bowel movement. She received milk of magnesia yesterday.  Passing lots of gas, tolerating regular diet.  No bowel movement yet    - Continue regular diet  - Continue Entereg  - Will give MiraLAX  - senna BID  - PO ATx until 8/27  - Cont PT  - SCDs and lovenox  -dulcolox suppository    Adamaris Bhandari MD    Large bowel movement with suppository, so will DC today.    Adamaris Bhandari MD  08/19/17

## 2017-08-19 NOTE — PLAN OF CARE
Problem: Patient Care Overview (Adult)  Goal: Plan of Care Review  Outcome: Ongoing (interventions implemented as appropriate)    08/19/17 0420   Coping/Psychosocial Response Interventions   Plan Of Care Reviewed With patient   Patient Care Overview   Progress improving   Outcome Evaluation   Outcome Summary/Follow up Plan Up ad rj in room. No requests for pain meds tonight and denies nausea. ITALO drain removed yesterday. Lap sites x 3. Midline abd incision open to air. staples intact. NO bm tonight         Problem: Bowel Resection (Adult)  Goal: Signs and Symptoms of Listed Potential Problems Will be Absent or Manageable (Bowel Resection)  Outcome: Ongoing (interventions implemented as appropriate)    Problem: Perioperative Period (Adult)  Goal: Signs and Symptoms of Listed Potential Problems Will be Absent or Manageable (Perioperative Period)  Outcome: Ongoing (interventions implemented as appropriate)    Problem: Pressure Ulcer Risk (Martin Scale) (Adult,Obstetrics,Pediatric)  Goal: Skin Integrity  Outcome: Ongoing (interventions implemented as appropriate)

## 2017-08-27 PROBLEM — M17.0 PRIMARY OSTEOARTHRITIS OF BOTH KNEES: Status: ACTIVE | Noted: 2017-08-27

## 2017-08-27 PROBLEM — M19.079 DJD (DEGENERATIVE JOINT DISEASE), ANKLE AND FOOT: Status: ACTIVE | Noted: 2017-08-27

## 2017-08-27 RX ORDER — LIDOCAINE HYDROCHLORIDE 10 MG/ML
4 INJECTION, SOLUTION INFILTRATION; PERINEURAL
Status: COMPLETED | OUTPATIENT
Start: 2017-08-09 | End: 2017-08-09

## 2017-08-27 RX ORDER — BUPIVACAINE HYDROCHLORIDE 5 MG/ML
4 INJECTION, SOLUTION EPIDURAL; INTRACAUDAL
Status: COMPLETED | OUTPATIENT
Start: 2017-08-09 | End: 2017-08-09

## 2017-08-27 RX ORDER — TRIAMCINOLONE ACETONIDE 40 MG/ML
2 INJECTION, SUSPENSION INTRA-ARTICULAR; INTRAMUSCULAR
Status: COMPLETED | OUTPATIENT
Start: 2017-08-09 | End: 2017-08-09

## 2017-08-27 RX ORDER — TRIAMCINOLONE ACETONIDE 40 MG/ML
80 INJECTION, SUSPENSION INTRA-ARTICULAR; INTRAMUSCULAR
Status: COMPLETED | OUTPATIENT
Start: 2017-08-09 | End: 2017-08-09

## 2017-08-27 RX ORDER — LIDOCAINE HYDROCHLORIDE 10 MG/ML
2 INJECTION, SOLUTION INFILTRATION; PERINEURAL
Status: COMPLETED | OUTPATIENT
Start: 2017-08-09 | End: 2017-08-09

## 2017-08-28 NOTE — DISCHARGE SUMMARY
Discharge Summary    Patient name: SR Ghada Ryan    Medical record number: [id number]    Admission date: 8/12/2017  Discharge date:  8/19/2017    Attending physician: Cullen Munoz MD    Primary care physician: Jaclyn Escalante MD    Referring physician: Cullen Munoz MD  [address] [address]    Consulting physician(s): None    Condition on discharge: stable    Admitting diagnosis:   Patient Active Problem List   Diagnosis   • Diverticulitis of large intestine with perforation and abscess without bleeding   • Diverticulitis of colon with perforation   • DJD (degenerative joint disease), ankle and foot   • Primary osteoarthritis of both knees       Final diagnosis: Same    Procedures: Laparoscopic hand assisted sigmoid colectomy    History of present illness: The patient is a very pleasant 81 y.o. female that was admitted to the hospital with sigmoid colon perforation. She was admitted for surgical treatment.    Hospital course: The patient was admitted to the hospital and resuscitated with IV fluids and IV antibiotics.  I recommended the patient undergoes laparoscopic hand-assisted sigmoid colectomy.  She underwent sigmoid colectomy on admission day 1.  Her postoperative course was uneventful.  She started passing gas on postoperative day 1.  She was started on clear liquid diet and Reeder catheter was removed.  She was advanced to full liquid diet on postoperative day 2.  He pain was well controlled.  Postoperative day 4 she was advanced to regular diet and tolerated very well.  Avinash drain was removed on postoperative day 5.  On postoperative day 6 she had a large bowel movement, continued to tolerate diet so decision was to discharge the patient home in stable condition.    Discharge medications:    SR Ghada Ryan   Home Medication Instructions JOSEPH:[id number]    Printed on:08/28/17 5459   Medication Information                      albuterol  (PROVENTIL HFA;VENTOLIN HFA) 108 (90 BASE) MCG/ACT inhaler  Inhale 2 puffs.             albuterol (PROVENTIL) (2.5 MG/3ML) 0.083% nebulizer solution  INHALE 3 MILLILITERS (2.5 MG) BY NEBULIZATION ROUTE EVERY 6 HOURS AS NEEDED             amoxicillin-clavulanate (AUGMENTIN) 875-125 MG per tablet  Take 1 tablet by mouth Every 12 (Twelve) Hours. Indications: Infection Within the Abdomen             beclomethasone (QVAR) 80 MCG/ACT inhaler  INHALE 1 PUFF INTO THE LUNGS 2 (TWO) TIMES DAILY             levothyroxine (SYNTHROID, LEVOTHROID) 25 MCG tablet  Take 25 mcg by mouth.             losartan (COZAAR) 100 MG tablet  TAKE 1 TABLET BY MOUTH DAILY             meloxicam (MOBIC) 7.5 MG tablet  Take 7.5 mg by mouth.             montelukast (SINGULAIR) 10 MG tablet  Take 10 mg by mouth.             neomycin-polymyxin-dexamethasone (MAXITROL) 3.5-48127-5.1 ophthalmic suspension  1 DROP IN BOTH EYES FOUR TIMES A DAY INSTILL IN EYES             omeprazole (priLOSEC) 20 MG capsule  Take 20 mg by mouth.             ondansetron (ZOFRAN) 4 MG tablet  Take 1 tablet by mouth Daily As Needed for Nausea or Vomiting for up to 20 doses.             polyethylene glycol (MIRALAX) packet  Take 17 g by mouth Daily.             polyethylene glycol (MIRALAX) powder  Take 17 g by mouth Daily.             simvastatin (ZOCOR) 40 MG tablet  TAKE 1 TABLET BY MOUTH NIGHTLY             timolol (TIMOPTIC) 0.5 % ophthalmic solution  INSTILL 1 DROP IN BOTH EYES TWICE A DAY                 Discharge instructions:    - No heavy lifting of more than 15 lb for 6 weeks. Can start doing aerobic type exercise in 4 weeks.   - No driving while taking pain medications  - Take medications as prescribed.   - Can shower, no bath tub    Follow-up appointment: Follow up with Dr. Munoz in the office in 10 days. Call for appointment at [phone].    CODE STATUS: Full code

## 2017-08-29 ENCOUNTER — OFFICE VISIT (OUTPATIENT)
Dept: SURGERY | Facility: CLINIC | Age: 82
End: 2017-08-29

## 2017-08-29 DIAGNOSIS — Z09 POSTOP CHECK: ICD-10-CM

## 2017-08-29 DIAGNOSIS — L08.9 INFECTED WOUND: Primary | ICD-10-CM

## 2017-08-29 DIAGNOSIS — T14.8XXA INFECTED WOUND: Primary | ICD-10-CM

## 2017-08-29 PROCEDURE — 99024 POSTOP FOLLOW-UP VISIT: CPT | Performed by: SURGERY

## 2017-08-29 RX ORDER — AMOXICILLIN AND CLAVULANATE POTASSIUM 875; 125 MG/1; MG/1
1 TABLET, FILM COATED ORAL 2 TIMES DAILY
Qty: 14 TABLET | Refills: 0 | Status: SHIPPED | OUTPATIENT
Start: 2017-08-29 | End: 2017-09-05

## 2017-08-29 NOTE — PATIENT INSTRUCTIONS
A high fiber diet with plenty of fluids (up to 8 glasses of water daily) is suggested to relieve these symptoms.  Metamucil, 1 tablespoon once or twice daily can be used to keep bowels regular if needed.

## 2017-08-29 NOTE — PROGRESS NOTES
"CHIEF COMPLAINT:   Chief Complaint   Patient presents with   • Post-op     Laparoscopic hand-assisted sigmoid colectomy, splenic flexure mobilization, incidental appendectomy 8/13/17       HISTORY OF PRESENT ILLNESS:  This is a 81 y.o. female who presents for a post-operative visit after undergoing a Laparoscopic hand assisted sigmoid colectomy and appendectomy on 8/13/17    She was seen last Friday my office because of redness around the midline wound.  The lower aspect.  The incision was opened and pus was drained.  She has been applying dry dressings over.  She reports small amount of drainage that is purulent in nature    Patient reports she is doing well. Eating well without any significant nausea. Having good bowel function. No problems with constipation or diarrhea. No urinary complaints. Denies fever. Ambulating well and slowly returning to normal activities.    Pathology:   Final Diagnosis   SEGMENT OF LARGE INTESTINE \"SIGMOID\":                         ACUTE AND CHRONIC DIVERTICULITIS.                         EVIDENCE OF DIVERTICULAR RUPTURE/PERFORATION WITH ASSOCIATED ABSCESS AND ACUTE                                                 SEROSITIS.     APPENDIX: NO SIGNIFICANT HISTOLOGIC ABNORMALITY.     PHYSICAL EXAM:  Lungs: Clear  Heart:RRR  ABD: Soft and nontender, nondistended.  She has a partially opened midline wound in the inferior aspect that has some purulent drainage.  There is no evidence of fascial dehiscence.  There is no surrounding erythema.  Laparoscopic incisions are well-healed with staplers  Ext: no significant edema, calves nontender    A/P:  This is a 81 y.o. female who is s/p Laparoscopic hand assisted sigmoid colectomy and appendectomy. Tolerating diet and having normal bowel movements.  She developed a wound infection and the inferior aspect of the wound was opened.  This is an expected risk after perforated sigmoid diverticulitis.  I packed the wound with saline and gauze.  I " instructed the patient about the need to perform wet-to-dry dressing changes with saline and gauze at least 2 times a day.  I have offered the patient to have a family member come to the office and get teaching by dressing changes.  The patient preferred to have home health and training at home.  Staplers and the laparoscopic incisions were removed and Steri-Strips were placed    - Home health for wet-to-dry dressing changes with saline and gauze 3 times a day  - Patient to follow-up in my office in one week  - I ordered Augmentin for 7 days  - High-fiber diet and Metamucil daily  - MiraLAX daily as needed    Pathology report reviewed and related to the patient    I advised the patient to continue to refrain from doing any heavy lifting of more than 15 pounds for a total of 6 weeks.  Patient may start doing an aerobic type exercise in 4 weeks after surgery.    Patient was given time to ask questions and all of them were answered appropriately.  The patient verbalized understanding and agreed with the plan.    Cullen Munoz MD  General, Minimally Invasive and Endoscopic Surgery  [facility]    [address] [place] [address], [address]  [place], [place], [address]  P: [phone]  F: [phone]

## 2017-09-05 ENCOUNTER — OFFICE VISIT (OUTPATIENT)
Dept: SURGERY | Facility: CLINIC | Age: 82
End: 2017-09-05

## 2017-09-05 DIAGNOSIS — Z09 POSTOP CHECK: Primary | ICD-10-CM

## 2017-09-05 PROCEDURE — 99024 POSTOP FOLLOW-UP VISIT: CPT | Performed by: SURGERY

## 2017-09-05 NOTE — PROGRESS NOTES
Cc: Follow up for wound check    S: The patient is a very pleasant 81 y.o. female who presents for a post-operative visit after undergoing a Laparoscopic hand assisted sigmoid colectomy and appendectomy on 8/13/17.  She is here today for follow-up and wound check.  She has been doing wet-to-dry dressing changes twice a day.  Denies any drainage.  Has been having 2-3 bowel movements daily that are well formed.    O: Alert and oriented ×3  Abdomen is soft, nontender and nondistended.  She has a open infraumbilical incision with granulation tissue.  The superior aspect of the wound has some serous exudate with short area of dehiscence.  There is serous drainage no pus    Assessment and plan  81-year-old female status post laparoscopic hand-assisted sigmoid colectomy and appendectomy.  She has been having regular bowel movements and tolerating diet without any problem.  She has been doing wet-to-dry dressing changes with saline and gauze at least twice a day.  Her wound looks great with granulation tissue and this inferior aspect.  The superior aspect was opened and packed with saline and gauze.    - Home health for wet-to-dry dressing changes with saline and gauze at least 2 times a day  - Patient to follow-up in my office in 2 weeks  - High-fiber diet and Metamucil daily  - MiraLAX daily as needed    The patient verbalized understanding and agreed with the plan    Cullen Munoz MD  General, Minimally Invasive and Endoscopic Surgery  [facility]    [address], [address]  [place], [place], [address]  P: [phone]  F: [phone]

## 2017-09-19 ENCOUNTER — OFFICE VISIT (OUTPATIENT)
Dept: SURGERY | Facility: CLINIC | Age: 82
End: 2017-09-19

## 2017-09-19 DIAGNOSIS — Z51.89 VISIT FOR WOUND CHECK: ICD-10-CM

## 2017-09-19 DIAGNOSIS — Z09 POSTOP CHECK: Primary | ICD-10-CM

## 2017-09-19 PROCEDURE — 99024 POSTOP FOLLOW-UP VISIT: CPT | Performed by: SURGERY

## 2017-09-20 NOTE — PROGRESS NOTES
CC: Follow-up for wound check    S: The patient is here today for follow-up after undergoing laparoscopic hand-assisted sigmoid colectomy for sigmoid colon perforation.  She developed a wound infection after that was treated with wet-to-dry dressing changes.  She is here today for wound check.  She reports no drainage.  She has been having bloating and feeling gassy.  He has not started high fiber diet.    O: On physical examination her midline wound is completely healed.  The skin had grown over the granulation tissue.  There is no evidence of hernia.  All the laparoscopic incisions are well-healed    Assessment and plan    The patient is a very pleasant 81-year-old female status post laparoscopic sigmoid colectomy for perforated diverticulitis with peritonitis.  She developed a wound infection.  Her wound had healed beautifully.  There is no evidence of hernias.  She feels bloated and gassy.  I have recommended her to start a high fiber diet and take Metamucil on a daily basis.  Had recent colonoscopy and do not need repeat colonoscopy    - Metamucil a high fiber diet on a daily basis  - Can start doing heavy lifting in one week  - Exercise and weight loss  - Follow-up in my office when necessary    Cullen Munoz MD  General, Minimally Invasive and Endoscopic Surgery  [facility]    [address], [address]  [place], [place], [address]  P: [phone]  F: [phone]

## 2017-09-26 ENCOUNTER — OFFICE VISIT (OUTPATIENT)
Dept: FAMILY MEDICINE CLINIC | Facility: CLINIC | Age: 82
End: 2017-09-26

## 2017-09-26 VITALS
WEIGHT: 186 LBS | OXYGEN SATURATION: 96 % | HEIGHT: 63 IN | SYSTOLIC BLOOD PRESSURE: 116 MMHG | DIASTOLIC BLOOD PRESSURE: 78 MMHG | HEART RATE: 74 BPM | BODY MASS INDEX: 32.96 KG/M2

## 2017-09-26 DIAGNOSIS — E78.49 OTHER HYPERLIPIDEMIA: ICD-10-CM

## 2017-09-26 DIAGNOSIS — E03.9 UNSPECIFIED HYPOTHYROIDISM: ICD-10-CM

## 2017-09-26 DIAGNOSIS — E21.0 PRIMARY HYPERPARATHYROIDISM (HCC): ICD-10-CM

## 2017-09-26 DIAGNOSIS — K21.9 GASTROESOPHAGEAL REFLUX DISEASE WITHOUT ESOPHAGITIS: ICD-10-CM

## 2017-09-26 DIAGNOSIS — G47.33 OBSTRUCTIVE SLEEP APNEA SYNDROME: ICD-10-CM

## 2017-09-26 DIAGNOSIS — I10 ESSENTIAL HYPERTENSION: ICD-10-CM

## 2017-09-26 DIAGNOSIS — J45.30 MILD PERSISTENT ASTHMA WITHOUT COMPLICATION: ICD-10-CM

## 2017-09-26 DIAGNOSIS — K57.20 DIVERTICULITIS OF COLON WITH PERFORATION: ICD-10-CM

## 2017-09-26 DIAGNOSIS — Z23 NEED FOR VACCINATION: ICD-10-CM

## 2017-09-26 DIAGNOSIS — Z00.00 ROUTINE GENERAL MEDICAL EXAMINATION AT A HEALTH CARE FACILITY: Primary | ICD-10-CM

## 2017-09-26 DIAGNOSIS — M17.0 PRIMARY OSTEOARTHRITIS OF BOTH KNEES: ICD-10-CM

## 2017-09-26 PROBLEM — E21.3 HYPERPARATHYROIDISM (HCC): Status: ACTIVE | Noted: 2017-09-26

## 2017-09-26 PROBLEM — J45.909 ASTHMA: Status: ACTIVE | Noted: 2017-09-26

## 2017-09-26 PROBLEM — E78.5 HYPERLIPIDEMIA: Status: ACTIVE | Noted: 2017-09-26

## 2017-09-26 PROBLEM — E83.52 HYPERCALCEMIA: Status: ACTIVE | Noted: 2017-09-26

## 2017-09-26 PROBLEM — E07.9 THYROID DISEASE: Status: ACTIVE | Noted: 2017-09-26

## 2017-09-26 PROBLEM — E55.9 VITAMIN D INSUFFICIENCY: Status: ACTIVE | Noted: 2017-01-17

## 2017-09-26 PROBLEM — G47.30 SLEEP APNEA: Status: ACTIVE | Noted: 2017-09-26

## 2017-09-26 PROCEDURE — 99215 OFFICE O/P EST HI 40 MIN: CPT | Performed by: FAMILY MEDICINE

## 2017-09-26 PROCEDURE — 90662 IIV NO PRSV INCREASED AG IM: CPT | Performed by: FAMILY MEDICINE

## 2017-09-26 PROCEDURE — G0008 ADMIN INFLUENZA VIRUS VAC: HCPCS | Performed by: FAMILY MEDICINE

## 2017-09-26 PROCEDURE — G0439 PPPS, SUBSEQ VISIT: HCPCS | Performed by: FAMILY MEDICINE

## 2017-09-26 NOTE — PATIENT INSTRUCTIONS
Medicare Wellness  Personal Prevention Plan of Service   Please come back on 2-3 weeks for fasting labs and 6 months for an office visit.   Date of Office Visit:  2017  Encounter Provider:  Miguelito Connolly MD  Place of Service:  [facility]  Patient Name: SR Ghada Ryan  :  1935    As part of the Medicare Wellness portion of your visit today, we are providing you with this personalized preventive plan of services (PPPS). This plan is based upon recommendations of the [place] Preventive Services Task Force (USPSTF) and the Advisory Committee on Immunization Practices (ACIP).    This lists the preventive care services that should be considered, and provides dates of when you are due. Items listed as completed are up-to-date and do not require any further intervention.    Health Maintenance   Topic Date Due   • TDAP/TD VACCINES (1 - Tdap) 10/20/1954   • MEDICARE ANNUAL WELLNESS  2018   • LIPID PANEL  2018   • COLONOSCOPY  2022   • INFLUENZA VACCINE  Completed   • PNEUMOCOCCAL VACCINES (65+ LOW/MEDIUM RISK)  Completed   • ZOSTER VACCINE  Completed       Orders Placed This Encounter   Procedures   • Flu Vaccine High Dose PF 65YR+   • Ambulatory Referral to Pulmonology     Referral Priority:   Routine     Referral Type:   Consultation     Referral Reason:   Specialty Services Required     Referred to Provider:   Po Rizo MD     Requested Specialty:   Pulmonary Disease     Number of Visits Requested:   1       Return in about 6 months (around 3/26/2018) for Recheck.

## 2017-09-26 NOTE — PROGRESS NOTES
Medicare Subsequent Wellness Visit  Subjective   History of Present Illness    SR Ghada Ryan is a 81 y.o. female who presents for an Medicare Wellness Visit.She is a new patient to me today. In addition, we addressed the following health issues:  SR Urrutia has a history of chronic hypertension and has been well controlled on current medications.She is tolerating medications without side effect. She reports no vision changes, headaches or lightheadedness. She is requesting refills of medications.  She has a history of chronic hyperlipidemia and needs lab work today to evaluate response to therapy. She is tolerating medications well without side effects.  SR Urrutia has a history of chronic hypothyroidism and has been well controlled on current dose of replacement.She report no concerning symptoms: no cold intolerance, fatigue or hair loss.   She has a hx of hyperparathyroidism and is doing well. She is followed by endocrinology.  She has a hx of chronic asthma and requires multiple medications to manage.  She is here for follow-up after undergoing laparoscopic hand-assisted sigmoid colectomy for sigmoid colon perforation.  She developed a wound infection after that was treated with wet-to-dry dressing changes.She was admitted on 8/12/2017 and surgery performed on 8/13 by Dr. Munoz. She needs me to exam her abdominal wound today. She states it is still a little tender but she has no drainage of fluid, no purulence. She is not putting any ointments on this wound.     PMH, PSH, SocHx, FamHx, Allergies, and Medications: Reviewed and updated in the history section of chart.  Family History   Problem Relation Age of Onset   • Tuberculosis Mother    • Kidney cancer Father        Social History     Social History Narrative    Lives in community       Allergies   Allergen Reactions   • Contrast Dye        Outpatient Medications Prior to Visit   Medication Sig Dispense Refill   • levothyroxine (SYNTHROID, LEVOTHROID)  25 MCG tablet Take 25 mcg by mouth.     • losartan (COZAAR) 100 MG tablet TAKE 1 TABLET BY MOUTH DAILY     • meloxicam (MOBIC) 7.5 MG tablet Take 7.5 mg by mouth.     • omeprazole (priLOSEC) 20 MG capsule Take 20 mg by mouth.     • polyethylene glycol (MIRALAX) packet Take 17 g by mouth Daily. 1 each 0   • simvastatin (ZOCOR) 40 MG tablet TAKE 1 TABLET BY MOUTH NIGHTLY     • montelukast (SINGULAIR) 10 MG tablet Take 10 mg by mouth.     • neomycin-polymyxin-dexamethasone (MAXITROL) 3.5-64885-2.1 ophthalmic suspension 1 DROP IN BOTH EYES FOUR TIMES A DAY INSTILL IN EYES  3   • albuterol (PROVENTIL HFA;VENTOLIN HFA) 108 (90 BASE) MCG/ACT inhaler Inhale 2 puffs.     • albuterol (PROVENTIL) (2.5 MG/3ML) 0.083% nebulizer solution INHALE 3 MILLILITERS (2.5 MG) BY NEBULIZATION ROUTE EVERY 6 HOURS AS NEEDED     • beclomethasone (QVAR) 80 MCG/ACT inhaler INHALE 1 PUFF INTO THE LUNGS 2 (TWO) TIMES DAILY     • polyethylene glycol (MIRALAX) powder Take 17 g by mouth Daily. 850 g 2   • timolol (TIMOPTIC) 0.5 % ophthalmic solution INSTILL 1 DROP IN BOTH EYES TWICE A DAY       No facility-administered medications prior to visit.         Patient Active Problem List   Diagnosis   • Diverticulitis of large intestine with perforation and abscess without bleeding   • Diverticulitis of colon with perforation   • DJD (degenerative joint disease), ankle and foot   • Primary osteoarthritis of both knees   • GERD (gastroesophageal reflux disease)   • Sleep apnea   • Asthma   • Hypertension   • Hyperlipidemia   • Unspecified hypothyroidism   • Hypercalcemia   • Primary hyperparathyroidism   • Pulmonary nodule, right   • Tubular adenoma of colon   • Vitamin D insufficiency         Patient Care Team:  Miguelito Connolly MD as PCP - General (Family Medicine)  Health Habits:  Current Diet: Well balanced diet  Dental Exam. UTD  Eye Exam. UTD  Exercise:none  Current exercise activities include:    Recent Hospitalizations:  none    Age-appropriate  "Screening Schedule:  Refer to the list below for future screening recommendations based on patient's age. Orders for these recommended tests are listed in the plan section. The patient has been provided with a written plan.    Health Maintenance   Topic Date Due   • TDAP/TD VACCINES (1 - Tdap) 10/20/1954   • MEDICARE ANNUAL WELLNESS  09/26/2018   • LIPID PANEL  09/26/2018   • COLONOSCOPY  07/18/2022   • INFLUENZA VACCINE  Completed   • PNEUMOCOCCAL VACCINES (65+ LOW/MEDIUM RISK)  Completed   • ZOSTER VACCINE  Completed       Depression Screen:   PHQ-2/PHQ-9 Depression Screening 9/26/2017   Little interest or pleasure in doing things 0   Feeling down, depressed, or hopeless 0   Total Score 0       Functional and Cognitive Screening:  No flowsheet data found.  Does the patient have evidence of cognitive impairment? none      Review of Systems   All other systems reviewed and are negative.      Objective     Vitals:    09/26/17 1404   BP: 116/78   Pulse: 74   SpO2: 96%   Weight: 186 lb (84.4 kg)   Height: 63\" (160 cm)       Body mass index is 32.95 kg/(m^2).    PHYSICAL EXAM  Vitals reviewed and on chart.  HEENT: PERRLA, EOMI. Oral mucosa moist,   No LAD.  CV: RRR, no murmurs, rubs, clicks or gallops  LUNGS: CTA bilaterally  ABDOMEN: - 4 inch mid line scar in various stages of healing. No drainage, no purulence. Some mild redness.  EXT: No edema, FROM in bilateral upper and lower ext  NEURO: CN II - XII grossly intact        ASSESSMENT AND PLAN      Problem List Items Addressed This Visit        Cardiovascular and Mediastinum    Hypertension  Well controlled on current medication, will refill medication today and as needed. She will RTO for repeat B/P check in 6 months.    Hyperlipidemia  She will come back for fasting labs to evaluate       Respiratory    Asthma  Referred to pulmonology for consult    Relevant Orders    Ambulatory Referral to Pulmonology (Completed)       Digestive    Diverticulitis of colon with " perforation  Resolving well. Surgical wound is healing well and I have advised OTC  Bacitracin ointment twice a day until resolved      GERD (gastroesophageal reflux disease)  Well managed on Prilosec       Endocrine    Unspecified hypothyroidism    Primary hyperparathyroidism  Followed by endocrinology       Other    Primary osteoarthritis of both knees  Followed by ortho    Sleep apnea  Referred to pulmonology.      Other Visit Diagnoses     Routine general medical examination at a health care facility    -  Primary    Need for vaccination        Relevant Orders    Flu Vaccine High Dose PF 65YR+ (Completed)          Orders:  Orders Placed This Encounter   Procedures   • Flu Vaccine High Dose PF 65YR+   • Ambulatory Referral to Pulmonology     I spent over 45 minutes in face to face counseling,  managing above issues with Sister  Follow Up:  Return in about 6 months (around 3/26/2018) for Recheck.     ADVANCED DIRECTIVES: in community    An After Visit Summary and PPPS with all of these plans were given to the patient.

## 2017-09-28 DIAGNOSIS — R79.89 ABNORMAL CBC: ICD-10-CM

## 2017-09-28 DIAGNOSIS — Z79.899 HIGH RISK MEDICATION USE: ICD-10-CM

## 2017-09-28 DIAGNOSIS — E03.8 OTHER SPECIFIED ACQUIRED HYPOTHYROIDISM: Primary | ICD-10-CM

## 2017-09-28 DIAGNOSIS — N28.9 ABNORMAL KIDNEY FUNCTION: ICD-10-CM

## 2017-09-28 DIAGNOSIS — E78.2 MIXED HYPERLIPIDEMIA: ICD-10-CM

## 2017-09-29 LAB
ALBUMIN SERPL-MCNC: 3.8 G/DL (ref 3.5–5.2)
ALBUMIN/GLOB SERPL: 1.3 G/DL
ALP SERPL-CCNC: 85 U/L (ref 39–117)
ALT SERPL-CCNC: 23 U/L (ref 1–33)
AST SERPL-CCNC: 17 U/L (ref 1–32)
BILIRUB SERPL-MCNC: 0.3 MG/DL (ref 0.1–1.2)
BUN SERPL-MCNC: 17 MG/DL (ref 8–23)
BUN/CREAT SERPL: 23.3 (ref 7–25)
CALCIUM SERPL-MCNC: 10.8 MG/DL (ref 8.6–10.5)
CHLORIDE SERPL-SCNC: 101 MMOL/L (ref 98–107)
CHOLEST SERPL-MCNC: 176 MG/DL (ref 0–200)
CO2 SERPL-SCNC: 28.7 MMOL/L (ref 22–29)
CREAT SERPL-MCNC: 0.73 MG/DL (ref 0.57–1)
ERYTHROCYTE [DISTWIDTH] IN BLOOD BY AUTOMATED COUNT: 16.2 % (ref 11.7–13)
GLOBULIN SER CALC-MCNC: 3 GM/DL
GLUCOSE SERPL-MCNC: 87 MG/DL (ref 65–99)
HCT VFR BLD AUTO: 37.1 % (ref 35.6–45.5)
HDLC SERPL-MCNC: 78 MG/DL (ref 40–60)
HGB BLD-MCNC: 11.8 G/DL (ref 11.9–15.5)
LDLC SERPL CALC-MCNC: 88 MG/DL (ref 0–100)
LDLC/HDLC SERPL: 1.12 {RATIO}
MCH RBC QN AUTO: 30.3 PG (ref 26.9–32)
MCHC RBC AUTO-ENTMCNC: 31.8 G/DL (ref 32.4–36.3)
MCV RBC AUTO: 95.4 FL (ref 80.5–98.2)
PLATELET # BLD AUTO: 286 10*3/MM3 (ref 140–500)
POTASSIUM SERPL-SCNC: 4.4 MMOL/L (ref 3.5–5.2)
PROT SERPL-MCNC: 6.8 G/DL (ref 6–8.5)
RBC # BLD AUTO: 3.89 10*6/MM3 (ref 3.9–5.2)
SODIUM SERPL-SCNC: 139 MMOL/L (ref 136–145)
TRIGL SERPL-MCNC: 52 MG/DL (ref 0–150)
TSH SERPL DL<=0.005 MIU/L-ACNC: 2.44 MIU/ML (ref 0.27–4.2)
VLDLC SERPL CALC-MCNC: 10.4 MG/DL (ref 5–40)
WBC # BLD AUTO: 5.12 10*3/MM3 (ref 4.5–10.7)

## 2017-09-29 RX ORDER — TIMOLOL MALEATE 5 MG/ML
1 SOLUTION/ DROPS OPHTHALMIC 2 TIMES DAILY
COMMUNITY
End: 2019-08-15

## 2017-10-17 RX ORDER — SIMVASTATIN 40 MG
TABLET ORAL
Qty: 90 TABLET | Refills: 1 | Status: SHIPPED | OUTPATIENT
Start: 2017-10-17 | End: 2018-06-20 | Stop reason: SDUPTHER

## 2017-10-27 ENCOUNTER — OFFICE VISIT (OUTPATIENT)
Dept: FAMILY MEDICINE CLINIC | Facility: CLINIC | Age: 82
End: 2017-10-27

## 2017-10-27 VITALS
DIASTOLIC BLOOD PRESSURE: 68 MMHG | HEART RATE: 63 BPM | BODY MASS INDEX: 33.13 KG/M2 | HEIGHT: 63 IN | SYSTOLIC BLOOD PRESSURE: 120 MMHG | WEIGHT: 187 LBS | OXYGEN SATURATION: 98 %

## 2017-10-27 DIAGNOSIS — D50.9 IRON DEFICIENCY ANEMIA, UNSPECIFIED IRON DEFICIENCY ANEMIA TYPE: ICD-10-CM

## 2017-10-27 DIAGNOSIS — E83.52 HYPERCALCEMIA: Primary | ICD-10-CM

## 2017-10-27 DIAGNOSIS — E21.0 PRIMARY HYPERPARATHYROIDISM (HCC): ICD-10-CM

## 2017-10-27 PROBLEM — D64.9 ANEMIA: Status: ACTIVE | Noted: 2017-10-27

## 2017-10-27 PROCEDURE — 99213 OFFICE O/P EST LOW 20 MIN: CPT | Performed by: FAMILY MEDICINE

## 2017-10-27 NOTE — PROGRESS NOTES
Subjective   Ghada Ryan is a 82 y.o. female.     History of Present Illness   SR Ghada is here today for a follow up of her labs. She is c/o fatigue.   She has a hx of hyperparathyroidism that has been followed in the past by [facility]. Recent labs indicate that she has an elevated calcium.  She also has a long hx of anemia and she has not been taking iron as advised. She is much better but still anemic.    The following portions of the patient's history were reviewed and updated as appropriate: allergies, current medications, past medical history, past social history and problem list.    Review of Systems   Constitutional: Positive for fatigue.       Objective   Physical Exam   Constitutional: She is oriented to person, place, and time. She appears well-developed.   HENT:   Head: Normocephalic and atraumatic.   Eyes: EOM are normal. Pupils are equal, round, and reactive to light.   Cardiovascular: Normal rate.    Pulmonary/Chest: Effort normal.   Neurological: She is alert and oriented to person, place, and time.   Skin: Skin is warm and dry.   Nursing note and vitals reviewed.      Assessment/Plan   Ghada was seen today for elevated calcium and anemia.    Diagnoses and all orders for this visit:    Hypercalcemia  Primary hyperparathyroidism  Based on recent labs, I have referred her back to her endocrinologist for consult.  -     Ambulatory Referral to Endocrinology    Iron deficiency anemia, unspecified iron deficiency anemia type      Patient Instructions   Try to take an over the counter iron supplement twice a week.

## 2017-12-15 ENCOUNTER — OFFICE VISIT (OUTPATIENT)
Dept: FAMILY MEDICINE CLINIC | Facility: CLINIC | Age: 82
End: 2017-12-15

## 2017-12-15 VITALS
BODY MASS INDEX: 34.02 KG/M2 | DIASTOLIC BLOOD PRESSURE: 70 MMHG | SYSTOLIC BLOOD PRESSURE: 124 MMHG | OXYGEN SATURATION: 96 % | HEART RATE: 70 BPM | HEIGHT: 63 IN | RESPIRATION RATE: 16 BRPM | WEIGHT: 192 LBS

## 2017-12-15 DIAGNOSIS — J45.30 MILD PERSISTENT ASTHMA WITHOUT COMPLICATION: ICD-10-CM

## 2017-12-15 DIAGNOSIS — G47.30 SLEEP APNEA, UNSPECIFIED TYPE: ICD-10-CM

## 2017-12-15 DIAGNOSIS — F41.9 ANXIETY: Primary | ICD-10-CM

## 2017-12-15 DIAGNOSIS — M17.0 PRIMARY OSTEOARTHRITIS OF BOTH KNEES: ICD-10-CM

## 2017-12-15 PROCEDURE — 99214 OFFICE O/P EST MOD 30 MIN: CPT | Performed by: FAMILY MEDICINE

## 2017-12-15 RX ORDER — ASCORBIC ACID 500 MG
500 TABLET ORAL DAILY
COMMUNITY
End: 2018-08-11 | Stop reason: HOSPADM

## 2017-12-15 NOTE — PATIENT INSTRUCTIONS
Continue taking Zoloft and stop at 50 mg a day.  Take Meloxicam once a day and take Tylenol extra strength 2 tablets 2 or 3 times a day and see if that helps.

## 2017-12-15 NOTE — PROGRESS NOTES
"Subjective   Ghada Gauri Ryan is a 82 y.o. female.     History of Present Illness   Sr is here to have her lungs checked.  She states she saw her pulmonologist and he said her lungs were\"crackeling\"  She states he placed her on some medication but asked her to fup w/ her pcp.  She saw an endocrinologist  And all calcium was stopped and she will follow up for further evaluation.  She is concerned that this is why her knees are hurting so much, But, after a a discussion, she is not taking meloxicam  She is concerned about anxiety and was diagnosed with this by her last doctor.She had Zoloft  And did not take it but she decided to start. She has worked up to 50 mg and has been on this for about 3 weeks and she is tolerating well.   The following portions of the patient's history were reviewed and updated as appropriate: allergies, current medications, past family history, past medical history, past social history and problem list.    Review of Systems   HENT: Positive for ear pain.    Respiratory: Positive for wheezing.        Objective   Physical Exam   Constitutional: She is oriented to person, place, and time. She appears well-developed and well-nourished.   Cardiovascular: Normal rate and regular rhythm.    Pulmonary/Chest: Effort normal and breath sounds normal. No respiratory distress. She has no wheezes. She has no rales.   Neurological: She is alert and oriented to person, place, and time.   Psychiatric: She has a normal mood and affect. Her behavior is normal. Judgment and thought content normal.   Nursing note and vitals reviewed.      Assessment/Plan   Ghada was seen today for asthma.    Diagnoses and all orders for this visit:    Anxiety  I have encouraged her to continue to see if this helps.  -     sertraline (ZOLOFT) 50 MG tablet; Take 1 tablet by mouth Daily.    Primary osteoarthritis of both knees  I have advised meloxicam and Tylenol to see if she gets some relief.    Sleep apnea, unspecified " type  She is doing better on CPAP now.    Mild persistent asthma without complication  Her lungs are clear on current meds.       Patient Instructions   Continue taking Zoloft and stop at 50 mg a day.  Take Meloxicam once a day and take Tylenol extra strength 2 tablets 2 or 3 times a day and see if that helps.

## 2018-01-24 ENCOUNTER — OFFICE VISIT (OUTPATIENT)
Dept: SURGERY | Facility: CLINIC | Age: 83
End: 2018-01-24

## 2018-01-24 ENCOUNTER — PREP FOR SURGERY (OUTPATIENT)
Dept: OTHER | Facility: HOSPITAL | Age: 83
End: 2018-01-24

## 2018-01-24 VITALS — HEIGHT: 64 IN | BODY MASS INDEX: 33.46 KG/M2 | WEIGHT: 196 LBS | HEART RATE: 69 BPM | OXYGEN SATURATION: 95 %

## 2018-01-24 DIAGNOSIS — E21.3 HYPERPARATHYROIDISM (HCC): Primary | ICD-10-CM

## 2018-01-24 DIAGNOSIS — E21.0 PRIMARY HYPERPARATHYROIDISM (HCC): ICD-10-CM

## 2018-01-24 DIAGNOSIS — G47.30 SLEEP APNEA, UNSPECIFIED TYPE: ICD-10-CM

## 2018-01-24 DIAGNOSIS — F41.9 ANXIETY: ICD-10-CM

## 2018-01-24 DIAGNOSIS — J45.30 MILD PERSISTENT ASTHMA WITHOUT COMPLICATION: ICD-10-CM

## 2018-01-24 DIAGNOSIS — E55.9 VITAMIN D INSUFFICIENCY: ICD-10-CM

## 2018-01-24 DIAGNOSIS — R91.1 PULMONARY NODULE, RIGHT: ICD-10-CM

## 2018-01-24 DIAGNOSIS — E83.52 HYPERCALCEMIA: ICD-10-CM

## 2018-01-24 DIAGNOSIS — I10 ESSENTIAL HYPERTENSION: Primary | ICD-10-CM

## 2018-01-24 PROBLEM — K57.20 DIVERTICULITIS OF COLON WITH PERFORATION: Status: RESOLVED | Noted: 2017-08-12 | Resolved: 2018-01-24

## 2018-01-24 PROBLEM — K57.20 DIVERTICULITIS OF LARGE INTESTINE WITH PERFORATION AND ABSCESS WITHOUT BLEEDING: Status: RESOLVED | Noted: 2017-08-12 | Resolved: 2018-01-24

## 2018-01-24 PROCEDURE — 99215 OFFICE O/P EST HI 40 MIN: CPT | Performed by: SURGERY

## 2018-01-24 RX ORDER — ACETAMINOPHEN 325 MG/1
650 TABLET ORAL ONCE
Status: CANCELLED | OUTPATIENT
Start: 2018-03-15 | End: 2018-03-15

## 2018-01-24 RX ORDER — SODIUM CHLORIDE 0.9 % (FLUSH) 0.9 %
1-10 SYRINGE (ML) INJECTION AS NEEDED
Status: CANCELLED | OUTPATIENT
Start: 2018-03-15

## 2018-01-24 RX ORDER — MELOXICAM 7.5 MG/1
7.5 TABLET ORAL DAILY PRN
Status: ON HOLD
Start: 2018-01-24 | End: 2018-03-15

## 2018-01-24 RX ORDER — CEFAZOLIN SODIUM 2 G/100ML
2 INJECTION, SOLUTION INTRAVENOUS ONCE
Status: CANCELLED | OUTPATIENT
Start: 2018-03-15 | End: 2018-03-15

## 2018-01-24 NOTE — PROGRESS NOTES
SURGERY  Ghada Ryan   1935  01/24/18    Chief Complaint:  Hyperparathyroidism    HPI    Patient is a delightful 82 y.o. female who happens to be a non-in the order of [employer].  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  She comes in with diagnosis of hyperparathyroidism, having been noted on labs of 11.3 in care everywhere (which I reviewed in order to obtain all information) sees, with an intact PTH of 68.6.  She's already undergone urine studies which shows her calcium in the urine to be in the order of 299, consistent with primary hyperparathyroidism, which is usually found with a calcium over 250, familial hypocalciuric hypercalcemia with a level less than 200.    She does have vitamin D deficiency, which can contribute to hyperparathyroidism, but it is now 34 on supplementation.  She's also undergone a SPECT-CT scan at [facility] showing a suspected left lower thyroid lobe 11 mm parathyroid adenoma.    As far as symptoms she does have muscle pain in her legs, has had at least one fracture after a fall, and in 2016 had osteopenia with an increased risk for fracture.  She's had kidney stones as well and fatigue that she feels is inappropriate for her age and intolerable.  She is not having any concentration issues.    She's been to see Dr. Galeana, having seen him previously for a nosebleed.  He is here now through the referral of Adamaris Riggs.    Complicating surgery are her sleep apnea, asthma, and her Body mass index is 33.64 kg/(m^2).  She does have mild anxiety, which she relates back to the fact that she grew up during the world war, with her mother dying at age 5, her father being a prisoner of war, but she can manage this adequately.    Past Medical History:   Diagnosis Date   • Anemia    • Anxiety    • Arthritis    • Asthma    • Colon polyps    • Diverticulitis    • Diverticulosis    • GERD (gastroesophageal reflux disease)    • Glaucoma    •  Hyperlipidemia    • Hypertension    • Hypothyroidism    • Macular degeneration    • Parathyroid disease    • Sleep apnea      Past Surgical History:   Procedure Laterality Date   • BREAST LUMPECTOMY     • COLON RESECTION N/A 8/13/2017    Procedure: Laparoscopic hand assist sigmoid colectomy;  Surgeon: Cullen Munoz MD;  Location: [facility] OR;  Service:    • COLONOSCOPY N/A 7/18/2017    Diverticulosis in the sigmoid colon and in the descending colon, A single erosion in the descending colon, IH. PATH: NO INFLAMMATION, FIBROSIS, ULCERATION, OR GRANULOMA.   • COLONOSCOPY W/ POLYPECTOMY N/A approx 2013    pre cancerous polyps Munith   • WISDOM TOOTH EXTRACTION Bilateral      Family History   Problem Relation Age of Onset   • Tuberculosis Mother    • Kidney cancer Father      Social History     Social History   • Marital status: Single     Spouse name: N/A   • Number of children: N/A   • Years of education: college     Occupational History   • [employer]     Social History Main Topics   • Smoking status: Never Smoker   • Smokeless tobacco: Never Used   • Alcohol use 0.6 oz/week     1 Glasses of wine per week      Comment: rare   • Drug use: No   • Sexual activity: No     Other Topics Concern   • Not on file     Social History Narrative    Lives in community     Occupation/Additional Social Hx: None in the [employer] of the [employer], and 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 both mentally and physically      Current Outpatient Prescriptions:   •  albuterol (PROVENTIL HFA;VENTOLIN HFA) 108 (90 BASE) MCG/ACT inhaler, Inhale 2 puffs., Disp: , Rfl:   •  B Complex Vitamins (VITAMIN B COMPLEX PO), Take 1 tablet by mouth Daily., Disp: , Rfl:   •  beclomethasone (QVAR) 80 MCG/ACT inhaler, INHALE 1 PUFF INTO THE LUNGS 2 (TWO) TIMES DAILY, Disp: , Rfl:   •  Fluticasone Furoate-Vilanterol (BREO ELLIPTA) 100-25 MCG/INH aerosol powder , Inhale 1 puff Daily., Disp: , Rfl:   •  levothyroxine  "(SYNTHROID, LEVOTHROID) 25 MCG tablet, Take 25 mcg by mouth., Disp: , Rfl:   •  losartan (COZAAR) 100 MG tablet, TAKE 1 TABLET BY MOUTH DAILY, Disp: , Rfl:   •  meloxicam (MOBIC) 7.5 MG tablet, Take 1 tablet by mouth Daily As Needed., Disp: , Rfl:   •  omeprazole (priLOSEC) 20 MG capsule, Take 20 mg by mouth., Disp: , Rfl:   •  polyethylene glycol (MIRALAX) packet, Take 17 g by mouth Daily., Disp: 1 each, Rfl: 0  •  simvastatin (ZOCOR) 40 MG tablet, TAKE 1 TABLET BY MOUTH NIGHTLY, Disp: 90 tablet, Rfl: 1  •  timolol (TIMOPTIC) 0.5 % ophthalmic solution, Apply 1 drop to eye 2 (Two) Times a Day., Disp: , Rfl:   •  vitamin C (ASCORBIC ACID) 500 MG tablet, Take 500 mg by mouth Daily., Disp: , Rfl:   •  albuterol (PROVENTIL) (2.5 MG/3ML) 0.083% nebulizer solution, INHALE 3 MILLILITERS (2.5 MG) BY NEBULIZATION ROUTE EVERY 6 HOURS AS NEEDED, Disp: , Rfl:   •  sertraline (ZOLOFT) 50 MG tablet, Take 1 tablet by mouth Daily., Disp: 30 tablet, Rfl: 4    Allergies   Allergen Reactions   • Contrast Dye Hives     Preventative Medicine  Colonoscopy: 2017    Review of Systems   Respiratory: Positive for apnea. Negative for shortness of breath.    Cardiovascular: Negative for chest pain.   Genitourinary: Positive for frequency.   Allergic/Immunologic: Positive for environmental allergies.        No history of wound infections or MRSA   Hematological: Does not bruise/bleed easily.   Psychiatric/Behavioral: The patient is nervous/anxious.    All other systems reviewed and are negative.      Vitals:    01/24/18 1355   Pulse: 69   SpO2: 95%   Weight: 88.9 kg (196 lb)   Height: 162.6 cm (64\")       PHYSICAL EXAM:    Pulse 69  Ht 162.6 cm (64\")  Wt 88.9 kg (196 lb)  SpO2 95%  BMI 33.64 kg/m2  Body mass index is 33.64 kg/(m^2).    Constitutional: well developed, well nourished, Appears healthy, younger than stated age  Eyes: sclera nonicteric, conjunctiva not injected or edematous, with glasses  ENMT: Hearing intact, trachea midline, " thyroid without masses  CVS: RRR, no murmur, peripheral edema 1+, wearing Jobst hose, left tighter than right  Respiratory: CTA, normal respiratory effort   Gastrointestinal: no hepatosplenomegaly, abdomen nontender, abdominal hernia not detected  Genitourinary: inguinal hernia not detected  Musculoskeletal: gait normal, muscle mass normal  Skin: warm and dry, no rashes visible  Neurological: awake and alert, seems to have reasonable capacity for understanding for medical decision making  Psychiatric: appears to have reasonable judgement, very pleasant, sharp  Lymphatics: no cervical adenopathy     Radiographic Findings: SPECT CT scan findings as listed    Lab Findings: As listed    Pamphlet reviewed: Parathyroid adenoma    IMPRESSION:  · Primary hyperparathyroidism, with calcium 11.3, intact PTH 68.6.  Urine studies are consistent with primary hyperparathyroidism, not AT age H  · Symptoms of kidney stones, fatigue, osteopenia, with history of fracture  · Sleep apnea with CPAP use  · Asthma with inhaler use, managed by Dr. Hartley  · Anxiety on Zoloft  · Meloxicam use when necessary for arthritis, though she tells me now she is virtually quit using this as she believes it may be causing some swelling.  · Hypertension  · Vitamin D deficiency corrected on supplementation    PLAN:  · SPECT CT scan.  She would like to repeat it just to make sure there are no new findings, and in the context of the supervising/reading physician at her last study I think this is reasonable.  · She is comfortable that we've discussed the risk and benefits fully enough that she wants to go ahead and put the order in for the surgery, which I have done as a left inferior parathyroid adenoma resection with possible bilateral exploration.  We will have to modify the case if the study reveals something different  · Bring CPAP day of surgery  · Bring inhalers day of surgery  · Hold her meloxicam 1 week prior  The risk of parathyroid surgery were  "discussed with the patient including bleeding, infection, recurrent laryngeal nerve injury, hypocalcemia potentially necessitating temporary or permanent supplementation with calcium and/or activated vitamin D.  We also discussed the potential that the affected gland may not be located and hypercalcemia may remain.  Of course, scarring will be present  · We discussed the intended outpatient nature of this procedure but if the gland cannot be found in the expected location, bilateral exploration will be needed and likely overnight stay.  In this case, supplementation will be more likely and for a more extended period.    Adamaris Bhandari MD  5:04 PM  1/24/18    Addendum 02/17/18  Repeat SPECT CT shows again 2 suspected adenomas, right mid and left upper, with the right mid most convincing.  On the right, it is suspected by the early planar, delayed planar, delayed SPECT and CT (6 mm).  On the left it is seen only on the delayed SPECT and CT (7 mm triangle).  The only change is the description is that of stating the left side is superior rather than \"below\".  Will change the case request to bilateral exploration and parathyroid resection.    Adamaris Bhandari MD    "

## 2018-02-06 ENCOUNTER — OFFICE VISIT (OUTPATIENT)
Dept: ORTHOPEDIC SURGERY | Facility: CLINIC | Age: 83
End: 2018-02-06

## 2018-02-06 DIAGNOSIS — M17.0 PRIMARY OSTEOARTHRITIS OF BOTH KNEES: ICD-10-CM

## 2018-02-06 DIAGNOSIS — R52 PAIN: Primary | ICD-10-CM

## 2018-02-06 PROCEDURE — 73610 X-RAY EXAM OF ANKLE: CPT | Performed by: ORTHOPAEDIC SURGERY

## 2018-02-06 PROCEDURE — 73562 X-RAY EXAM OF KNEE 3: CPT | Performed by: ORTHOPAEDIC SURGERY

## 2018-02-06 PROCEDURE — 99213 OFFICE O/P EST LOW 20 MIN: CPT | Performed by: ORTHOPAEDIC SURGERY

## 2018-02-06 PROCEDURE — 20610 DRAIN/INJ JOINT/BURSA W/O US: CPT | Performed by: ORTHOPAEDIC SURGERY

## 2018-02-06 RX ADMIN — LIDOCAINE HYDROCHLORIDE 8 ML: 10 INJECTION, SOLUTION EPIDURAL; INFILTRATION; INTRACAUDAL; PERINEURAL at 16:21

## 2018-02-06 RX ADMIN — TRIAMCINOLONE ACETONIDE 80 MG: 40 INJECTION, SUSPENSION INTRA-ARTICULAR; INTRAMUSCULAR at 16:21

## 2018-02-06 NOTE — PROGRESS NOTES
Subjective:     Patient ID: Ghada Ryan is a 82 y.o. female.    Chief Complaint:  Follow-up bilateral knee pain, DJD  Follow-up left ankle pain  History of Present Illness  Ghada Ryan returns to clinic today for evaluation of bilateral knees in particular, she is also had some residual left ankle pain.  She did note significant improvement with prior injections of her knees, minimal improvement with left ankle injection.  Rates her current pain to her knees however as an 8-9 out of 10 in aching in nature.  She has recently been diagnosed with hyperparathyroidism and will be undergoing surgery in the next several weeks for this.  States that her knees continue to bother her with prolonged weightbearing and walking, moderate associated swelling, minimal improvement with rest and activity modification as well as anti-inflammatory medications.  She had approximately 90% relief of her pain for greater than 3 months with most recent knee injections.  Denies associated numbness or tingling bilateral lower extremities.  Denies any significant radiation of pain below level of her knees.  In regards to her left ankle she notes pain primarily over the anterolateral aspect of the ankle with prolonged walking, mild improvement with use of compression stockings.     Social History     Occupational History   • [employer]     Social History Main Topics   • Smoking status: Never Smoker   • Smokeless tobacco: Never Used   • Alcohol use 0.6 oz/week     1 Glasses of wine per week      Comment: rare   • Drug use: No   • Sexual activity: No      Past Medical History:   Diagnosis Date   • Anemia    • Anxiety    • Arthritis    • Asthma    • Colon polyps    • Diverticulitis    • Diverticulosis    • GERD (gastroesophageal reflux disease)    • Glaucoma    • Hyperlipidemia    • Hypertension    • Hypothyroidism    • Macular degeneration    • Parathyroid disease    • Sleep apnea      Past Surgical  History:   Procedure Laterality Date   • BREAST LUMPECTOMY     • COLON RESECTION N/A 8/13/2017    Procedure: Laparoscopic hand assist sigmoid colectomy;  Surgeon: Cullen Munoz MD;  Location: [facility];  Service:    • COLONOSCOPY N/A 7/18/2017    Diverticulosis in the sigmoid colon and in the descending colon, A single erosion in the descending colon, IH. PATH: NO INFLAMMATION, FIBROSIS, ULCERATION, OR GRANULOMA.   • COLONOSCOPY W/ POLYPECTOMY N/A approx 2013    pre cancerous polyps [place]   • WISDOM TOOTH EXTRACTION Bilateral        Family History   Problem Relation Age of Onset   • Tuberculosis Mother    • Kidney cancer Father          Review of Systems   Constitutional: Negative for chills, diaphoresis, fever and unexpected weight change.   HENT: Negative for hearing loss, nosebleeds, sore throat and tinnitus.    Eyes: Negative for pain and visual disturbance.   Respiratory: Negative for cough, shortness of breath and wheezing.    Cardiovascular: Negative for chest pain and palpitations.   Gastrointestinal: Negative for abdominal pain, diarrhea, nausea and vomiting.   Endocrine: Negative for cold intolerance, heat intolerance and polydipsia.   Genitourinary: Negative for difficulty urinating, dysuria and hematuria.   Musculoskeletal: Positive for arthralgias and joint swelling. Negative for myalgias.   Skin: Negative for rash and wound.   Allergic/Immunologic: Negative for environmental allergies.   Neurological: Negative for dizziness, syncope and numbness.   Hematological: Does not bruise/bleed easily.   Psychiatric/Behavioral: Negative for dysphoric mood and sleep disturbance. The patient is not nervous/anxious.    All other systems reviewed and are negative.          Objective:  There were no vitals filed for this visit.  There were no vitals filed for this visit.  There is no height or weight on file to calculate BMI.  General: No acute distress.  Resp: normal respiratory effort  Skin: no  rashes or wounds; normal turgor  Psych: mood and affect appropriate; recent and remote memory intact         Ortho Exam    Bilateral knees-active range of motion 3-125°, 4+ out of 5 strength on flexion and extension, moderate effusion bilaterally.  Maximal tenderness to palpation along medial joint line, mildly positive Beena exam with pain, no click.  Crepitus on range of motion particularly in the patellofemoral joint.  Positive active patellar compression test.  Stable to varus and valgus stress at 3 and 30°.  Left ankle-mild tenderness palpation over the anterior tibiotalar joint line, primarily over the anterolateral aspect of the ankle, mild soft tissue swelling noted in this region.  Positive talar tilt test, positive external rotation stress test, negative tib-fib compression test.      Imaging:  3 view x-rays bilateral knees from today's visit, ordered and reviewed by me, indication pain, compared to prior office x-rays.  Moderate medial and lateral patellofemoral joint space narrowing with bone-on-bone articulation particularly the lateral patellar facet bilaterally, right greater than left.  Mild medial and lateral compartment joint space narrowing on AP view noted.  No evidence radiopaque intra-articular loose body.    3 view x-rays left ankle from today's visit, ordered and reviewed by me, indication left ankle pain, compared to prior x-rays.  Mild tibiotalar joint space narrowing appreciated with flattening of talar dome noted primarily on lateral view.  No evidence of fracture, dislocation, or subluxation.    Assessment:       1. Pain    2. Primary osteoarthritis of both knees          Plan:  JUAN A query complete.          Discussed treatment options at length with patient at today's visit. In regards to bilateral knees patient would like to proceed with repeat intra-articular injection given prior success with cortisone injections.  We also discussed options for viscous supplement injections or  total knee arthroplasty the patient wants to hold off on surgery at this time.  In regards to her ankle we will proceed with immobilization and stabilization with compression stockings as well as Ace wrap as needed, consideration for ankle sleeve.  Minimal improvement with prior injection so we will not repeat that currently.  She has continued issues with her ankle I would recommend follow-up with the foot and ankle specialist.    Ghada Ryan was in agreement with plan and had all questions answered.     Orders:  Orders Placed This Encounter   Procedures   • Large Joint Arthrocentesis   • XR Ankle 3+ View Left   • XR Knee 3 View Bilateral       Medications:  No orders of the defined types were placed in this encounter.      Followup:  Return in about 3 months (around 5/6/2018).    Ghada was seen today for follow-up, pain, edema, follow-up, pain, edema and pain.    Diagnoses and all orders for this visit:    Pain  -     XR Ankle 3+ View Left  -     XR Knee 3 View Bilateral  -     Large Joint Arthrocentesis    Primary osteoarthritis of both knees          Dictated utilizing Dragon dictation   Large Joint Arthrocentesis  Date/Time: 2/6/2018 4:21 PM  Consent given by: patient  Site marked: site marked  Timeout: Immediately prior to procedure a time out was called to verify the correct patient, procedure, equipment, support staff and site/side marked as required   Supporting Documentation  Indications: pain   Procedure Details  Location: knee - Knee joint: bilateral.  Preparation: Patient was prepped and draped in the usual sterile fashion  Needle size: 22 G  Approach: anterolateral  Medications administered: 8 mL lidocaine PF 1% 1 %; 80 mg triamcinolone acetonide 40 MG/ML  Patient tolerance: patient tolerated the procedure well with no immediate complications

## 2018-02-12 RX ORDER — TRIAMCINOLONE ACETONIDE 40 MG/ML
80 INJECTION, SUSPENSION INTRA-ARTICULAR; INTRAMUSCULAR
Status: COMPLETED | OUTPATIENT
Start: 2018-02-06 | End: 2018-02-06

## 2018-02-12 RX ORDER — LIDOCAINE HYDROCHLORIDE 10 MG/ML
8 INJECTION, SOLUTION EPIDURAL; INFILTRATION; INTRACAUDAL; PERINEURAL
Status: COMPLETED | OUTPATIENT
Start: 2018-02-06 | End: 2018-02-06

## 2018-02-13 DIAGNOSIS — E21.0 PRIMARY HYPERPARATHYROIDISM (HCC): ICD-10-CM

## 2018-02-13 DIAGNOSIS — E83.52 HYPERCALCEMIA: ICD-10-CM

## 2018-02-16 ENCOUNTER — TELEPHONE (OUTPATIENT)
Dept: SURGERY | Facility: CLINIC | Age: 83
End: 2018-02-16

## 2018-02-16 NOTE — TELEPHONE ENCOUNTER
Patient would like to know if you want her to be seen in the office following her Sestamibi Scan?    Addend  I called her.  She doesn't want to come in again before surgery.  We will call and schedule.  Adamaris Bhandari MD

## 2018-02-17 ENCOUNTER — PREP FOR SURGERY (OUTPATIENT)
Dept: OTHER | Facility: HOSPITAL | Age: 83
End: 2018-02-17

## 2018-02-21 PROBLEM — E21.3 HYPERPARATHYROIDISM (HCC): Status: ACTIVE | Noted: 2018-02-21

## 2018-03-02 DIAGNOSIS — F41.9 ANXIETY: ICD-10-CM

## 2018-03-15 ENCOUNTER — ANESTHESIA EVENT (OUTPATIENT)
Dept: PERIOP | Facility: HOSPITAL | Age: 83
End: 2018-03-15

## 2018-03-15 ENCOUNTER — ANESTHESIA (OUTPATIENT)
Dept: PERIOP | Facility: HOSPITAL | Age: 83
End: 2018-03-15

## 2018-03-15 ENCOUNTER — HOSPITAL ENCOUNTER (OUTPATIENT)
Facility: HOSPITAL | Age: 83
Discharge: HOME | End: 2018-03-16
Attending: SURGERY | Admitting: SURGERY

## 2018-03-15 DIAGNOSIS — E21.3 HYPERPARATHYROIDISM (HCC): ICD-10-CM

## 2018-03-15 LAB
CALCIUM SPEC-SCNC: 9.2 MG/DL (ref 8.6–10.5)
PTH-INTACT SERPL-SCNC: 12.7 PG/ML (ref 15–65)
PTH-INTACT SERPL-SCNC: 23.1 PG/ML (ref 15–65)
PTH-INTACT SERPL-SCNC: 74.7 PG/ML (ref 15–65)

## 2018-03-15 PROCEDURE — 25010000002 ONDANSETRON PER 1 MG: Performed by: NURSE ANESTHETIST, CERTIFIED REGISTERED

## 2018-03-15 PROCEDURE — 93005 ELECTROCARDIOGRAM TRACING: CPT | Performed by: ANESTHESIOLOGY

## 2018-03-15 PROCEDURE — 25010000002 HYDROMORPHONE PER 4 MG: Performed by: NURSE ANESTHETIST, CERTIFIED REGISTERED

## 2018-03-15 PROCEDURE — 25010000002 FENTANYL CITRATE (PF) 100 MCG/2ML SOLUTION: Performed by: NURSE ANESTHETIST, CERTIFIED REGISTERED

## 2018-03-15 PROCEDURE — 63710000001 CALCIUM-VITAMIN D 500-200 MG-UNIT TABLET: Performed by: SURGERY

## 2018-03-15 PROCEDURE — 25010000003 CEFAZOLIN IN DEXTROSE 2-4 GM/100ML-% SOLUTION: Performed by: SURGERY

## 2018-03-15 PROCEDURE — A9270 NON-COVERED ITEM OR SERVICE: HCPCS | Performed by: SURGERY

## 2018-03-15 PROCEDURE — 25010000002 MIDAZOLAM PER 1 MG: Performed by: ANESTHESIOLOGY

## 2018-03-15 PROCEDURE — 94799 UNLISTED PULMONARY SVC/PX: CPT

## 2018-03-15 PROCEDURE — G0378 HOSPITAL OBSERVATION PER HR: HCPCS

## 2018-03-15 PROCEDURE — 93010 ELECTROCARDIOGRAM REPORT: CPT | Performed by: INTERNAL MEDICINE

## 2018-03-15 PROCEDURE — 25010000002 DEXAMETHASONE PER 1 MG: Performed by: NURSE ANESTHETIST, CERTIFIED REGISTERED

## 2018-03-15 PROCEDURE — S0260 H&P FOR SURGERY: HCPCS | Performed by: SURGERY

## 2018-03-15 PROCEDURE — 25010000002 HYDROMORPHONE PER 4 MG: Performed by: SURGERY

## 2018-03-15 PROCEDURE — 88305 TISSUE EXAM BY PATHOLOGIST: CPT | Performed by: SURGERY

## 2018-03-15 PROCEDURE — 60500 EXPLORE PARATHYROID GLANDS: CPT | Performed by: SURGERY

## 2018-03-15 PROCEDURE — 63710000001 TIMOLOL 0.5 % SOLUTION 5 ML BOTTLE: Performed by: SURGERY

## 2018-03-15 PROCEDURE — 63710000001 LOSARTAN 100 MG TABLET: Performed by: SURGERY

## 2018-03-15 PROCEDURE — 63710000001 SERTRALINE 50 MG TABLET: Performed by: SURGERY

## 2018-03-15 PROCEDURE — 88331 PATH CONSLTJ SURG 1 BLK 1SPC: CPT | Performed by: SURGERY

## 2018-03-15 PROCEDURE — 63710000001 CALCITRIOL 0.25 MCG CAPSULE: Performed by: SURGERY

## 2018-03-15 PROCEDURE — 83970 ASSAY OF PARATHORMONE: CPT | Performed by: SURGERY

## 2018-03-15 PROCEDURE — 94640 AIRWAY INHALATION TREATMENT: CPT

## 2018-03-15 PROCEDURE — 82310 ASSAY OF CALCIUM: CPT | Performed by: SURGERY

## 2018-03-15 PROCEDURE — 25010000002 PROPOFOL 10 MG/ML EMULSION: Performed by: NURSE ANESTHETIST, CERTIFIED REGISTERED

## 2018-03-15 RX ORDER — ATORVASTATIN CALCIUM 20 MG/1
20 TABLET, FILM COATED ORAL DAILY
Status: DISCONTINUED | OUTPATIENT
Start: 2018-03-15 | End: 2018-03-16

## 2018-03-15 RX ORDER — DIPHENHYDRAMINE HYDROCHLORIDE 50 MG/ML
12.5 INJECTION INTRAMUSCULAR; INTRAVENOUS
Status: DISCONTINUED | OUTPATIENT
Start: 2018-03-15 | End: 2018-03-15 | Stop reason: HOSPADM

## 2018-03-15 RX ORDER — NALOXONE HCL 0.4 MG/ML
0.4 VIAL (ML) INJECTION
Status: DISCONTINUED | OUTPATIENT
Start: 2018-03-15 | End: 2018-03-16 | Stop reason: HOSPADM

## 2018-03-15 RX ORDER — ONDANSETRON 4 MG/1
4 TABLET, FILM COATED ORAL EVERY 6 HOURS PRN
Status: DISCONTINUED | OUTPATIENT
Start: 2018-03-15 | End: 2018-03-16 | Stop reason: HOSPADM

## 2018-03-15 RX ORDER — MAGNESIUM HYDROXIDE 1200 MG/15ML
LIQUID ORAL AS NEEDED
Status: DISCONTINUED | OUTPATIENT
Start: 2018-03-15 | End: 2018-03-15 | Stop reason: HOSPADM

## 2018-03-15 RX ORDER — ALBUTEROL SULFATE 2.5 MG/3ML
2.5 SOLUTION RESPIRATORY (INHALATION) EVERY 6 HOURS PRN
Status: DISCONTINUED | OUTPATIENT
Start: 2018-03-15 | End: 2018-03-16 | Stop reason: HOSPADM

## 2018-03-15 RX ORDER — FENTANYL CITRATE 50 UG/ML
INJECTION, SOLUTION INTRAMUSCULAR; INTRAVENOUS AS NEEDED
Status: DISCONTINUED | OUTPATIENT
Start: 2018-03-15 | End: 2018-03-15 | Stop reason: SURG

## 2018-03-15 RX ORDER — EPHEDRINE SULFATE 50 MG/ML
5 INJECTION, SOLUTION INTRAVENOUS ONCE AS NEEDED
Status: DISCONTINUED | OUTPATIENT
Start: 2018-03-15 | End: 2018-03-15 | Stop reason: HOSPADM

## 2018-03-15 RX ORDER — MIDAZOLAM HYDROCHLORIDE 1 MG/ML
2 INJECTION INTRAMUSCULAR; INTRAVENOUS
Status: DISCONTINUED | OUTPATIENT
Start: 2018-03-15 | End: 2018-03-15 | Stop reason: HOSPADM

## 2018-03-15 RX ORDER — ONDANSETRON 2 MG/ML
4 INJECTION INTRAMUSCULAR; INTRAVENOUS ONCE AS NEEDED
Status: DISCONTINUED | OUTPATIENT
Start: 2018-03-15 | End: 2018-03-15 | Stop reason: HOSPADM

## 2018-03-15 RX ORDER — LABETALOL HYDROCHLORIDE 5 MG/ML
5 INJECTION, SOLUTION INTRAVENOUS
Status: DISCONTINUED | OUTPATIENT
Start: 2018-03-15 | End: 2018-03-15 | Stop reason: HOSPADM

## 2018-03-15 RX ORDER — HYDROMORPHONE HYDROCHLORIDE 1 MG/ML
0.5 INJECTION, SOLUTION INTRAMUSCULAR; INTRAVENOUS; SUBCUTANEOUS
Status: DISCONTINUED | OUTPATIENT
Start: 2018-03-15 | End: 2018-03-16 | Stop reason: HOSPADM

## 2018-03-15 RX ORDER — OXYCODONE HYDROCHLORIDE AND ACETAMINOPHEN 5; 325 MG/1; MG/1
1 TABLET ORAL ONCE AS NEEDED
Status: DISCONTINUED | OUTPATIENT
Start: 2018-03-15 | End: 2018-03-15 | Stop reason: HOSPADM

## 2018-03-15 RX ORDER — ACETAMINOPHEN 325 MG/1
650 TABLET ORAL ONCE
Status: COMPLETED | OUTPATIENT
Start: 2018-03-15 | End: 2018-03-15

## 2018-03-15 RX ORDER — HYDROCODONE BITARTRATE AND ACETAMINOPHEN 7.5; 325 MG/1; MG/1
1 TABLET ORAL ONCE AS NEEDED
Status: DISCONTINUED | OUTPATIENT
Start: 2018-03-15 | End: 2018-03-15 | Stop reason: HOSPADM

## 2018-03-15 RX ORDER — SODIUM CHLORIDE 450 MG/100ML
100 INJECTION, SOLUTION INTRAVENOUS CONTINUOUS
Status: DISCONTINUED | OUTPATIENT
Start: 2018-03-15 | End: 2018-03-16 | Stop reason: HOSPADM

## 2018-03-15 RX ORDER — EPHEDRINE SULFATE 50 MG/ML
INJECTION, SOLUTION INTRAVENOUS AS NEEDED
Status: DISCONTINUED | OUTPATIENT
Start: 2018-03-15 | End: 2018-03-15 | Stop reason: SURG

## 2018-03-15 RX ORDER — PROMETHAZINE HYDROCHLORIDE 25 MG/1
12.5 TABLET ORAL ONCE AS NEEDED
Status: DISCONTINUED | OUTPATIENT
Start: 2018-03-15 | End: 2018-03-15 | Stop reason: HOSPADM

## 2018-03-15 RX ORDER — OXYCODONE HYDROCHLORIDE AND ACETAMINOPHEN 5; 325 MG/1; MG/1
1 TABLET ORAL EVERY 4 HOURS PRN
Status: DISCONTINUED | OUTPATIENT
Start: 2018-03-15 | End: 2018-03-16 | Stop reason: HOSPADM

## 2018-03-15 RX ORDER — BUDESONIDE AND FORMOTEROL FUMARATE DIHYDRATE 80; 4.5 UG/1; UG/1
2 AEROSOL RESPIRATORY (INHALATION)
Status: DISCONTINUED | OUTPATIENT
Start: 2018-03-15 | End: 2018-03-16 | Stop reason: HOSPADM

## 2018-03-15 RX ORDER — HYDROCODONE BITARTRATE AND ACETAMINOPHEN 5; 325 MG/1; MG/1
TABLET ORAL
Qty: 20 TABLET | Refills: 0 | Status: SHIPPED | OUTPATIENT
Start: 2018-03-15 | End: 2018-03-26

## 2018-03-15 RX ORDER — ALBUTEROL SULFATE 90 UG/1
2 AEROSOL, METERED RESPIRATORY (INHALATION) EVERY 4 HOURS PRN
Status: DISCONTINUED | OUTPATIENT
Start: 2018-03-15 | End: 2018-03-15 | Stop reason: SDUPTHER

## 2018-03-15 RX ORDER — FENTANYL CITRATE 50 UG/ML
50 INJECTION, SOLUTION INTRAMUSCULAR; INTRAVENOUS
Status: DISCONTINUED | OUTPATIENT
Start: 2018-03-15 | End: 2018-03-15 | Stop reason: HOSPADM

## 2018-03-15 RX ORDER — CALCITRIOL 0.25 UG/1
0.25 CAPSULE, LIQUID FILLED ORAL DAILY
Qty: 30 CAPSULE | Refills: 0 | Status: SHIPPED | OUTPATIENT
Start: 2018-03-15 | End: 2018-03-20

## 2018-03-15 RX ORDER — ONDANSETRON 2 MG/ML
4 INJECTION INTRAMUSCULAR; INTRAVENOUS EVERY 6 HOURS PRN
Status: DISCONTINUED | OUTPATIENT
Start: 2018-03-15 | End: 2018-03-16 | Stop reason: HOSPADM

## 2018-03-15 RX ORDER — ROCURONIUM BROMIDE 10 MG/ML
INJECTION, SOLUTION INTRAVENOUS AS NEEDED
Status: DISCONTINUED | OUTPATIENT
Start: 2018-03-15 | End: 2018-03-15 | Stop reason: SURG

## 2018-03-15 RX ORDER — LOSARTAN POTASSIUM 100 MG/1
100 TABLET ORAL DAILY
Status: DISCONTINUED | OUTPATIENT
Start: 2018-03-15 | End: 2018-03-16 | Stop reason: HOSPADM

## 2018-03-15 RX ORDER — OXYCODONE AND ACETAMINOPHEN 7.5; 325 MG/1; MG/1
1 TABLET ORAL ONCE AS NEEDED
Status: DISCONTINUED | OUTPATIENT
Start: 2018-03-15 | End: 2018-03-15 | Stop reason: HOSPADM

## 2018-03-15 RX ORDER — CEFAZOLIN SODIUM 2 G/100ML
2 INJECTION, SOLUTION INTRAVENOUS ONCE
Status: COMPLETED | OUTPATIENT
Start: 2018-03-15 | End: 2018-03-15

## 2018-03-15 RX ORDER — LIDOCAINE HYDROCHLORIDE 20 MG/ML
INJECTION, SOLUTION INFILTRATION; PERINEURAL AS NEEDED
Status: DISCONTINUED | OUTPATIENT
Start: 2018-03-15 | End: 2018-03-15 | Stop reason: SURG

## 2018-03-15 RX ORDER — ONDANSETRON 4 MG/1
4 TABLET, ORALLY DISINTEGRATING ORAL EVERY 6 HOURS PRN
Status: DISCONTINUED | OUTPATIENT
Start: 2018-03-15 | End: 2018-03-16 | Stop reason: HOSPADM

## 2018-03-15 RX ORDER — BUPIVACAINE HYDROCHLORIDE AND EPINEPHRINE 5; 5 MG/ML; UG/ML
INJECTION, SOLUTION PERINEURAL AS NEEDED
Status: DISCONTINUED | OUTPATIENT
Start: 2018-03-15 | End: 2018-03-15 | Stop reason: HOSPADM

## 2018-03-15 RX ORDER — NALOXONE HCL 0.4 MG/ML
0.2 VIAL (ML) INJECTION AS NEEDED
Status: DISCONTINUED | OUTPATIENT
Start: 2018-03-15 | End: 2018-03-15 | Stop reason: HOSPADM

## 2018-03-15 RX ORDER — CALCITRIOL 0.25 UG/1
0.25 CAPSULE, LIQUID FILLED ORAL DAILY
Status: DISCONTINUED | OUTPATIENT
Start: 2018-03-15 | End: 2018-03-16 | Stop reason: HOSPADM

## 2018-03-15 RX ORDER — PROPOFOL 10 MG/ML
VIAL (ML) INTRAVENOUS AS NEEDED
Status: DISCONTINUED | OUTPATIENT
Start: 2018-03-15 | End: 2018-03-15 | Stop reason: SURG

## 2018-03-15 RX ORDER — SODIUM CHLORIDE, SODIUM LACTATE, POTASSIUM CHLORIDE, CALCIUM CHLORIDE 600; 310; 30; 20 MG/100ML; MG/100ML; MG/100ML; MG/100ML
9 INJECTION, SOLUTION INTRAVENOUS CONTINUOUS
Status: DISCONTINUED | OUTPATIENT
Start: 2018-03-15 | End: 2018-03-15

## 2018-03-15 RX ORDER — FLUMAZENIL 0.1 MG/ML
0.2 INJECTION INTRAVENOUS AS NEEDED
Status: DISCONTINUED | OUTPATIENT
Start: 2018-03-15 | End: 2018-03-15 | Stop reason: HOSPADM

## 2018-03-15 RX ORDER — PROMETHAZINE HYDROCHLORIDE 25 MG/1
25 SUPPOSITORY RECTAL ONCE AS NEEDED
Status: DISCONTINUED | OUTPATIENT
Start: 2018-03-15 | End: 2018-03-15 | Stop reason: HOSPADM

## 2018-03-15 RX ORDER — LIDOCAINE HYDROCHLORIDE 10 MG/ML
0.5 INJECTION, SOLUTION EPIDURAL; INFILTRATION; INTRACAUDAL; PERINEURAL ONCE AS NEEDED
Status: COMPLETED | OUTPATIENT
Start: 2018-03-15 | End: 2018-03-15

## 2018-03-15 RX ORDER — LEVOTHYROXINE SODIUM 0.03 MG/1
25 TABLET ORAL
Status: DISCONTINUED | OUTPATIENT
Start: 2018-03-16 | End: 2018-03-16 | Stop reason: HOSPADM

## 2018-03-15 RX ORDER — DEXAMETHASONE SODIUM PHOSPHATE 4 MG/ML
INJECTION, SOLUTION INTRA-ARTICULAR; INTRALESIONAL; INTRAMUSCULAR; INTRAVENOUS; SOFT TISSUE AS NEEDED
Status: DISCONTINUED | OUTPATIENT
Start: 2018-03-15 | End: 2018-03-15 | Stop reason: SURG

## 2018-03-15 RX ORDER — MELOXICAM 7.5 MG/1
7.5 TABLET ORAL DAILY PRN
Start: 2018-03-20 | End: 2018-04-19 | Stop reason: DRUGHIGH

## 2018-03-15 RX ORDER — SODIUM CHLORIDE 0.9 % (FLUSH) 0.9 %
1-10 SYRINGE (ML) INJECTION AS NEEDED
Status: DISCONTINUED | OUTPATIENT
Start: 2018-03-15 | End: 2018-03-15 | Stop reason: HOSPADM

## 2018-03-15 RX ORDER — HYDRALAZINE HYDROCHLORIDE 20 MG/ML
5 INJECTION INTRAMUSCULAR; INTRAVENOUS
Status: DISCONTINUED | OUTPATIENT
Start: 2018-03-15 | End: 2018-03-15 | Stop reason: HOSPADM

## 2018-03-15 RX ORDER — PROMETHAZINE HYDROCHLORIDE 25 MG/ML
12.5 INJECTION, SOLUTION INTRAMUSCULAR; INTRAVENOUS ONCE AS NEEDED
Status: DISCONTINUED | OUTPATIENT
Start: 2018-03-15 | End: 2018-03-15 | Stop reason: HOSPADM

## 2018-03-15 RX ORDER — FAMOTIDINE 10 MG/ML
20 INJECTION, SOLUTION INTRAVENOUS ONCE
Status: COMPLETED | OUTPATIENT
Start: 2018-03-15 | End: 2018-03-15

## 2018-03-15 RX ORDER — PROMETHAZINE HYDROCHLORIDE 25 MG/1
25 TABLET ORAL ONCE AS NEEDED
Status: DISCONTINUED | OUTPATIENT
Start: 2018-03-15 | End: 2018-03-15 | Stop reason: HOSPADM

## 2018-03-15 RX ORDER — TIMOLOL MALEATE 5 MG/ML
1 SOLUTION/ DROPS OPHTHALMIC EVERY 12 HOURS SCHEDULED
Status: DISCONTINUED | OUTPATIENT
Start: 2018-03-15 | End: 2018-03-16 | Stop reason: HOSPADM

## 2018-03-15 RX ORDER — ONDANSETRON 2 MG/ML
INJECTION INTRAMUSCULAR; INTRAVENOUS AS NEEDED
Status: DISCONTINUED | OUTPATIENT
Start: 2018-03-15 | End: 2018-03-15 | Stop reason: SURG

## 2018-03-15 RX ORDER — NALOXONE HCL 0.4 MG/ML
0.1 VIAL (ML) INJECTION
Status: DISCONTINUED | OUTPATIENT
Start: 2018-03-15 | End: 2018-03-16 | Stop reason: HOSPADM

## 2018-03-15 RX ORDER — IPRATROPIUM BROMIDE AND ALBUTEROL SULFATE 2.5; .5 MG/3ML; MG/3ML
3 SOLUTION RESPIRATORY (INHALATION) ONCE
Status: COMPLETED | OUTPATIENT
Start: 2018-03-15 | End: 2018-03-15

## 2018-03-15 RX ORDER — POLYETHYLENE GLYCOL 3350 17 G/17G
17 POWDER, FOR SOLUTION ORAL DAILY
Status: DISCONTINUED | OUTPATIENT
Start: 2018-03-15 | End: 2018-03-16 | Stop reason: HOSPADM

## 2018-03-15 RX ORDER — ONDANSETRON 4 MG/1
4 TABLET, FILM COATED ORAL EVERY 8 HOURS PRN
Qty: 20 TABLET | Refills: 0 | Status: SHIPPED | OUTPATIENT
Start: 2018-03-15 | End: 2018-03-26

## 2018-03-15 RX ORDER — MORPHINE SULFATE 2 MG/ML
4 INJECTION, SOLUTION INTRAMUSCULAR; INTRAVENOUS
Status: DISCONTINUED | OUTPATIENT
Start: 2018-03-15 | End: 2018-03-16 | Stop reason: HOSPADM

## 2018-03-15 RX ORDER — MIDAZOLAM HYDROCHLORIDE 1 MG/ML
1 INJECTION INTRAMUSCULAR; INTRAVENOUS
Status: DISCONTINUED | OUTPATIENT
Start: 2018-03-15 | End: 2018-03-15 | Stop reason: HOSPADM

## 2018-03-15 RX ADMIN — FAMOTIDINE 20 MG: 10 INJECTION INTRAVENOUS at 08:32

## 2018-03-15 RX ADMIN — ONDANSETRON 4 MG: 2 INJECTION INTRAMUSCULAR; INTRAVENOUS at 10:09

## 2018-03-15 RX ADMIN — SODIUM CHLORIDE, POTASSIUM CHLORIDE, SODIUM LACTATE AND CALCIUM CHLORIDE 9 ML/HR: 600; 310; 30; 20 INJECTION, SOLUTION INTRAVENOUS at 08:05

## 2018-03-15 RX ADMIN — CALCITRIOL 0.25 MCG: 0.25 CAPSULE, LIQUID FILLED ORAL at 17:27

## 2018-03-15 RX ADMIN — ROCURONIUM BROMIDE 50 MG: 10 INJECTION INTRAVENOUS at 08:43

## 2018-03-15 RX ADMIN — IPRATROPIUM BROMIDE AND ALBUTEROL SULFATE 3 ML: .5; 3 SOLUTION RESPIRATORY (INHALATION) at 08:01

## 2018-03-15 RX ADMIN — HYDROMORPHONE HYDROCHLORIDE 0.5 MG: 2 INJECTION INTRAMUSCULAR; INTRAVENOUS; SUBCUTANEOUS at 11:33

## 2018-03-15 RX ADMIN — CEFAZOLIN SODIUM 2 G: 2 INJECTION, SOLUTION INTRAVENOUS at 08:43

## 2018-03-15 RX ADMIN — TIMOLOL MALEATE 1 DROP: 5 SOLUTION/ DROPS OPHTHALMIC at 23:38

## 2018-03-15 RX ADMIN — SODIUM CHLORIDE 100 ML/HR: 4.5 INJECTION, SOLUTION INTRAVENOUS at 14:08

## 2018-03-15 RX ADMIN — FENTANYL CITRATE 50 MCG: 50 INJECTION, SOLUTION INTRAMUSCULAR; INTRAVENOUS at 11:14

## 2018-03-15 RX ADMIN — ACETAMINOPHEN 650 MG: 325 TABLET ORAL at 08:06

## 2018-03-15 RX ADMIN — HYDROMORPHONE HYDROCHLORIDE 0.5 MG: 10 INJECTION INTRAMUSCULAR; INTRAVENOUS; SUBCUTANEOUS at 14:41

## 2018-03-15 RX ADMIN — PROPOFOL 150 MG: 10 INJECTION, EMULSION INTRAVENOUS at 08:43

## 2018-03-15 RX ADMIN — EPHEDRINE SULFATE 10 MG: 50 INJECTION INTRAMUSCULAR; INTRAVENOUS; SUBCUTANEOUS at 08:55

## 2018-03-15 RX ADMIN — SUGAMMADEX 200 MG: 100 INJECTION, SOLUTION INTRAVENOUS at 10:25

## 2018-03-15 RX ADMIN — EPHEDRINE SULFATE 10 MG: 50 INJECTION INTRAMUSCULAR; INTRAVENOUS; SUBCUTANEOUS at 09:58

## 2018-03-15 RX ADMIN — CALCIUM CARBONATE-VITAMIN D TAB 500 MG-200 UNIT 1000 MG: 500-200 TAB at 17:28

## 2018-03-15 RX ADMIN — LOSARTAN POTASSIUM 100 MG: 100 TABLET ORAL at 17:21

## 2018-03-15 RX ADMIN — FENTANYL CITRATE 50 MCG: 50 INJECTION INTRAMUSCULAR; INTRAVENOUS at 08:43

## 2018-03-15 RX ADMIN — DEXAMETHASONE SODIUM PHOSPHATE 10 MG: 4 INJECTION, SOLUTION INTRAMUSCULAR; INTRAVENOUS at 08:55

## 2018-03-15 RX ADMIN — SODIUM CHLORIDE, POTASSIUM CHLORIDE, SODIUM LACTATE AND CALCIUM CHLORIDE: 600; 310; 30; 20 INJECTION, SOLUTION INTRAVENOUS at 09:46

## 2018-03-15 RX ADMIN — LIDOCAINE HYDROCHLORIDE 0.5 ML: 10 INJECTION, SOLUTION EPIDURAL; INFILTRATION; INTRACAUDAL; PERINEURAL at 08:32

## 2018-03-15 RX ADMIN — EPHEDRINE SULFATE 10 MG: 50 INJECTION INTRAMUSCULAR; INTRAVENOUS; SUBCUTANEOUS at 09:42

## 2018-03-15 RX ADMIN — SODIUM CHLORIDE, POTASSIUM CHLORIDE, SODIUM LACTATE AND CALCIUM CHLORIDE 9 ML/HR: 600; 310; 30; 20 INJECTION, SOLUTION INTRAVENOUS at 08:32

## 2018-03-15 RX ADMIN — FENTANYL CITRATE 50 MCG: 50 INJECTION, SOLUTION INTRAMUSCULAR; INTRAVENOUS at 11:00

## 2018-03-15 RX ADMIN — CALCIUM CARBONATE-VITAMIN D TAB 500 MG-200 UNIT 1000 MG: 500-200 TAB at 23:38

## 2018-03-15 RX ADMIN — MIDAZOLAM 1 MG: 1 INJECTION INTRAMUSCULAR; INTRAVENOUS at 08:32

## 2018-03-15 RX ADMIN — SERTRALINE 50 MG: 50 TABLET, FILM COATED ORAL at 23:38

## 2018-03-15 RX ADMIN — LIDOCAINE HYDROCHLORIDE 50 MG: 20 INJECTION, SOLUTION INFILTRATION; PERINEURAL at 08:43

## 2018-03-15 NOTE — H&P
Chief Complaint:  Hyperparathyroidism     HPI    Patient here for parathyroid surgery.       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  She comes in with diagnosis of hyperparathyroidism, having been noted on labs of 11.3 in care everywhere (which I reviewed in order to obtain all information) sees, with an intact PTH of 68.6.  She's already undergone urine studies which shows her calcium in the urine to be in the order of 299, consistent with primary hyperparathyroidism, which is usually found with a calcium over 250, familial hypocalciuric hypercalcemia with a level less than 200.     She does have vitamin D deficiency, which can contribute to hyperparathyroidism, but it is now 34 on supplementation.  She's also undergone a SPECT-CT scan at [facility] showing a suspected left lower thyroid lobe 11 mm parathyroid adenoma.     As far as symptoms she does have muscle pain in her legs, has had at least one fracture after a fall, and in 2016 had osteopenia with an increased risk for fracture.  She's had kidney stones as well and fatigue that she feels is inappropriate for her age and intolerable.  She is not having any concentration issues.     She's been to see Dr. Galeana, having seen him previously for a nosebleed.  He is here now through the referral of Adamaris Riggs.     Complicating surgery are her sleep apnea, asthma, and her Body mass index is 33.64 kg/(m^2).  She does have mild anxiety, which she relates back to the fact that she grew up during the world war, with her mother dying at age 5, her father being a prisoner of war, but she can manage this adequately.     Medical History        Past Medical History:   Diagnosis Date   • Anemia     • Anxiety     • Arthritis     • Asthma     • Colon polyps     • Diverticulitis     • Diverticulosis     • GERD (gastroesophageal reflux disease)     •  Glaucoma     • Hyperlipidemia     • Hypertension     • Hypothyroidism     • Macular degeneration     • Parathyroid disease     • Sleep apnea           Surgical History         Past Surgical History:   Procedure Laterality Date   • BREAST LUMPECTOMY       • COLON RESECTION N/A 8/13/2017     Procedure: Laparoscopic hand assist sigmoid colectomy;  Surgeon: Cullen Munoz MD;  Location: [facility];  Service:    • COLONOSCOPY N/A 7/18/2017     Diverticulosis in the sigmoid colon and in the descending colon, A single erosion in the descending colon, IH. PATH: NO INFLAMMATION, FIBROSIS, ULCERATION, OR GRANULOMA.   • COLONOSCOPY W/ POLYPECTOMY N/A approx 2013     pre cancerous polyps [place]   • WISDOM TOOTH EXTRACTION Bilateral                 Family History   Problem Relation Age of Onset   • Tuberculosis Mother     • Kidney cancer Father        Social History   Social History            Social History   • Marital status: Single       Spouse name: N/A   • Number of children: N/A   • Years of education: college           Occupational History   • [employer]              Social History Main Topics    • Smoking status: Never Smoker    • Smokeless tobacco: Never Used    • Alcohol use 0.6 oz/week       1 Glasses of wine per week         Comment: rare    • Drug use: No    • Sexual activity: No            Other Topics Concern   • Not on file          Social History Narrative     Lives in community         Occupation/Additional Social Hx: None in the order of the [employer], and 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 both mentally and physically        Current Outpatient Prescriptions:   •  albuterol (PROVENTIL HFA;VENTOLIN HFA) 108 (90 BASE) MCG/ACT inhaler, Inhale 2 puffs., Disp: , Rfl:   •  B Complex Vitamins (VITAMIN B COMPLEX PO), Take 1 tablet by mouth Daily., Disp: , Rfl:   •  beclomethasone (QVAR) 80 MCG/ACT inhaler, INHALE 1 PUFF INTO THE LUNGS 2 (TWO) TIMES DAILY, Disp: , Rfl:  "  •  Fluticasone Furoate-Vilanterol (BREO ELLIPTA) 100-25 MCG/INH aerosol powder , Inhale 1 puff Daily., Disp: , Rfl:   •  levothyroxine (SYNTHROID, LEVOTHROID) 25 MCG tablet, Take 25 mcg by mouth., Disp: , Rfl:   •  losartan (COZAAR) 100 MG tablet, TAKE 1 TABLET BY MOUTH DAILY, Disp: , Rfl:   •  meloxicam (MOBIC) 7.5 MG tablet, Take 1 tablet by mouth Daily As Needed., Disp: , Rfl:   •  omeprazole (priLOSEC) 20 MG capsule, Take 20 mg by mouth., Disp: , Rfl:   •  polyethylene glycol (MIRALAX) packet, Take 17 g by mouth Daily., Disp: 1 each, Rfl: 0  •  simvastatin (ZOCOR) 40 MG tablet, TAKE 1 TABLET BY MOUTH NIGHTLY, Disp: 90 tablet, Rfl: 1  •  timolol (TIMOPTIC) 0.5 % ophthalmic solution, Apply 1 drop to eye 2 (Two) Times a Day., Disp: , Rfl:   •  vitamin C (ASCORBIC ACID) 500 MG tablet, Take 500 mg by mouth Daily., Disp: , Rfl:   •  albuterol (PROVENTIL) (2.5 MG/3ML) 0.083% nebulizer solution, INHALE 3 MILLILITERS (2.5 MG) BY NEBULIZATION ROUTE EVERY 6 HOURS AS NEEDED, Disp: , Rfl:   •  sertraline (ZOLOFT) 50 MG tablet, Take 1 tablet by mouth Daily., Disp: 30 tablet, Rfl: 4          Allergies   Allergen Reactions   • Contrast Dye Hives      Preventative Medicine  Colonoscopy: 2017     Review of Systems   Respiratory: Positive for apnea. Negative for shortness of breath.    Cardiovascular: Negative for chest pain.   Genitourinary: Positive for frequency.   Allergic/Immunologic: Positive for environmental allergies.        No history of wound infections or MRSA   Hematological: Does not bruise/bleed easily.   Psychiatric/Behavioral: The patient is nervous/anxious.    All other systems reviewed and are negative.        Vitals       Vitals:     01/24/18 1355   Pulse: 69   SpO2: 95%   Weight: 88.9 kg (196 lb)   Height: 162.6 cm (64\")            PHYSICAL EXAM:     Pulse 69  Ht 162.6 cm (64\")  Wt 88.9 kg (196 lb)  SpO2 95%  BMI 33.64 kg/m2  Body mass index is 33.64 kg/(m^2).     Constitutional: well developed, well " nourished, Appears healthy, younger than stated age  Eyes: sclera nonicteric, conjunctiva not injected or edematous, with glasses  ENMT: Hearing intact, trachea midline, thyroid without masses  CVS: RRR, no murmur, peripheral edema 1+, wearing Jobst hose, left tighter than right  Respiratory: CTA, normal respiratory effort   Gastrointestinal: no hepatosplenomegaly, abdomen nontender, abdominal hernia not detected  Genitourinary: inguinal hernia not detected  Musculoskeletal: gait normal, muscle mass normal  Skin: warm and dry, no rashes visible  Neurological: awake and alert, seems to have reasonable capacity for understanding for medical decision making  Psychiatric: appears to have reasonable judgement, very pleasant, sharp  Lymphatics: no cervical adenopathy      Radiographic Findings: SPECT CT scan findings as listed     Lab Findings: As listed     Pamphlet reviewed: Parathyroid adenoma     IMPRESSION:  · Primary hyperparathyroidism, with calcium 11.3, intact PTH 68.6.  Urine studies are consistent with primary hyperparathyroidism, not AT age H  · Symptoms of kidney stones, fatigue, osteopenia, with history of fracture  · Sleep apnea with CPAP use  · Asthma with inhaler use, managed by Dr. Hartley  · Anxiety on Zoloft  · Meloxicam use when necessary for arthritis, though she tells me now she is virtually quit using this as she believes it may be causing some swelling.  · Hypertension  · Vitamin D deficiency corrected on supplementation     PLAN:  · SPECT CT scan.  She would like to repeat it just to make sure there are no new findings, and in the context of the supervising/reading physician at her last study I think this is reasonable.  · She is comfortable that we've discussed the risk and benefits fully enough that she wants to go ahead and put the order in for the surgery, which I have done as a left inferior parathyroid adenoma resection with possible bilateral exploration.  We will have to modify the case  "if the study reveals something different  · Bring CPAP day of surgery  · Bring inhalers day of surgery  · Hold her meloxicam 1 week prior  · The risk of parathyroid surgery were discussed with the patient including bleeding, infection, recurrent laryngeal nerve injury, hypocalcemia potentially necessitating temporary or permanent supplementation with calcium and/or activated vitamin D.  We also discussed the potential that the affected gland may not be located and hypercalcemia may remain.  Of course, scarring will be present  · We discussed the intended outpatient nature of this procedure but if the gland cannot be found in the expected location, bilateral exploration will be needed and likely overnight stay.  In this case, supplementation will be more likely and for a more extended period.     Adamaris Bhandari MD  5:04 PM  1/24/18     Addendum 02/17/18  Repeat SPECT CT shows again 2 suspected adenomas, right mid and left upper, with the right mid most convincing.  On the right, it is suspected by the early planar, delayed planar, delayed SPECT and CT (6 mm).  On the left it is seen only on the delayed SPECT and CT (7 mm triangle).  The only change is the description is that of stating the left side is superior rather than \"below\".  Will change the case request to bilateral exploration and parathyroid resection.     Adamaris Bhandari MD       "

## 2018-03-15 NOTE — ANESTHESIA PREPROCEDURE EVALUATION
Anesthesia Evaluation     Patient summary reviewed   no history of anesthetic complications:  NPO Solid Status: > 8 hours  NPO Liquid Status: > 2 hours           Airway   Mallampati: I  TM distance: >3 FB  Neck ROM: full  no difficulty expected  Dental      Pulmonary     breath sounds clear to auscultation  (+) asthma, sleep apnea on CPAP,   (-) shortness of breath, not a smoker  Cardiovascular   Exercise tolerance: good (4-7 METS)    Rhythm: regular  Rate: normal    (+) hypertension, hyperlipidemia,   (-) angina, LAUREN      Neuro/Psych  GI/Hepatic/Renal/Endo    (+)  GERD well controlled,  hypothyroidism (Med controlled),     Musculoskeletal     (-) chronic pain  Abdominal    Substance History      OB/GYN          Other   (+) arthritis                     Anesthesia Plan    ASA 3     general     intravenous induction   Anesthetic plan and risks discussed with patient.

## 2018-03-15 NOTE — ANESTHESIA POSTPROCEDURE EVALUATION
Patient: Ghada Ryan    Procedure Summary     Date:  03/15/18 Room / Location:   [facility]    Anesthesia Start:  0838 Anesthesia Stop:  1039    Procedure:  RIGHT AND LEFT SUPERIOR  PARATHYROID ADENOMA RESECTION (Left Neck) Diagnosis:       Hyperparathyroidism      (Hyperparathyroidism [E21.3])    Surgeon:  Adamaris Bhandari MD Provider:  Medhat Toscano MD    Anesthesia Type:  general ASA Status:  3          Anesthesia Type: general  Last vitals  BP   166/95 (03/15/18 1045)   Temp   36.6 °C (97.9 °F) (03/15/18 1035)   Pulse   65 (03/15/18 1045)   Resp   12 (03/15/18 1045)     SpO2   93 % (03/15/18 1045)     Post Anesthesia Care and Evaluation    Patient location during evaluation: bedside  Patient participation: complete - patient participated  Level of consciousness: awake  Pain management: adequate  Airway patency: patent  Anesthetic complications: No anesthetic complications    Cardiovascular status: acceptable  Respiratory status: acceptable  Hydration status: acceptable    Comments: /95   Pulse 65   Temp 36.6 °C (97.9 °F) (Temporal Artery )   Resp 12   Wt 89.4 kg (197 lb)   SpO2 93%   BMI 33.81 kg/m²

## 2018-03-15 NOTE — OP NOTE
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  She's had 2 SPECT-CT's which unfortunately show bilateral adenomas on each of these, necessitating exploration on both sides.    Other issues:  Symptoms of kidney stones, fatigue, osteopenia, with history of fracture  Sleep apnea with CPAP use  Asthma with inhaler use, managed by Dr. Odilia Eng on Zoloft  Meloxicam use when necessary for arthritis, though she tells me now she is virtually quit using this as she believes it may be causing some swelling.  Hypertension  Vitamin D deficiency, corrected on supplementation    Findings:   · Intraoperative STAT PTH preoperatively 74.7  · Intraoperative STAT PTH post excision, 20 minutes, 22  · Pathology confirmed a hypercellular parathyroid adenoma on the left, and parathyroid tissue on the right that was nondiagnostic for hypercellularity    Recommendations:   · Repeat intraoperative stat PTH now at 36 minutes post procedure, to determine if the patient is suitable for discharge or needs to remain in-house overnight to observe her calcium level    Technique:     General anesthetic was induced.  IV Kefzol was given.  The neck was extended, and then prepped with Hibiclens and draped sterilely.    The neck was examined and the skin creases evaluated to determine the best location for the incision, attempting to maximize both operative exposure and post op cosmesis.  I selected a site that was not very dramatic as first decreased, but the only one  that she had, that was a little bit higher than we usually would use, and marked.      1/2% Marcaine with epinephrine was used to inject the site.  Incision was made thru the skin and subcutaneous space and then completed with cautery to the cervical fascia.  Flaps were then raised with the facelift retractors and cautery, directly on the anterior aspect of the fascia, both superiorly and inferiorly.  The strap muscles were then divided in the midline.    Dissection was then begun under the strap muscle on the right side.  The bipolar on 15 was used.  I dissected the fatty tissue free from the strap muscle on the undersurface, then ultimately grasped the thyroid and lifted it up with an Allis.    I dissected posteriorly, and identified what I thought was the parathyroid superiorly.  This was somewhat fragmented.  I will lifted that up, resected it from the surrounding tissue, and placed a clip at the base.  I had already identified the nerve inferior to that with certainty.  That parathyroid was sent off to pathology, and was in fact noted to be parathyroid.  Ultimately we identified the inferior parathyroid on the very posterior aspect of the inferior pole, and it was not enlarged.    I then went to the left side, dissected out the more superior pole of the thyroid, and identified a fatty blob of tissue, which we sent to pathology, indicating that we thought it was likely fat.  This ultimately was confirmed to be such.  We dissected more inferiorly, and ultimately identified posteriorly, a very enlarged parathyroid, lifted that out, resected down to the vascular pedicle, and clipped.  Dissected further inferiorly, and identified what I thought was a very small parathyroid, and obviously left that in place.    We collected blood at the 20 minute jessica post excision, and sent that for intraoperative stat PTH with a finding of 22, thus reduced to less than a third.  Ultimately, we redrew later, to ascertain whether she  would be suitable to be discharged.    We examined both sides to make sure that there was adequate hemostasis, using the bipolar, and the cautery for hemostasis.  Surgicel was placed.    The wound was then closed with a running 3-0 Vicryl to the midline strap muscles, followed by interrupted 3-0 vicryl to the platysma, interrupted 3-0 vicryl to the subcutaneous, and running 5-0 vicryl to the skin.  Skin affix was applied to the wound.    Adamaris Bhandari MD  03/15/18  10:29 AM    Addendum  Repeat intraoperative STAT PTH in recovery room is 12.7, thus will watch overnight to see if IV calcium is needed.  Adamaris Bhandari MD  11:03 AM

## 2018-03-15 NOTE — ANESTHESIA PROCEDURE NOTES
Airway  Urgency: elective    Date/Time: 3/15/2018 8:46 AM  Airway not difficult    General Information and Staff    Patient location during procedure: OR  Anesthesiologist: IVC BAY  CRNA: LEO CONKLIN    Indications and Patient Condition  Indications for airway management: airway protection    Preoxygenated: yes  MILS maintained throughout  Mask difficulty assessment: 2 - vent by mask + OA or adjuvant +/- NMBA    Final Airway Details  Final airway type: endotracheal airway      Successful airway: ETT  Cuffed: yes   Successful intubation technique: direct laryngoscopy  Facilitating devices/methods: intubating stylet  Endotracheal tube insertion site: oral  Blade: Inkky  Blade size: #3  ETT size: 7.0 mm  Cormack-Lehane Classification: grade I - full view of glottis  Placement verified by: chest auscultation and capnometry   Cuff volume (mL): 8  Measured from: lips  ETT to lips (cm): 21  Number of attempts at approach: 1    Additional Comments  Patient in OR. Monitors on. BLVS. Pre 02 100%. SIVI. DL x1. DVVC. Atraumatic placement of ETT. Placement verified with ETC02 and BBS. ETT secured. Teeth/lips in pre-op condition.

## 2018-03-16 VITALS
SYSTOLIC BLOOD PRESSURE: 133 MMHG | WEIGHT: 197 LBS | RESPIRATION RATE: 20 BRPM | DIASTOLIC BLOOD PRESSURE: 70 MMHG | TEMPERATURE: 97.1 F | OXYGEN SATURATION: 95 % | BODY MASS INDEX: 33.82 KG/M2 | HEART RATE: 61 BPM

## 2018-03-16 LAB
CALCIUM SPEC-SCNC: 9.1 MG/DL (ref 8.6–10.5)
CYTO UR: NORMAL
LAB AP CASE REPORT: NORMAL
LAB AP CLINICAL INFORMATION: NORMAL
Lab: NORMAL
Lab: NORMAL
PATH REPORT.FINAL DX SPEC: NORMAL
PATH REPORT.GROSS SPEC: NORMAL
PTH-INTACT SERPL-MCNC: 18.1 PG/ML (ref 15–65)

## 2018-03-16 PROCEDURE — A9270 NON-COVERED ITEM OR SERVICE: HCPCS | Performed by: SURGERY

## 2018-03-16 PROCEDURE — 82310 ASSAY OF CALCIUM: CPT | Performed by: SURGERY

## 2018-03-16 PROCEDURE — 63710000001 CALCIUM-VITAMIN D 500-200 MG-UNIT TABLET: Performed by: SURGERY

## 2018-03-16 PROCEDURE — 63710000001 LOSARTAN 100 MG TABLET: Performed by: SURGERY

## 2018-03-16 PROCEDURE — 25010000002 HYDROMORPHONE HCL PF 500 MG/50ML SOLUTION: Performed by: SURGERY

## 2018-03-16 PROCEDURE — 63710000001 BUDESONIDE-FORMOTEROL 80-4.5 MCG/ACT AEROSOL 6.9 G INHALER: Performed by: SURGERY

## 2018-03-16 PROCEDURE — 94640 AIRWAY INHALATION TREATMENT: CPT

## 2018-03-16 PROCEDURE — 63710000001 OXYCODONE-ACETAMINOPHEN 5-325 MG TABLET: Performed by: SURGERY

## 2018-03-16 PROCEDURE — 94799 UNLISTED PULMONARY SVC/PX: CPT

## 2018-03-16 PROCEDURE — 63710000001 ATORVASTATIN 20 MG TABLET: Performed by: SURGERY

## 2018-03-16 PROCEDURE — 99024 POSTOP FOLLOW-UP VISIT: CPT | Performed by: SURGERY

## 2018-03-16 PROCEDURE — 63710000001 POLYETHYLENE GLYCOL PACK: Performed by: SURGERY

## 2018-03-16 PROCEDURE — 63710000001 LEVOTHYROXINE 25 MCG TABLET: Performed by: SURGERY

## 2018-03-16 PROCEDURE — G0378 HOSPITAL OBSERVATION PER HR: HCPCS

## 2018-03-16 PROCEDURE — 63710000001 CALCITRIOL 0.25 MCG CAPSULE: Performed by: SURGERY

## 2018-03-16 PROCEDURE — 83970 ASSAY OF PARATHORMONE: CPT | Performed by: SURGERY

## 2018-03-16 RX ORDER — CALCITRIOL 0.25 UG/1
0.25 CAPSULE, LIQUID FILLED ORAL DAILY
Qty: 30 CAPSULE | Refills: 0 | Status: SHIPPED | OUTPATIENT
Start: 2018-03-17 | End: 2018-03-20 | Stop reason: DRUGHIGH

## 2018-03-16 RX ORDER — ATORVASTATIN CALCIUM 20 MG/1
20 TABLET, FILM COATED ORAL NIGHTLY
Status: DISCONTINUED | OUTPATIENT
Start: 2018-03-16 | End: 2018-03-16 | Stop reason: HOSPADM

## 2018-03-16 RX ADMIN — CALCITRIOL 0.25 MCG: 0.25 CAPSULE, LIQUID FILLED ORAL at 09:59

## 2018-03-16 RX ADMIN — POLYETHYLENE GLYCOL 3350 17 G: 17 POWDER, FOR SOLUTION ORAL at 09:58

## 2018-03-16 RX ADMIN — ATORVASTATIN CALCIUM 20 MG: 20 TABLET, FILM COATED ORAL at 02:54

## 2018-03-16 RX ADMIN — BUDESONIDE AND FORMOTEROL FUMARATE DIHYDRATE 2 PUFF: 80; 4.5 AEROSOL RESPIRATORY (INHALATION) at 07:51

## 2018-03-16 RX ADMIN — CALCIUM CARBONATE-VITAMIN D TAB 500 MG-200 UNIT 1000 MG: 500-200 TAB at 09:59

## 2018-03-16 RX ADMIN — OXYCODONE HYDROCHLORIDE AND ACETAMINOPHEN 1 TABLET: 5; 325 TABLET ORAL at 06:01

## 2018-03-16 RX ADMIN — ALBUTEROL SULFATE 2.5 MG: 2.5 SOLUTION RESPIRATORY (INHALATION) at 03:18

## 2018-03-16 RX ADMIN — TIMOLOL MALEATE 1 DROP: 5 SOLUTION/ DROPS OPHTHALMIC at 09:59

## 2018-03-16 RX ADMIN — LOSARTAN POTASSIUM 100 MG: 100 TABLET ORAL at 09:59

## 2018-03-16 RX ADMIN — HYDROMORPHONE HYDROCHLORIDE 0.5 MG: 10 INJECTION INTRAMUSCULAR; INTRAVENOUS; SUBCUTANEOUS at 02:54

## 2018-03-16 RX ADMIN — LEVOTHYROXINE SODIUM 25 MCG: 25 TABLET ORAL at 06:01

## 2018-03-16 RX ADMIN — OXYCODONE HYDROCHLORIDE AND ACETAMINOPHEN 1 TABLET: 5; 325 TABLET ORAL at 10:09

## 2018-03-16 NOTE — PROGRESS NOTES
"GENERAL SURGERY  Ghada Ryan  1935  1 Day Post-Op    CC:  \"i'm good\"    HPI:  Did well overnight.  No problems with numbness or tingling.  Calcium yesterday afternoon was 9.2, down from 11.  Lab machine down for maintenance, and so labs from this morning still not back.    Diet:  regular     Vitals:    03/16/18 0401 03/16/18 0742 03/16/18 0751 03/16/18 0757   BP: 133/75 133/70     BP Location: Left arm Right arm     Patient Position: Lying Lying     Pulse: 61 62 64 61   Resp: 16 16 20 20   Temp: 97.5 °F (36.4 °C) 97.1 °F (36.2 °C)     TempSrc: Oral Oral     SpO2: 93% 95% 95%    Weight:         Intake & Output (last day)       03/15 0701 - 03/16 0700 03/16 0701 - 03/17 0700    P.O. 680     I.V. (mL/kg) 3161.7 (35.4)     Total Intake(mL/kg) 3841.7 (43)     Urine (mL/kg/hr) 3350 (1.6)     Blood 20 (0)     Total Output 3370      Net +471.7                  Physical Exam   Constitutional: She appears well-developed and well-nourished.   HENT:   Nasal cannula  Neck incision clean, dry and intact   Pulmonary/Chest: Effort normal.   Abdominal: Soft.       Pertinent labs/imaging:        Results from last 7 days  Lab Units 03/15/18  1539   CALCIUM mg/dL 9.2       Assessment:   · 1 Day Post-Op S/P two parathyroid adenoma resection.  · Dropping calcium, now on calcium and calcitriol supplement  · Asthma, on O2    Plan  · Await labs, and if satisfactory, discharge today.    Adamaris Bhandari MD  03/16/18  9:08 AM    "

## 2018-03-16 NOTE — PLAN OF CARE
Problem: Patient Care Overview  Goal: Plan of Care Review  Outcome: Outcome(s) achieved Date Met: 03/16/18 03/16/18 0532   OTHER   Outcome Summary adequate pain control, incision CDI open to air, voiding frequently, occasional congested cough noted, no SOA, duo nebs PRN administered, repeat labs this am, possible home   Coping/Psychosocial   Plan of Care Reviewed With patient   Plan of Care Review   Progress improving     Goal: Individualization and Mutuality  Outcome: Ongoing (interventions implemented as appropriate)    Goal: Discharge Needs Assessment  Outcome: Ongoing (interventions implemented as appropriate)      Problem: Fall Risk (Adult)  Goal: Absence of Fall  Outcome: Ongoing (interventions implemented as appropriate)      Problem: Pain, Acute (Adult)  Goal: Identify Related Risk Factors and Signs and Symptoms  Outcome: Outcome(s) achieved Date Met: 03/16/18    Goal: Acceptable Pain Control/Comfort Level  Outcome: Ongoing (interventions implemented as appropriate)

## 2018-03-16 NOTE — DISCHARGE SUMMARY
Discharge Summary    Patient name: Ghada Ryan    Medical record number: [id number]    Admission date: 3/15/2018  Discharge date: 3/16/2018     Attending physician: Dr. Adamaris Bhandari    Primary care physician: Miguelito Connolly MD    Referring physician: Adamaris Bhandari MD  [address]  [place] [address]    Consulting physician(s):  none    Primary Diagnoses:    · hyperparathyroidism    Secondary Diagnoses:     · Asthma  · Body mass index is 33.82 kg/m².     Procedure/Proc Date:      · Two gland parathyroid excision: 3/15/2018    Hospital Course:   The patient is a very pleasant 82 y.o. female that was admitted to the hospital on 3/15/2018 for the aforementioned elective surgery.  She had been councelled prior to surgery that we anticipated two glands would need resection.  We did this and then followed her PTH, which decreased to an extent that discharge was not possible.    I supplemented her with calcium and calcitriol, and her calcium remained relatively stable, for discharge.      Pathology:   · Not back    Discharge medications:      Your medication list      START taking these medications      Instructions Last Dose Given Next Dose Due   calcitriol 0.25 MCG capsule  Commonly known as:  ROCALTROL      Take 1 capsule by mouth Daily.       calcitriol 0.25 MCG capsule  Commonly known as:  ROCALTROL  Start taking on:  3/17/2018      Take 1 capsule by mouth Daily.       calcium-vitamin D 500-200 MG-UNIT per tablet  Commonly known as:  OSCAL 500/200 D-3      Take 2 tablets by mouth 2 (Two) Times a Day for 30 days.       calcium-vitamin D 500-200 MG-UNIT tablet per tablet      Take 2 tablets by mouth 3 (Three) Times a Day.       HYDROcodone-acetaminophen 5-325 MG per tablet  Commonly known as:  NORCO      1-2 tabs q 6 hours prn pain.  Maximum tabs 6 daily       ondansetron 4 MG tablet  Commonly known as:  ZOFRAN      Take 1 tablet by mouth Every 8 (Eight) Hours As Needed for Nausea or  Vomiting for up to 20 doses.          CHANGE how you take these medications      Instructions Last Dose Given Next Dose Due   meloxicam 7.5 MG tablet  Commonly known as:  MOBIC  Start taking on:  3/20/2018  What changed:  reasons to take this      Take 1 tablet by mouth Daily As Needed for Mild Pain .          CONTINUE taking these medications      Instructions Last Dose Given Next Dose Due   albuterol 108 (90 Base) MCG/ACT inhaler  Commonly known as:  PROVENTIL HFA;VENTOLIN HFA      Inhale 2 puffs.       albuterol (2.5 MG/3ML) 0.083% nebulizer solution  Commonly known as:  PROVENTIL      INHALE 3 MILLILITERS (2.5 MG) BY NEBULIZATION ROUTE EVERY 6 HOURS AS NEEDED       BREO ELLIPTA 100-25 MCG/INH aerosol powder   Generic drug:  Fluticasone Furoate-Vilanterol      Inhale 1 puff Daily.       levothyroxine 25 MCG tablet  Commonly known as:  SYNTHROID, LEVOTHROID      Take 25 mcg by mouth.       losartan 100 MG tablet  Commonly known as:  COZAAR      TAKE 1 TABLET BY MOUTH DAILY       omeprazole 20 MG capsule  Commonly known as:  priLOSEC      Take 20 mg by mouth.       polyethylene glycol packet  Commonly known as:  MIRALAX      Take 17 g by mouth Daily.       sertraline 50 MG tablet  Commonly known as:  ZOLOFT      Take 1 tablet by mouth Daily.       simvastatin 40 MG tablet  Commonly known as:  ZOCOR      TAKE 1 TABLET BY MOUTH NIGHTLY       timolol 0.5 % ophthalmic solution  Commonly known as:  TIMOPTIC      Apply 1 drop to eye 2 (Two) Times a Day.       VITAMIN B COMPLEX PO      Take 1 tablet by mouth Daily.       vitamin C 500 MG tablet  Commonly known as:  ASCORBIC ACID      Take 500 mg by mouth Daily.             Where to Get Your Medications      These medications were sent to [facility]/pharmacy #[id number] - [place], [place] - [address] - [phone]  - [phone]   [address], [facility] [place] [address]    Phone:  [phone]    calcitriol 0.25 MCG  capsule   calcitriol 0.25 MCG capsule   calcium-vitamin D 500-200 MG-UNIT tablet per tablet   ondansetron 4 MG tablet     You can get these medications from any pharmacy    Bring a paper prescription for each of these medications   calcium-vitamin D 500-200 MG-UNIT per tablet   HYDROcodone-acetaminophen 5-325 MG per tablet     Information about where to get these medications is not yet available    Ask your nurse or doctor about these medications   meloxicam 7.5 MG tablet         Discharge diet:  · regular    Recommendations:    · Labs on monday        Adamaris Bhandari MD   Office Number: [phone].

## 2018-03-16 NOTE — PROGRESS NOTES
Continued Stay Note  [facility]     Patient Name: Ghada Ryan  MRN: [id number]  Today's Date: 3/16/2018    Admit Date: 3/15/2018          Discharge Plan     Row Name 03/16/18 1053       Plan    Plan Comments Pt signed [employer] Observation letter, she said she plans to return back to [facility] where she lives and does not anticipate any discharge needs. Krista Zhou RN    Final Discharge Disposition Code 01 - home or self-care              Discharge Codes    No documentation.       Expected Discharge Date and Time     Expected Discharge Date Expected Discharge Time    Mar 16, 2018             Krista Zhou, RN

## 2018-03-19 ENCOUNTER — LAB (OUTPATIENT)
Dept: LAB | Facility: HOSPITAL | Age: 83
End: 2018-03-19

## 2018-03-19 ENCOUNTER — TELEPHONE (OUTPATIENT)
Dept: SURGERY | Facility: CLINIC | Age: 83
End: 2018-03-19

## 2018-03-19 DIAGNOSIS — E21.3 HYPERPARATHYROIDISM (HCC): Primary | ICD-10-CM

## 2018-03-19 DIAGNOSIS — E21.3 HYPERPARATHYROIDISM (HCC): ICD-10-CM

## 2018-03-19 LAB
CALCIUM SPEC-SCNC: 9.1 MG/DL (ref 8.6–10.5)
PTH-INTACT SERPL-MCNC: 25.1 PG/ML (ref 15–65)

## 2018-03-19 PROCEDURE — 36415 COLL VENOUS BLD VENIPUNCTURE: CPT

## 2018-03-19 PROCEDURE — 83970 ASSAY OF PARATHORMONE: CPT

## 2018-03-19 PROCEDURE — 82310 ASSAY OF CALCIUM: CPT

## 2018-03-19 NOTE — TELEPHONE ENCOUNTER
----- Message from Adamaris Bhandari MD sent at 3/19/2018 10:47 AM EDT -----  Jaclyn (surgery office liaison),    Please call patient to inform her that her calcium and PTH are normal.  Unless she is having symptoms of numbness, tingling, or cramping, she should decrease her calcitriol to qod and be rechecked in 2 weeks.  Let me know if she is appropriate to decrease and then i will enter new labs, and change med order.         Thanks  Dr Bhandari    Changed med order and put in lab order.  Adamaris Bhandari MD'

## 2018-03-19 NOTE — PROGRESS NOTES
Jaclyn (surgery office liaison),    Please call patient to inform her that her calcium and PTH are normal.  Unless she is having symptoms of numbness, tingling, or cramping, she should decrease her calcitriol to qod and be rechecked in 2 weeks.  Let me know if she is appropriate to decrease and then i will enter new labs, and change med order.         Thanks  Dr Bhandari

## 2018-03-20 RX ORDER — CALCITRIOL 0.25 UG/1
0.25 CAPSULE, LIQUID FILLED ORAL EVERY OTHER DAY
Qty: 30 CAPSULE | Refills: 0 | Status: SHIPPED | OUTPATIENT
Start: 2018-03-20 | End: 2018-04-11

## 2018-03-26 ENCOUNTER — OFFICE VISIT (OUTPATIENT)
Dept: SURGERY | Facility: CLINIC | Age: 83
End: 2018-03-26

## 2018-03-26 DIAGNOSIS — E21.3 HYPERPARATHYROIDISM (HCC): ICD-10-CM

## 2018-03-26 DIAGNOSIS — E21.0 PRIMARY HYPERPARATHYROIDISM (HCC): ICD-10-CM

## 2018-03-26 DIAGNOSIS — E83.52 HYPERCALCEMIA: Primary | ICD-10-CM

## 2018-03-26 PROCEDURE — 99024 POSTOP FOLLOW-UP VISIT: CPT | Performed by: SURGERY

## 2018-03-26 NOTE — PROGRESS NOTES
SURGERY: JOHN  Post op Visit  Ghada Ryan  03/26/18    Sister Ghada Astorga comes in today, after a left superior and right superior parathyroid adenoma resection, on 3/15/18, for primary hyperparathyroidism, suspected right mid, left superior by SPECT-CT scan.  Her pathology returned with the right parathyroid being fairly normal, then on the left being hypercellular.  Her preoperative calcium was 11.3, PTH 68.6, and her post-excision stat PTH 22.    She had her labs drawn last week, with a normal calcium at 9.1, intact PTH 25.  I thus suggested that she decrease her calcitriol to 0.25 µg every other day, and 1 calcium tablet daily.  We've written this down for her today for clarification.  She needs to have follow-up labs in 2 weeks.  Her incision looks great.  We'll call her with the results of her next labs.    Adamaris Bhandari MD  6:53 PM

## 2018-04-09 ENCOUNTER — TELEPHONE (OUTPATIENT)
Dept: SURGERY | Facility: CLINIC | Age: 83
End: 2018-04-09

## 2018-04-09 NOTE — TELEPHONE ENCOUNTER
I opened the chart to order labs, and i got an alert that she already had lab orders in that were expected.  I believe they are already in there.      Adamaris Bhandari MD

## 2018-04-11 ENCOUNTER — LAB (OUTPATIENT)
Dept: LAB | Facility: HOSPITAL | Age: 83
End: 2018-04-11

## 2018-04-11 ENCOUNTER — OFFICE VISIT (OUTPATIENT)
Dept: FAMILY MEDICINE CLINIC | Facility: CLINIC | Age: 83
End: 2018-04-11

## 2018-04-11 VITALS
HEIGHT: 64 IN | SYSTOLIC BLOOD PRESSURE: 120 MMHG | RESPIRATION RATE: 16 BRPM | BODY MASS INDEX: 32.78 KG/M2 | DIASTOLIC BLOOD PRESSURE: 74 MMHG | WEIGHT: 192 LBS

## 2018-04-11 DIAGNOSIS — I10 ESSENTIAL HYPERTENSION: ICD-10-CM

## 2018-04-11 DIAGNOSIS — E21.3 HYPERPARATHYROIDISM (HCC): ICD-10-CM

## 2018-04-11 DIAGNOSIS — G89.29 CHRONIC RIGHT SHOULDER PAIN: Primary | ICD-10-CM

## 2018-04-11 DIAGNOSIS — M25.511 CHRONIC RIGHT SHOULDER PAIN: Primary | ICD-10-CM

## 2018-04-11 DIAGNOSIS — E03.9 ACQUIRED HYPOTHYROIDISM: ICD-10-CM

## 2018-04-11 LAB
CALCIUM SPEC-SCNC: 8.8 MG/DL (ref 8.6–10.5)
PTH-INTACT SERPL-MCNC: 37.8 PG/ML (ref 15–65)

## 2018-04-11 PROCEDURE — 36415 COLL VENOUS BLD VENIPUNCTURE: CPT

## 2018-04-11 PROCEDURE — 99214 OFFICE O/P EST MOD 30 MIN: CPT | Performed by: FAMILY MEDICINE

## 2018-04-11 PROCEDURE — 82310 ASSAY OF CALCIUM: CPT

## 2018-04-11 PROCEDURE — 83970 ASSAY OF PARATHORMONE: CPT

## 2018-04-11 NOTE — PATIENT INSTRUCTIONS
You are doing really well.  Please follow up with me in 3 months.  I have referred you to PT at [facility].

## 2018-04-11 NOTE — PROGRESS NOTES
Jaclyn.  Please let Sister know that her calcium and PTH are now normal.    She should stop her calcitriol.  I have changed her med rec to reflect that.  She should check with Dr Miguelito Connolly when seen next time, if she wants her to continue the calcium or stop it.  Thanks  Dr garcia

## 2018-04-11 NOTE — PROGRESS NOTES
Subjective   Ghada Ryan is a 82 y.o. female.     History of Present Illness   Pt is here for a f/u for her hypertension and thyroid.  Her blood pressure is stable today.   She just had her blood drawn today for her calcium and thyroid.   Pt says she is been having pain in her Right shoulder. She had a tear in her rotator time ago.She has had PT for this in the past. She has re-injured this recently. She has dull pain tht makes it hard to sleep at night. Described as a dull pain, and goes down through her arm.  Pt states she is following a diet to lost weight, through [facility] [employer], and she says she is having a hard time to loose weight.   She has hypothyroidism and she just recently had her parathyroids removed. She is recovering well.  The following portions of the patient's history were reviewed and updated as appropriate: allergies, current medications, past medical history, past social history, past surgical history and problem list.    Review of Systems   Musculoskeletal:        R shoulder pain       Objective   Physical Exam   Constitutional: She is oriented to person, place, and time. She appears well-developed.   HENT:   Head: Normocephalic and atraumatic.   Eyes: EOM are normal. Pupils are equal, round, and reactive to light.   Neck: Normal range of motion.   Well healing 3 inch scar on anterior neck.   Cardiovascular: Normal rate, regular rhythm and normal heart sounds.    Pulmonary/Chest: Effort normal and breath sounds normal.   Musculoskeletal:   Right shoulder has pain with rotation, abduction and adduction.   Neurological: She is alert and oriented to person, place, and time.   Skin: No rash noted.   Psychiatric: She has a normal mood and affect. Her behavior is normal. Judgment and thought content normal.   Nursing note and vitals reviewed.      Assessment/Plan   Ghada was seen today for hypertension.    Diagnoses and all orders for this visit:    Chronic right  shoulder pain  -     Ambulatory Referral to Physical Therapy Evaluate and treat    Essential hypertension  Well controlled on current medication, will refill medication today and as needed. She will RTO for repeat B/P check in 6 months.    Acquired hypothyroidism  She will come back in 3 months for a TSH check. She is going to get this checked today.

## 2018-04-12 ENCOUNTER — TELEPHONE (OUTPATIENT)
Dept: SURGERY | Facility: CLINIC | Age: 83
End: 2018-04-12

## 2018-04-12 NOTE — TELEPHONE ENCOUNTER
----- Message from Adamaris Bhandari MD sent at 4/11/2018 12:08 PM EDT -----  Jaclyn.  Please let Sister know that her calcium and PTH are now normal.    She should stop her calcitriol.  I have changed her med rec to reflect that.  She should check with Dr Miguelito Connolly when seen next time, if she wants her to continue the calcium or stop it.  Thanks  Dr garcia

## 2018-04-19 RX ORDER — MELOXICAM 15 MG/1
TABLET ORAL
Qty: 30 TABLET | Refills: 1 | Status: SHIPPED | OUTPATIENT
Start: 2018-04-19 | End: 2018-06-25 | Stop reason: SDUPTHER

## 2018-05-09 RX ORDER — LOSARTAN POTASSIUM 100 MG/1
TABLET ORAL
Qty: 90 TABLET | Refills: 3 | Status: SHIPPED | OUTPATIENT
Start: 2018-05-09 | End: 2018-07-31

## 2018-05-17 ENCOUNTER — OFFICE VISIT (OUTPATIENT)
Dept: ORTHOPEDIC SURGERY | Facility: CLINIC | Age: 83
End: 2018-05-17

## 2018-05-17 VITALS — WEIGHT: 193 LBS | TEMPERATURE: 98.7 F | HEIGHT: 64 IN | BODY MASS INDEX: 32.95 KG/M2

## 2018-05-17 DIAGNOSIS — M17.11 PRIMARY OSTEOARTHRITIS OF RIGHT KNEE: Primary | ICD-10-CM

## 2018-05-17 PROCEDURE — 99214 OFFICE O/P EST MOD 30 MIN: CPT | Performed by: ORTHOPAEDIC SURGERY

## 2018-05-17 RX ORDER — MELOXICAM 15 MG/1
15 TABLET ORAL ONCE
Status: CANCELLED | OUTPATIENT
Start: 2018-08-08 | End: 2018-08-08

## 2018-05-17 RX ORDER — CEFAZOLIN SODIUM 2 G/100ML
2 INJECTION, SOLUTION INTRAVENOUS ONCE
Status: CANCELLED | OUTPATIENT
Start: 2018-08-08 | End: 2018-08-08

## 2018-05-17 RX ORDER — PREGABALIN 75 MG/1
150 CAPSULE ORAL ONCE
Status: CANCELLED | OUTPATIENT
Start: 2018-08-08 | End: 2018-08-08

## 2018-05-17 NOTE — PROGRESS NOTES
Patient: Ghada Ryan  YOB: 1935 82 y.o. female  Medical Record Number: [id number]    Chief Complaints:   Chief Complaint   Patient presents with   • Left Knee - Establish Care, Pain   • Right Knee - Establish Care, Pain       History of Present Illness:Ghada Ryan is a 82 y.o. female who presents with Complaints of severe constant right knee pain.  She is here today for evaluation of pain as she is quite limited in her basic activities of daily living.  She has troubles getting out of a chair she has pain with any attempted knee flexion.  She has pain with ambulation and can only walk short distances due to the knee pain.  She has had previous injections and is currently doing physical therapy exercises for the leg.  She is also working on weight loss.  She describes a stabbing aching pain worse about the anterolateral aspect of the knee especially with the knee in a flexed position.    Allergies:   Allergies   Allergen Reactions   • Contrast Dye Hives       Medications:   Current Outpatient Prescriptions   Medication Sig Dispense Refill   • albuterol (PROVENTIL HFA;VENTOLIN HFA) 108 (90 BASE) MCG/ACT inhaler Inhale 2 puffs.     • ASPIRIN 81 PO Take 81 mg by mouth.     • B Complex Vitamins (VITAMIN B COMPLEX PO) Take 1 tablet by mouth Daily.     • Calcium Carb-Cholecalciferol (CALCIUM CARBONATE-VITAMIN D3 PO) Take  by mouth.     • Cetirizine HCl 10 MG capsule Take  by mouth.     • Fluticasone Furoate-Vilanterol (BREO ELLIPTA) 100-25 MCG/INH aerosol powder  Inhale 1 puff Daily.     • levothyroxine (SYNTHROID, LEVOTHROID) 25 MCG tablet Take 25 mcg by mouth.     • losartan (COZAAR) 100 MG tablet TAKE 1 TABLET BY MOUTH EVERY DAY 90 tablet 3   • meloxicam (MOBIC) 15 MG tablet 1/2 TAB BID 30 tablet 1   • omeprazole (priLOSEC) 20 MG capsule Take 20 mg by mouth.     • polyethylene glycol (MIRALAX) packet Take 17 g by mouth Daily. 1 each 0   • sertraline (ZOLOFT) 50 MG tablet Take 1  "tablet by mouth Daily. 90 tablet 0   • simvastatin (ZOCOR) 40 MG tablet TAKE 1 TABLET BY MOUTH NIGHTLY 90 tablet 1   • timolol (TIMOPTIC) 0.5 % ophthalmic solution Apply 1 drop to eye 2 (Two) Times a Day.     • vitamin C (ASCORBIC ACID) 500 MG tablet Take 500 mg by mouth Daily.     • albuterol (PROVENTIL) (2.5 MG/3ML) 0.083% nebulizer solution INHALE 3 MILLILITERS (2.5 MG) BY NEBULIZATION ROUTE EVERY 6 HOURS AS NEEDED       No current facility-administered medications for this visit.          The following portions of the patient's history were reviewed and updated as appropriate: allergies, current medications, past family history, past medical history, past social history, past surgical history and problem list.    Review of Systems:   A 14 point review of systems was performed. All systems negative except pertinent positives/negative listed in HPI above    Physical Exam:   Vitals:    05/17/18 1126   Temp: 98.7 °F (37.1 °C)   TempSrc: Temporal Artery    Weight: 87.5 kg (193 lb)   Height: 161.3 cm (63.5\")       General: A and O x 3, ASA, NAD    SCLERA:    Normal    DENTITION:   Normal     Knee:  right    ALIGNMENT:     Varus  ,   Patella  tracks  midline    GAIT:    Antalgic    SKIN:    No abnormality    RANGE OF MOTION:   0  -  120   DEG    STRENGTH:   4  / 5    LIGAMENTS:    No varus / valgus instability.   Negative  Lachman.    MENISCUS:     Negative   Beena       DISTAL PULSES:    Paplable    DISTAL SENSATION :   Intact    LYMPHATICS:     No   lymphadenopathy    OTHER:          - Positive   effusion      - Crepitance with ROM       Radiology:  Xrays 3views right knee (ap,lateral, sunrise) taken previously demonstrating advanced patellofemoral osteoarthritis with bone on bone articulation, subchondral cysts, and periarticular osteophytes    Assessment/Plan: Right greater than left knee end-stage advanced primarily patellofemoral osteoarthritis.  The pain limits her activities.  Continuation of conservative " management vs. TKA discussed.  The patient wishes to proceed with total knee replacement.  At this point the patient has failed the full compliment of conservative treatment and stating complete understanding of the risks/benefits/ anternatives wishes to proceed with surgical treatment.    Risk and benefits of surgery were reviewed.  Including, but not limited to, blood clots or pulmonary embolism, anesthesia risk, infection, fracture, skin/leg numbness, persistent pain/crepitance/popping/catching, failure of the implant, need for future surgeries, hematoma, possible nerve or blood vessel injury, need for transfusion, and potential risk of stroke,heart attack or death, among others.  The patient understands and wishes to proceed.     It was explained that if tissue has been repaired or reconstructed, there is also an increased chance of failure which may require further management.  Following the completion of the discussion, the patient expressed understanding of this planned course of care, all their questions were answered and consent will be obtained preoperatively.    Operative Plan: Smith and Nephclaudia Oxinium Total Knee Replacement a 3 day staywith inpatient rehab- she does have a history of blood clots and we will place her on Coumadin postoperatively.  We would plan on ultrasound her legs before she heads to [facility] for recovery.      Demond Forrest MD  5/17/2018

## 2018-05-31 PROBLEM — M17.11 PRIMARY OSTEOARTHRITIS OF RIGHT KNEE: Status: ACTIVE | Noted: 2018-05-31

## 2018-06-20 RX ORDER — SIMVASTATIN 40 MG
TABLET ORAL
Qty: 90 TABLET | Refills: 1 | Status: SHIPPED | OUTPATIENT
Start: 2018-06-20 | End: 2018-07-31

## 2018-06-25 RX ORDER — MELOXICAM 15 MG/1
TABLET ORAL
Qty: 30 TABLET | Refills: 0 | Status: SHIPPED | OUTPATIENT
Start: 2018-06-25 | End: 2018-07-26 | Stop reason: SDUPTHER

## 2018-06-28 DIAGNOSIS — F41.9 ANXIETY: ICD-10-CM

## 2018-07-02 ENCOUNTER — OFFICE VISIT (OUTPATIENT)
Dept: FAMILY MEDICINE CLINIC | Facility: CLINIC | Age: 83
End: 2018-07-02

## 2018-07-02 VITALS
DIASTOLIC BLOOD PRESSURE: 88 MMHG | HEART RATE: 66 BPM | RESPIRATION RATE: 14 BRPM | OXYGEN SATURATION: 98 % | SYSTOLIC BLOOD PRESSURE: 136 MMHG | HEIGHT: 64 IN | WEIGHT: 199 LBS | BODY MASS INDEX: 33.97 KG/M2

## 2018-07-02 DIAGNOSIS — M79.89 PAIN AND SWELLING OF LEFT LOWER LEG: ICD-10-CM

## 2018-07-02 DIAGNOSIS — M79.662 PAIN AND SWELLING OF LEFT LOWER LEG: ICD-10-CM

## 2018-07-02 DIAGNOSIS — J45.41 MODERATE PERSISTENT ASTHMA WITH EXACERBATION: Primary | ICD-10-CM

## 2018-07-02 DIAGNOSIS — Z86.718 HISTORY OF DVT (DEEP VEIN THROMBOSIS): ICD-10-CM

## 2018-07-02 DIAGNOSIS — M79.89 PAIN AND SWELLING OF LEFT LOWER LEG: Primary | ICD-10-CM

## 2018-07-02 DIAGNOSIS — M79.662 PAIN AND SWELLING OF LEFT LOWER LEG: Primary | ICD-10-CM

## 2018-07-02 PROCEDURE — 99214 OFFICE O/P EST MOD 30 MIN: CPT | Performed by: FAMILY MEDICINE

## 2018-07-02 RX ORDER — AZITHROMYCIN 250 MG/1
TABLET, FILM COATED ORAL
Qty: 6 TABLET | Refills: 0 | Status: SHIPPED | OUTPATIENT
Start: 2018-07-02 | End: 2018-07-10

## 2018-07-02 RX ORDER — PREDNISONE 20 MG/1
20 TABLET ORAL 2 TIMES DAILY
Qty: 10 TABLET | Refills: 0 | Status: SHIPPED | OUTPATIENT
Start: 2018-07-02 | End: 2018-07-07

## 2018-07-02 NOTE — PROGRESS NOTES
Subjective   Ghada Ryan is a 82 y.o. female.     Chief Complaint   Patient presents with   • URI     chest cold   • Knee Pain     Left       HPI     Chest cold:  -cough productive of clear sputum  -started about 1 week ago while she was on retreat at the [facility] at [facility]  -associated with sore throat, runny nose, and subjective fever last night  -zyrtec has not helped  -she has a hx of asthma for which she takes Breo 1 puff daily and albuterol prn  -she has only used albuterol once with this illness  -she had missed 1 dose of Breo yesterday when she forgot to unpack it    Knee pain:  -LLE pain and swelling x 10 days  -worse than her usual arthritis pain  -no redness or excessive warmth  -hx of medication-provoked DVT in LLE 10-15 yr ago  -hx of B/L knee osteoarthritis and meniscal tears for which she is following up with her Orthopedica Surgeon,  Dr. Demond Forrest 7/13/18    Review of Systems   Constitutional: Positive for fatigue and fever.   HENT: Positive for congestion, rhinorrhea, sinus pain, sinus pressure and sore throat. Negative for ear pain, trouble swallowing and voice change.    Eyes: Negative for pain and redness.   Respiratory: Positive for cough, chest tightness and shortness of breath. Negative for wheezing.    Cardiovascular: Positive for leg swelling (LLE swollen and tender x 10 days). Negative for chest pain and palpitations.   Gastrointestinal: Negative for abdominal pain, diarrhea, nausea and vomiting.   Musculoskeletal: Positive for arthralgias and myalgias.   Skin: Negative for rash and wound.   Neurological: Positive for headaches. Negative for dizziness and weakness.       The following portions of the patient's history were reviewed and updated as appropriate: allergies, current medications, past family history, past medical history, past social history, past surgical history and problem list.    Past Medical History:   Diagnosis Date   • Anemia    • Anxiety    •  Arthritis    • Asthma    • Colon polyps    • Diverticulitis    • Diverticulosis    • DVT, lower extremity, distal (CMS/HCC)    • GERD (gastroesophageal reflux disease)    • Glaucoma    • Hyperlipidemia    • Hypertension    • Hypothyroidism    • Macular degeneration    • Meniscus degeneration    • Parathyroid disease (CMS/HCC)    • Sleep apnea        Social History     Occupational History   • [employer]     Social History Main Topics   • Smoking status: Never Smoker   • Smokeless tobacco: Never Used   • Alcohol use 0.6 oz/week     1 Glasses of wine per week      Comment: rare   • Drug use: No   • Sexual activity: No     Social History Narrative    Lives in community        Allergies   Allergen Reactions   • Contrast Dye Hives        Outpatient Medications Prior to Visit   Medication Sig Dispense Refill   • albuterol (PROVENTIL HFA;VENTOLIN HFA) 108 (90 BASE) MCG/ACT inhaler Inhale 2 puffs.     • albuterol (PROVENTIL) (2.5 MG/3ML) 0.083% nebulizer solution INHALE 3 MILLILITERS (2.5 MG) BY NEBULIZATION ROUTE EVERY 6 HOURS AS NEEDED     • ASPIRIN 81 PO Take 81 mg by mouth.     • B Complex Vitamins (VITAMIN B COMPLEX PO) Take 1 tablet by mouth Daily.     • Cetirizine HCl 10 MG capsule Take  by mouth.     • Fluticasone Furoate-Vilanterol (BREO ELLIPTA) 100-25 MCG/INH aerosol powder  Inhale 1 puff Daily.     • levothyroxine (SYNTHROID, LEVOTHROID) 25 MCG tablet Take 25 mcg by mouth.     • losartan (COZAAR) 100 MG tablet TAKE 1 TABLET BY MOUTH EVERY DAY 90 tablet 3   • meloxicam (MOBIC) 15 MG tablet TAKE 1/2 TABLET BY MOUTH TWICE DAILY 30 tablet 0   • omeprazole (priLOSEC) 20 MG capsule Take 20 mg by mouth.     • polyethylene glycol (MIRALAX) packet Take 17 g by mouth Daily. 1 each 0   • sertraline (ZOLOFT) 50 MG tablet TAKE 1 TABLET BY MOUTH EVERY DAY 90 tablet 1   • simvastatin (ZOCOR) 40 MG tablet TAKE 1 TABLET BY MOUTH NIGHTLY 90 tablet 1   • timolol (TIMOPTIC) 0.5 % ophthalmic solution Apply 1 drop  to eye 2 (Two) Times a Day.     • Calcium Carb-Cholecalciferol (CALCIUM CARBONATE-VITAMIN D3 PO) Take  by mouth.     • vitamin C (ASCORBIC ACID) 500 MG tablet Take 500 mg by mouth Daily.       No facility-administered medications prior to visit.        Objective     Vitals:    07/02/18 1412   BP: 136/88   Pulse: 66   Resp: 14   SpO2: 98%   BMI 35    Physical Exam   Constitutional: She appears well-developed and well-nourished. No distress.   HENT:   Head: Normocephalic and atraumatic.   Mouth/Throat: Oropharynx is clear and moist.   Eyes: Conjunctivae are normal. Pupils are equal, round, and reactive to light.   Neck: No thyromegaly present.   Cardiovascular: Normal rate, regular rhythm and normal heart sounds.  Exam reveals no gallop and no friction rub.    No murmur heard.  Pulmonary/Chest: Effort normal and breath sounds normal. No accessory muscle usage. No respiratory distress. She has no decreased breath sounds. She has no wheezes. She has no rhonchi. She has no rales.   Abdominal: Soft. Bowel sounds are normal. She exhibits no distension. There is no tenderness.   Musculoskeletal: She exhibits edema (LLE 2+ edema, non-pitting, but tender) and tenderness (of LLE distal to the knee, but NO erythema or ecchymoses). She exhibits no deformity.   Lymphadenopathy:     She has no cervical adenopathy.   Skin: Skin is warm and dry.       ASSESSMENT/PLAN             Visit Diagnoses     Moderate persistent asthma with exacerbation    -  Primary    Relevant Medications    azithromycin (ZITHROMAX Z-RAH) 250 MG tablet (Z-pack)    5 day course of predniSONE (DELTASONE) 40 MG daily    Pain and swelling of left lower leg    In pt with History of DVT (deep vein thrombosis)      Relevant Orders    Duplex Venous Lower Extremity - Left CAR to rule out recurrent DVT   DDx also includes ruptured baker's cyst and chronic venous stasis       Patient Instructions   Asthma, Acute Bronchospasm  Acute bronchospasm caused by asthma is also  referred to as an asthma attack. Bronchospasm means your air passages become narrowed. The narrowing is caused by inflammation and tightening of the muscles in the air tubes (bronchi) in your lungs. This can make it hard to breathe or cause you to wheeze and cough.  What are the causes?  Possible triggers are:  · Animal dander from the skin, hair, or feathers of animals.  · Dust mites contained in house dust.  · Cockroaches.  · Pollen from trees or grass.  · Mold.  · Cigarette or tobacco smoke.  · Air pollutants such as dust, household , hair sprays, aerosol sprays, paint fumes, strong chemicals, or strong odors.  · Cold air or weather changes. Cold air may trigger inflammation. Winds increase molds and pollens in the air.  · Strong emotions such as crying or laughing hard.  · Stress.  · Certain medicines such as aspirin or beta-blockers.  · Sulfites in foods and drinks, such as dried fruits and wine.  · Infections or inflammatory conditions, such as a flu, cold, or inflammation of the nasal membranes (rhinitis).  · Gastroesophageal reflux disease (GERD). GERD is a condition where stomach acid backs up into your esophagus.  · Exercise or strenuous activity.    What are the signs or symptoms?  · Wheezing.  · Excessive coughing, particularly at night.  · Chest tightness.  · Shortness of breath.  How is this diagnosed?  Your health care provider will ask you about your medical history and perform a physical exam. A chest X-ray or blood testing may be performed to look for other causes of your symptoms or other conditions that may have triggered your asthma attack.  How is this treated?  Treatment is aimed at reducing inflammation and opening up the airways in your lungs. Most asthma attacks are treated with inhaled medicines. These include quick relief or rescue medicines (such as bronchodilators) and controller medicines (such as inhaled corticosteroids). These medicines are sometimes given through an inhaler  or a nebulizer. Systemic steroid medicine taken by mouth or given through an IV tube also can be used to reduce the inflammation when an attack is moderate or severe. Antibiotic medicines are only used if a bacterial infection is present.  Follow these instructions at home:  · Rest.  · Drink plenty of liquids. This helps the mucus to remain thin and be easily coughed up. Only use caffeine in moderation and do not use alcohol until you have recovered from your illness.  · Do not smoke. Avoid being exposed to secondhand smoke.  · You play a critical role in keeping yourself in good health. Avoid exposure to things that cause you to wheeze or to have breathing problems.  · Keep your medicines up-to-date and available. Carefully follow your health care provider’s treatment plan.  · Take your medicine exactly as prescribed.  · When pollen or pollution is bad, keep windows closed and use an air conditioner or go to places with air conditioning.  · Asthma requires careful medical care. See your health care provider for a follow-up as advised. If you are more than 24 weeks pregnant and you were prescribed any new medicines, let your obstetrician know about the visit and how you are doing. Follow up with your health care provider as directed.  · After you have recovered from your asthma attack, make an appointment with your outpatient doctor to talk about ways to reduce the likelihood of future attacks. If you do not have a doctor who manages your asthma, make an appointment with a primary care doctor to discuss your asthma.  Get help right away if:  · You are getting worse.  · You have trouble breathing. If severe, call your local emergency services (911 in the [place]  · You develop chest pain or discomfort.  · You are vomiting.  · You are not able to keep fluids down.  · You are coughing up yellow, green, brown, or bloody sputum.  · You have a fever and your symptoms suddenly get worse.  · You have trouble  swallowing.  This information is not intended to replace advice given to you by your health care provider. Make sure you discuss any questions you have with your health care provider.  Document Released: 04/03/2008 Document Revised: 05/31/2017 Document Reviewed: 06/25/2014  [employer] Interactive Patient Education © 2017 [employer]      Return if symptoms worsen or fail to improve.      Radha Lindo MD  07/02/18

## 2018-07-02 NOTE — PATIENT INSTRUCTIONS
Asthma, Acute Bronchospasm  Acute bronchospasm caused by asthma is also referred to as an asthma attack. Bronchospasm means your air passages become narrowed. The narrowing is caused by inflammation and tightening of the muscles in the air tubes (bronchi) in your lungs. This can make it hard to breathe or cause you to wheeze and cough.  What are the causes?  Possible triggers are:  · Animal dander from the skin, hair, or feathers of animals.  · Dust mites contained in house dust.  · Cockroaches.  · Pollen from trees or grass.  · Mold.  · Cigarette or tobacco smoke.  · Air pollutants such as dust, household , hair sprays, aerosol sprays, paint fumes, strong chemicals, or strong odors.  · Cold air or weather changes. Cold air may trigger inflammation. Winds increase molds and pollens in the air.  · Strong emotions such as crying or laughing hard.  · Stress.  · Certain medicines such as aspirin or beta-blockers.  · Sulfites in foods and drinks, such as dried fruits and wine.  · Infections or inflammatory conditions, such as a flu, cold, or inflammation of the nasal membranes (rhinitis).  · Gastroesophageal reflux disease (GERD). GERD is a condition where stomach acid backs up into your esophagus.  · Exercise or strenuous activity.    What are the signs or symptoms?  · Wheezing.  · Excessive coughing, particularly at night.  · Chest tightness.  · Shortness of breath.  How is this diagnosed?  Your health care provider will ask you about your medical history and perform a physical exam. A chest X-ray or blood testing may be performed to look for other causes of your symptoms or other conditions that may have triggered your asthma attack.  How is this treated?  Treatment is aimed at reducing inflammation and opening up the airways in your lungs. Most asthma attacks are treated with inhaled medicines. These include quick relief or rescue medicines (such as bronchodilators) and controller medicines (such as inhaled  corticosteroids). These medicines are sometimes given through an inhaler or a nebulizer. Systemic steroid medicine taken by mouth or given through an IV tube also can be used to reduce the inflammation when an attack is moderate or severe. Antibiotic medicines are only used if a bacterial infection is present.  Follow these instructions at home:  · Rest.  · Drink plenty of liquids. This helps the mucus to remain thin and be easily coughed up. Only use caffeine in moderation and do not use alcohol until you have recovered from your illness.  · Do not smoke. Avoid being exposed to secondhand smoke.  · You play a critical role in keeping yourself in good health. Avoid exposure to things that cause you to wheeze or to have breathing problems.  · Keep your medicines up-to-date and available. Carefully follow your health care provider’s treatment plan.  · Take your medicine exactly as prescribed.  · When pollen or pollution is bad, keep windows closed and use an air conditioner or go to places with air conditioning.  · Asthma requires careful medical care. See your health care provider for a follow-up as advised. If you are more than 24 weeks pregnant and you were prescribed any new medicines, let your obstetrician know about the visit and how you are doing. Follow up with your health care provider as directed.  · After you have recovered from your asthma attack, make an appointment with your outpatient doctor to talk about ways to reduce the likelihood of future attacks. If you do not have a doctor who manages your asthma, make an appointment with a primary care doctor to discuss your asthma.  Get help right away if:  · You are getting worse.  · You have trouble breathing. If severe, call your local emergency services (911 in the [place]  · You develop chest pain or discomfort.  · You are vomiting.  · You are not able to keep fluids down.  · You are coughing up yellow, green, brown, or bloody sputum.  · You have a fever  and your symptoms suddenly get worse.  · You have trouble swallowing.  This information is not intended to replace advice given to you by your health care provider. Make sure you discuss any questions you have with your health care provider.  Document Released: 04/03/2008 Document Revised: 05/31/2017 Document Reviewed: 06/25/2014  [employer] Interactive Patient Education © 2017 [employer]

## 2018-07-07 ENCOUNTER — TELEPHONE (OUTPATIENT)
Dept: FAMILY MEDICINE CLINIC | Facility: CLINIC | Age: 83
End: 2018-07-07

## 2018-07-07 DIAGNOSIS — R59.0 LYMPHADENOPATHY, INGUINAL: Primary | ICD-10-CM

## 2018-07-07 NOTE — TELEPHONE ENCOUNTER
I called pt to discuss the findings on her recent LLE doppler US.  The US was negative for any DVT, however it did demonstrate a large baker's cyst, which is the likely source of her increased popliteal pain and swelling.  Pt advised to follow up with her Orthopedic Surgeon, Dr. Forrest as scheduled this coming week to discuss further treatment for the baker's cyst.  I also advised pt to try icing the popliteal fossa BID in the mean time to reduce swelling and pain.    The US also incidentally found bilateral enlarged lymph nodes in the groin.    Pt was acutely ill with a respiratory infection at the time of the doppler US, so it is likely that the lymphadenopathy is reactive, however given pt's advanced age, will obtain follow up US to rule out malignancy.  Pt reports that she is feeling better from a respiratory stand point today.    Pt and Dr. Miguelito Connolly agree with plan for follow up US in 1-2 weeks.

## 2018-07-09 DIAGNOSIS — R09.89 OTHER SPECIFIED SYMPTOMS AND SIGNS INVOLVING THE CIRCULATORY AND RESPIRATORY SYSTEMS: ICD-10-CM

## 2018-07-09 DIAGNOSIS — R59.0 INGUINAL LYMPHADENOPATHY: Primary | ICD-10-CM

## 2018-07-10 ENCOUNTER — OFFICE VISIT (OUTPATIENT)
Dept: FAMILY MEDICINE CLINIC | Facility: CLINIC | Age: 83
End: 2018-07-10

## 2018-07-10 ENCOUNTER — TELEPHONE (OUTPATIENT)
Dept: SURGERY | Facility: CLINIC | Age: 83
End: 2018-07-10

## 2018-07-10 VITALS
HEART RATE: 61 BPM | RESPIRATION RATE: 14 BRPM | BODY MASS INDEX: 33.8 KG/M2 | OXYGEN SATURATION: 98 % | WEIGHT: 198 LBS | HEIGHT: 64 IN | DIASTOLIC BLOOD PRESSURE: 88 MMHG | SYSTOLIC BLOOD PRESSURE: 132 MMHG

## 2018-07-10 DIAGNOSIS — J45.30 MILD PERSISTENT ASTHMA WITHOUT COMPLICATION: ICD-10-CM

## 2018-07-10 DIAGNOSIS — R59.0 LYMPHADENOPATHY, INGUINAL: Primary | ICD-10-CM

## 2018-07-10 LAB
BASOPHILS # BLD AUTO: 0.03 10*3/MM3 (ref 0–0.2)
BASOPHILS NFR BLD AUTO: 0.4 % (ref 0–1.5)
EOSINOPHIL # BLD AUTO: 0.28 10*3/MM3 (ref 0–0.7)
EOSINOPHIL NFR BLD AUTO: 4.1 % (ref 0.3–6.2)
ERYTHROCYTE [DISTWIDTH] IN BLOOD BY AUTOMATED COUNT: 15.3 % (ref 11.7–13)
HCT VFR BLD AUTO: 37.7 % (ref 35.6–45.5)
HGB BLD-MCNC: 11.9 G/DL (ref 11.9–15.5)
IMM GRANULOCYTES # BLD: 0.02 10*3/MM3 (ref 0–0.03)
IMM GRANULOCYTES NFR BLD: 0.3 % (ref 0–0.5)
LYMPHOCYTES # BLD AUTO: 1.96 10*3/MM3 (ref 0.9–4.8)
LYMPHOCYTES NFR BLD AUTO: 28.7 % (ref 19.6–45.3)
MCH RBC QN AUTO: 29.2 PG (ref 26.9–32)
MCHC RBC AUTO-ENTMCNC: 31.6 G/DL (ref 32.4–36.3)
MCV RBC AUTO: 92.6 FL (ref 80.5–98.2)
MONOCYTES # BLD AUTO: 0.48 10*3/MM3 (ref 0.2–1.2)
MONOCYTES NFR BLD AUTO: 7 % (ref 5–12)
NEUTROPHILS # BLD AUTO: 4.07 10*3/MM3 (ref 1.9–8.1)
NEUTROPHILS NFR BLD AUTO: 59.5 % (ref 42.7–76)
PLATELET # BLD AUTO: 273 10*3/MM3 (ref 140–500)
RBC # BLD AUTO: 4.07 10*6/MM3 (ref 3.9–5.2)
WBC # BLD AUTO: 6.84 10*3/MM3 (ref 4.5–10.7)

## 2018-07-10 PROCEDURE — 99214 OFFICE O/P EST MOD 30 MIN: CPT | Performed by: FAMILY MEDICINE

## 2018-07-10 NOTE — TELEPHONE ENCOUNTER
Patient is scheduled for your next available appointment on 7/18, for inguinal lymphadenopathy. Patient would like to be seen before Friday when she has her knee surgery. Would you like to work her in this afternoon? Or is the next available okay?

## 2018-07-10 NOTE — TELEPHONE ENCOUNTER
Per Dr Munoz, patient should have the results of her US prior to coming in for her office visit. I called Zoran with Dr. Connolly's office ([phone]) and advised. We confirmed that the patients appointment for 7/18/18 is appropriate.

## 2018-07-10 NOTE — PROGRESS NOTES
Subjective   Ghada Ryan is a 82 y.o. female.     History of Present Illness     Sister Shelby is here today to follow up on her u/s of the left leg and both groins.   She is supposed to be getting a knee replacement.  She had pain in her left leg and that was what prompted this duplex ultrasound. She notes pain is mostly in the thigh, calf and up near hip.She has a DVT in the left leg about 20 years ago. This was associated with HRT.  The u/s noted that she had bilateral inguinal adenopathy and that this needed further evaluation.   She has been recovering from bronchitis and is doing well but it is lingering.     The following portions of the patient's history were reviewed and updated as appropriate: allergies, current medications, past medical history, past social history, past surgical history and problem list.    Review of Systems   Constitutional: Positive for fatigue. Negative for activity change, appetite change, chills, fever and unexpected weight change.   HENT: Negative.    Eyes: Negative.    Respiratory: Positive for cough. Negative for shortness of breath and wheezing.    Cardiovascular: Positive for leg swelling. Negative for chest pain and palpitations.   Gastrointestinal: Negative.    Endocrine: Negative.    Genitourinary: Negative.    Musculoskeletal: Positive for arthralgias and myalgias.   Skin: Negative.    Allergic/Immunologic: Negative.    Neurological: Negative.    Psychiatric/Behavioral: Negative.        Objective   Physical Exam   Constitutional: She appears well-developed and well-nourished. No distress.   HENT:   Head: Normocephalic and atraumatic.   Mouth/Throat: Oropharynx is clear and moist.   Eyes: Conjunctivae are normal. Pupils are equal, round, and reactive to light.   Cardiovascular: Normal rate, regular rhythm and normal heart sounds.  Exam reveals no gallop and no friction rub.    No murmur heard.  Pulmonary/Chest: Effort normal and breath sounds normal. No  accessory muscle usage. No respiratory distress. She has no decreased breath sounds. She has no wheezes. She has no rhonchi. She has no rales.   Abdominal: She exhibits no distension. There is no tenderness.   Musculoskeletal: She exhibits edema (LLE 2+ edema, non-pitting, but tender). Tenderness: of LLE distal to the knee, but NO erythema or ecchymoses.   Skin: Skin is warm and dry.       Assessment/Plan   Ghada was seen today for groin pain.    Diagnoses and all orders for this visit:    Lymphadenopathy, inguinal  I have ordered a CBC to check white count and referred her to surgery for further evaluation.  -     Ambulatory Referral to General Surgery  -     CBC & Differential    Mild persistent asthma without complication/  She is doing much better today and her lungs are clear.

## 2018-07-11 ENCOUNTER — HOSPITAL ENCOUNTER (OUTPATIENT)
Dept: ULTRASOUND IMAGING | Facility: HOSPITAL | Age: 83
Discharge: HOME | End: 2018-07-11
Admitting: FAMILY MEDICINE

## 2018-07-11 DIAGNOSIS — R59.0 LYMPHADENOPATHY, INGUINAL: Primary | ICD-10-CM

## 2018-07-11 DIAGNOSIS — R59.0 LYMPHADENOPATHY, INGUINAL: ICD-10-CM

## 2018-07-11 PROCEDURE — 76882 US LMTD JT/FCL EVL NVASC XTR: CPT

## 2018-07-13 ENCOUNTER — OFFICE VISIT (OUTPATIENT)
Dept: ORTHOPEDIC SURGERY | Facility: CLINIC | Age: 83
End: 2018-07-13

## 2018-07-13 VITALS — WEIGHT: 190 LBS | BODY MASS INDEX: 32.44 KG/M2 | HEIGHT: 64 IN | TEMPERATURE: 97.7 F

## 2018-07-13 DIAGNOSIS — M17.12 PRIMARY OSTEOARTHRITIS OF LEFT KNEE: Primary | ICD-10-CM

## 2018-07-13 PROCEDURE — 99213 OFFICE O/P EST LOW 20 MIN: CPT | Performed by: ORTHOPAEDIC SURGERY

## 2018-07-13 NOTE — PROGRESS NOTES
Patient: Ghada Ryan  YOB: 1935 82 y.o. female  Medical Record Number: [id number]    Chief Complaints:   Chief Complaint   Patient presents with   • Left Knee - Follow-up, Pain       History of Present Illness:Ghada Ryan is a 82 y.o. female who presents for follow-up of  Left knee pain. Has has increasing posterior pain for the past month. Is concerned about the increased demand with upcoming Right TKA.     Allergies:   Allergies   Allergen Reactions   • Contrast Dye Hives       Medications:   Current Outpatient Prescriptions   Medication Sig Dispense Refill   • albuterol (PROVENTIL) (2.5 MG/3ML) 0.083% nebulizer solution INHALE 3 MILLILITERS (2.5 MG) BY NEBULIZATION ROUTE EVERY 6 HOURS AS NEEDED     • ASPIRIN 81 PO Take 81 mg by mouth.     • B Complex Vitamins (VITAMIN B COMPLEX PO) Take 1 tablet by mouth Daily.     • Calcium Carb-Cholecalciferol (CALCIUM CARBONATE-VITAMIN D3 PO) Take  by mouth.     • Cetirizine HCl 10 MG capsule Take  by mouth.     • Fluticasone Furoate-Vilanterol (BREO ELLIPTA) 100-25 MCG/INH aerosol powder  Inhale 1 puff Daily.     • levothyroxine (SYNTHROID, LEVOTHROID) 25 MCG tablet Take 25 mcg by mouth.     • losartan (COZAAR) 100 MG tablet TAKE 1 TABLET BY MOUTH EVERY DAY 90 tablet 3   • meloxicam (MOBIC) 15 MG tablet TAKE 1/2 TABLET BY MOUTH TWICE DAILY 30 tablet 0   • omeprazole (priLOSEC) 20 MG capsule Take 20 mg by mouth.     • polyethylene glycol (MIRALAX) packet Take 17 g by mouth Daily. 1 each 0   • sertraline (ZOLOFT) 50 MG tablet TAKE 1 TABLET BY MOUTH EVERY DAY 90 tablet 1   • simvastatin (ZOCOR) 40 MG tablet TAKE 1 TABLET BY MOUTH NIGHTLY 90 tablet 1   • timolol (TIMOPTIC) 0.5 % ophthalmic solution Apply 1 drop to eye 2 (Two) Times a Day.     • vitamin C (ASCORBIC ACID) 500 MG tablet Take 500 mg by mouth Daily.     • albuterol (PROVENTIL HFA;VENTOLIN HFA) 108 (90 BASE) MCG/ACT inhaler Inhale 2 puffs.       No current facility-administered  "medications for this visit.          The following portions of the patient's history were reviewed and updated as appropriate: allergies, current medications, past family history, past medical history, past social history, past surgical history and problem list.    Review of Systems:   A 14 point review of systems was performed. All systems negative except pertinent positives/negative listed in HPI above    Physical Exam:   Vitals:    07/13/18 1423   Temp: 97.7 °F (36.5 °C)   TempSrc: Temporal Artery    Weight: 86.2 kg (190 lb)   Height: 161.3 cm (63.5\")       General: A and O x 3, ASA, NAD    SCLERA:    Normal    DENTITION:   Normal  Knee:  left    ALIGNMENT:     Varus  ,   Patella  tracks  midline    GAIT:    Antalgic    SKIN:    No abnormality    RANGE OF MOTION:   0  -  120   DEG    STRENGTH:   4  / 5    LIGAMENTS:    No varus / valgus instability.   Negative  Lachman.    MENISCUS:     Negative   Beena       DISTAL PULSES:    Paplable    DISTAL SENSATION :   Intact    LYMPHATICS:     No   lymphadenopathy    OTHER:          - Positive   effusion      - Crepitance with ROM       Radiology:  Xrays 3views left knee (ap,lateral, sunrise)  reviewed for evaluation of knee pain demonstratingadvanced varus osteoarthritis with bone on bone articulation, subchondral cysts, and periarticular osteophytes  todays xrays were compared to previous xrays and demonstrate no change    Assessment/Plan:  Recent exacerbation of left knee osteoarthritis with upcoming right total knee replacement.  The plan will be an injection of cortisone into the left knee at the same time as the right knee replacement.  "

## 2018-07-25 ENCOUNTER — PREP FOR SURGERY (OUTPATIENT)
Dept: OTHER | Facility: HOSPITAL | Age: 83
End: 2018-07-25

## 2018-07-25 DIAGNOSIS — M17.11 PRIMARY OSTEOARTHRITIS OF RIGHT KNEE: Primary | ICD-10-CM

## 2018-07-26 RX ORDER — MELOXICAM 15 MG/1
TABLET ORAL
Qty: 30 TABLET | Refills: 3 | Status: SHIPPED | OUTPATIENT
Start: 2018-07-26 | End: 2018-07-31

## 2018-07-31 ENCOUNTER — APPOINTMENT (OUTPATIENT)
Dept: PREADMISSION TESTING | Facility: HOSPITAL | Age: 83
End: 2018-07-31

## 2018-07-31 VITALS
TEMPERATURE: 97.8 F | HEART RATE: 65 BPM | HEIGHT: 63 IN | WEIGHT: 193 LBS | OXYGEN SATURATION: 96 % | RESPIRATION RATE: 16 BRPM | DIASTOLIC BLOOD PRESSURE: 87 MMHG | BODY MASS INDEX: 34.2 KG/M2 | SYSTOLIC BLOOD PRESSURE: 168 MMHG

## 2018-07-31 DIAGNOSIS — M17.11 PRIMARY OSTEOARTHRITIS OF RIGHT KNEE: ICD-10-CM

## 2018-07-31 LAB
ANION GAP SERPL CALCULATED.3IONS-SCNC: 13.4 MMOL/L
BILIRUB UR QL STRIP: NEGATIVE
BUN BLD-MCNC: 14 MG/DL (ref 8–23)
BUN/CREAT SERPL: 18.4 (ref 7–25)
CALCIUM SPEC-SCNC: 9.8 MG/DL (ref 8.6–10.5)
CHLORIDE SERPL-SCNC: 100 MMOL/L (ref 98–107)
CLARITY UR: CLEAR
CO2 SERPL-SCNC: 26.6 MMOL/L (ref 22–29)
COLOR UR: YELLOW
CREAT BLD-MCNC: 0.76 MG/DL (ref 0.57–1)
DEPRECATED RDW RBC AUTO: 48.8 FL (ref 37–54)
ERYTHROCYTE [DISTWIDTH] IN BLOOD BY AUTOMATED COUNT: 14.6 % (ref 11.7–13)
GFR SERPL CREATININE-BSD FRML MDRD: 73 ML/MIN/1.73
GLUCOSE BLD-MCNC: 94 MG/DL (ref 65–99)
GLUCOSE UR STRIP-MCNC: NEGATIVE MG/DL
HCT VFR BLD AUTO: 39 % (ref 35.6–45.5)
HGB BLD-MCNC: 12.3 G/DL (ref 11.9–15.5)
HGB UR QL STRIP.AUTO: NEGATIVE
KETONES UR QL STRIP: NEGATIVE
LEUKOCYTE ESTERASE UR QL STRIP.AUTO: NEGATIVE
MCH RBC QN AUTO: 28.9 PG (ref 26.9–32)
MCHC RBC AUTO-ENTMCNC: 31.5 G/DL (ref 32.4–36.3)
MCV RBC AUTO: 91.5 FL (ref 80.5–98.2)
NITRITE UR QL STRIP: NEGATIVE
PH UR STRIP.AUTO: 7 [PH] (ref 5–8)
PLATELET # BLD AUTO: 253 10*3/MM3 (ref 140–500)
PMV BLD AUTO: 9.4 FL (ref 6–12)
POTASSIUM BLD-SCNC: 4.9 MMOL/L (ref 3.5–5.2)
PROT UR QL STRIP: NEGATIVE
RBC # BLD AUTO: 4.26 10*6/MM3 (ref 3.9–5.2)
SODIUM BLD-SCNC: 140 MMOL/L (ref 136–145)
SP GR UR STRIP: 1.01 (ref 1–1.03)
UROBILINOGEN UR QL STRIP: NORMAL
WBC NRBC COR # BLD: 5.28 10*3/MM3 (ref 4.5–10.7)

## 2018-07-31 PROCEDURE — 36415 COLL VENOUS BLD VENIPUNCTURE: CPT

## 2018-07-31 PROCEDURE — 81003 URINALYSIS AUTO W/O SCOPE: CPT | Performed by: ORTHOPAEDIC SURGERY

## 2018-07-31 PROCEDURE — 85027 COMPLETE CBC AUTOMATED: CPT | Performed by: ORTHOPAEDIC SURGERY

## 2018-07-31 PROCEDURE — 80048 BASIC METABOLIC PNL TOTAL CA: CPT | Performed by: ORTHOPAEDIC SURGERY

## 2018-07-31 RX ORDER — LOSARTAN POTASSIUM 100 MG/1
100 TABLET ORAL EVERY MORNING
COMMUNITY
End: 2019-05-15 | Stop reason: SDUPTHER

## 2018-07-31 RX ORDER — MELOXICAM 15 MG/1
15 TABLET ORAL DAILY
COMMUNITY
End: 2019-01-15 | Stop reason: SDUPTHER

## 2018-07-31 ASSESSMENT — KOOS JR
KOOS JR SCORE: 47.487
KOOS JR SCORE: 16

## 2018-08-02 ENCOUNTER — OFFICE VISIT (OUTPATIENT)
Dept: ORTHOPEDIC SURGERY | Facility: CLINIC | Age: 83
End: 2018-08-02

## 2018-08-02 VITALS
HEIGHT: 63 IN | TEMPERATURE: 97.7 F | SYSTOLIC BLOOD PRESSURE: 140 MMHG | BODY MASS INDEX: 34.2 KG/M2 | DIASTOLIC BLOOD PRESSURE: 80 MMHG | WEIGHT: 193 LBS

## 2018-08-02 DIAGNOSIS — M17.11 PRIMARY OSTEOARTHRITIS OF RIGHT KNEE: Primary | ICD-10-CM

## 2018-08-02 PROCEDURE — 73562 X-RAY EXAM OF KNEE 3: CPT | Performed by: NURSE PRACTITIONER

## 2018-08-02 PROCEDURE — S0260 H&P FOR SURGERY: HCPCS | Performed by: NURSE PRACTITIONER

## 2018-08-02 PROCEDURE — 77077 JOINT SURVEY SINGLE VIEW: CPT | Performed by: ORTHOPAEDIC SURGERY

## 2018-08-02 NOTE — H&P
Patient: Ghada Ryan    Date of Admission: 8/8/18    YOB: 1935    Medical Record Number: [id number]    Admitting Physician: Dr. Demond Forrest    Reason for Admission: End Stage Right Knee OA    History of Present Illness: 82 y.o. female presents with severe end stage knee osteoarthritis which has not been responsive to the full compliment of conservative measures. Despite conservative attempts, there is still severe, constant activity limiting pain. Given the severity of the pain, the patient has elected to proceed with knee replacement.    Allergies:   Allergies   Allergen Reactions   • Contrast Dye Hives         Current Medications:  Scheduled Meds:  PRN Meds:.    PMH:     Past Medical History:   Diagnosis Date   • Anemia    • Anxiety    • Arthritis    • Asthma    • Baker's cyst of knee, left     BEHIND THE LEFT KNEE   • Colon polyps    • Diverticulitis 08/2017   • Diverticulosis    • DVT, lower extremity, distal (CMS/HCC)    • Elevated cholesterol    • GERD (gastroesophageal reflux disease)    • Glaucoma    • Hyperlipidemia    • Hypertension    • Hypothyroidism    • Macular degeneration    • Meniscus degeneration    • Parathyroid disease (CMS/HCC)    • Sleep apnea        PF/Surg/Soc Hx:     Past Surgical History:   Procedure Laterality Date   • BREAST LUMPECTOMY     • COLON RESECTION N/A 8/13/2017    Procedure: Laparoscopic hand assist sigmoid colectomy;  Surgeon: Cullen Munoz MD;  Location: [facility];  Service:    • COLONOSCOPY N/A 7/18/2017    Diverticulosis in the sigmoid colon and in the descending colon, A single erosion in the descending colon, IH. PATH: NO INFLAMMATION, FIBROSIS, ULCERATION, OR GRANULOMA.   • COLONOSCOPY W/ POLYPECTOMY N/A approx 2013    pre cancerous polyps [place]-BENIGN   • THYROIDECTOMY Left 3/15/2018    Procedure: RIGHT AND LEFT SUPERIOR  PARATHYROID ADENOMA RESECTION;  Surgeon: Adamaris Bhandari MD;  Location: [facility];  Service: General  "  • WISDOM TOOTH EXTRACTION Bilateral         Social History     Occupational History   • [employer]     Social History Main Topics   • Smoking status: Never Smoker   • Smokeless tobacco: Never Used   • Alcohol use Yes      Comment: 1-2 DRINKS EVERY SIX MONTHS   • Drug use: No   • Sexual activity: No      Social History     Social History Narrative    Lives in community        Family History   Problem Relation Age of Onset   • Tuberculosis Mother    • Kidney cancer Father    • Malig Hyperthermia Neg Hx          Review of Systems:   A 14 point review of systems was performed, pertinent positives discussed above, all other systems are negative    Physical Exam: 82 y.o. female  Vital Signs :   Vitals:    08/02/18 1418   BP: 140/80   BP Location: Left arm   Patient Position: Sitting   Temp: 97.7 °F (36.5 °C)   TempSrc: Temporal Artery    Weight: 87.5 kg (193 lb)   Height: 160 cm (63\")     General: Alert and Oriented x 3, No acute distress.  Psych: mood and affect appropriate; recent and remote memory intact  Eyes: conjunctiva clear; pupils equally round and reactive, sclera nonicteric  CV: no peripheral edema  Resp: normal respiratory effort  Skin: no rashes or wounds; normal turgor    Xrays:  -3 views (AP, lateral, and sunrise) were ordered and reviewed demonstrating end-stage OA with bone on bone articulation.  -A full length AP xray was ordered today for purposes of operative alignment demonstrating end stage arthritic findings. There are no previous full length films for review    Assessment:  End-stage Right knee osteoarthritis. Conservative measures have failed.      Plan:  The plan is to proceed with Right Total Knee Replacement. The patient voiced understanding of the risks, benefits, and alternative forms of treatment that were discussed with Dr Forrest at the time of scheduling.  Patient is planning on going to [facility] postoperatively after 3 day stay.  She does have a history of DVT so " we will anticoagulate her with Coumadin postoperatively for at least 6 weeks    Rosa Maria Sandoval, APRN  8/2/2018

## 2018-08-05 RX ORDER — POLYETHYLENE GLYCOL 3350 17 G/17G
17 POWDER, FOR SOLUTION ORAL DAILY
Qty: 255 G | Refills: 0 | Status: SHIPPED | OUTPATIENT
Start: 2018-08-05

## 2018-08-08 ENCOUNTER — ANESTHESIA EVENT (OUTPATIENT)
Dept: PERIOP | Facility: HOSPITAL | Age: 83
End: 2018-08-08

## 2018-08-08 ENCOUNTER — APPOINTMENT (OUTPATIENT)
Dept: GENERAL RADIOLOGY | Facility: HOSPITAL | Age: 83
End: 2018-08-08
Attending: ORTHOPAEDIC SURGERY

## 2018-08-08 ENCOUNTER — HOSPITAL ENCOUNTER (INPATIENT)
Facility: HOSPITAL | Age: 83
LOS: 3 days | Discharge: NURSING, REHAB OR LONG-TERM CARE | End: 2018-08-11
Attending: ORTHOPAEDIC SURGERY | Admitting: ORTHOPAEDIC SURGERY

## 2018-08-08 ENCOUNTER — ANESTHESIA (OUTPATIENT)
Dept: PERIOP | Facility: HOSPITAL | Age: 83
End: 2018-08-08

## 2018-08-08 DIAGNOSIS — M17.11 PRIMARY OSTEOARTHRITIS OF RIGHT KNEE: Primary | ICD-10-CM

## 2018-08-08 DIAGNOSIS — R26.2 DIFFICULTY WALKING: ICD-10-CM

## 2018-08-08 PROBLEM — M17.9 OA (OSTEOARTHRITIS) OF KNEE: Status: ACTIVE | Noted: 2018-08-08

## 2018-08-08 LAB
INR PPP: 1.03 (ref 0.9–1.1)
PROTHROMBIN TIME: 13.3 SECONDS (ref 11.7–14.2)

## 2018-08-08 PROCEDURE — G8979 MOBILITY GOAL STATUS: HCPCS

## 2018-08-08 PROCEDURE — G8978 MOBILITY CURRENT STATUS: HCPCS

## 2018-08-08 PROCEDURE — 97110 THERAPEUTIC EXERCISES: CPT

## 2018-08-08 PROCEDURE — 25010000002 ONDANSETRON PER 1 MG: Performed by: ORTHOPAEDIC SURGERY

## 2018-08-08 PROCEDURE — 25010000003 CEFAZOLIN IN DEXTROSE 2-4 GM/100ML-% SOLUTION: Performed by: ORTHOPAEDIC SURGERY

## 2018-08-08 PROCEDURE — 25010000002 HYDROMORPHONE PER 4 MG: Performed by: NURSE ANESTHETIST, CERTIFIED REGISTERED

## 2018-08-08 PROCEDURE — 25010000002 VANCOMYCIN 10 G RECONSTITUTED SOLUTION: Performed by: ORTHOPAEDIC SURGERY

## 2018-08-08 PROCEDURE — 85610 PROTHROMBIN TIME: CPT | Performed by: ORTHOPAEDIC SURGERY

## 2018-08-08 PROCEDURE — C1713 ANCHOR/SCREW BN/BN,TIS/BN: HCPCS | Performed by: ORTHOPAEDIC SURGERY

## 2018-08-08 PROCEDURE — 27447 TOTAL KNEE ARTHROPLASTY: CPT | Performed by: ORTHOPAEDIC SURGERY

## 2018-08-08 PROCEDURE — 25010000002 PROPOFOL 10 MG/ML EMULSION: Performed by: NURSE ANESTHETIST, CERTIFIED REGISTERED

## 2018-08-08 PROCEDURE — 25010000002 METHYLPREDNISOLONE PER 40 MG: Performed by: ORTHOPAEDIC SURGERY

## 2018-08-08 PROCEDURE — 25010000002 DEXAMETHASONE PER 1 MG: Performed by: NURSE ANESTHETIST, CERTIFIED REGISTERED

## 2018-08-08 PROCEDURE — 25010000002 ROPIVACAINE PER 1 MG: Performed by: ANESTHESIOLOGY

## 2018-08-08 PROCEDURE — 25010000002 FENTANYL CITRATE (PF) 100 MCG/2ML SOLUTION: Performed by: NURSE ANESTHETIST, CERTIFIED REGISTERED

## 2018-08-08 PROCEDURE — 0SRC069 REPLACEMENT OF RIGHT KNEE JOINT WITH OXIDIZED ZIRCONIUM ON POLYETHYLENE SYNTHETIC SUBSTITUTE, CEMENTED, OPEN APPROACH: ICD-10-PCS | Performed by: ORTHOPAEDIC SURGERY

## 2018-08-08 PROCEDURE — 73560 X-RAY EXAM OF KNEE 1 OR 2: CPT

## 2018-08-08 PROCEDURE — 25010000002 FENTANYL CITRATE (PF) 100 MCG/2ML SOLUTION: Performed by: ANESTHESIOLOGY

## 2018-08-08 PROCEDURE — C1776 JOINT DEVICE (IMPLANTABLE): HCPCS | Performed by: ORTHOPAEDIC SURGERY

## 2018-08-08 PROCEDURE — 25010000002 ONDANSETRON PER 1 MG: Performed by: NURSE ANESTHETIST, CERTIFIED REGISTERED

## 2018-08-08 PROCEDURE — 97161 PT EVAL LOW COMPLEX 20 MIN: CPT

## 2018-08-08 PROCEDURE — 25010000002 KETOROLAC TROMETHAMINE PER 15 MG: Performed by: ORTHOPAEDIC SURGERY

## 2018-08-08 PROCEDURE — 94799 UNLISTED PULMONARY SVC/PX: CPT

## 2018-08-08 PROCEDURE — 25010000002 MIDAZOLAM PER 1 MG: Performed by: ANESTHESIOLOGY

## 2018-08-08 DEVICE — IMPLANTABLE DEVICE: Type: IMPLANTABLE DEVICE | Site: KNEE | Status: FUNCTIONAL

## 2018-08-08 DEVICE — GEN II 7.5MM RESUR PAT 35MM
Type: IMPLANTABLE DEVICE | Site: KNEE | Status: FUNCTIONAL
Brand: GENESIS II

## 2018-08-08 DEVICE — CMT BONE PALACOS R HI/VISC 1X40: Type: IMPLANTABLE DEVICE | Site: KNEE | Status: FUNCTIONAL

## 2018-08-08 DEVICE — GENESIS II NON-POROUS TIBIAL                                    BASEPLATE SIZE 4 RIGHT
Type: IMPLANTABLE DEVICE | Site: KNEE | Status: FUNCTIONAL
Brand: GENESIS II

## 2018-08-08 DEVICE — LEGION CRUCIATE RETAINING OXINIUM                                    FEMORAL SIZE 4 RIGHT
Type: IMPLANTABLE DEVICE | Site: KNEE | Status: FUNCTIONAL
Brand: LEGION

## 2018-08-08 DEVICE — GENESIS II CR DEEP FLEXION INSERT                                    SZ 3-4 11MM
Type: IMPLANTABLE DEVICE | Site: KNEE | Status: FUNCTIONAL
Brand: GENESIS II

## 2018-08-08 RX ORDER — ROPIVACAINE HYDROCHLORIDE 5 MG/ML
INJECTION, SOLUTION EPIDURAL; INFILTRATION; PERINEURAL AS NEEDED
Status: DISCONTINUED | OUTPATIENT
Start: 2018-08-08 | End: 2018-08-08 | Stop reason: SURG

## 2018-08-08 RX ORDER — ONDANSETRON 4 MG/1
4 TABLET, ORALLY DISINTEGRATING ORAL EVERY 6 HOURS PRN
Status: DISCONTINUED | OUTPATIENT
Start: 2018-08-08 | End: 2018-08-11 | Stop reason: HOSPADM

## 2018-08-08 RX ORDER — KETOROLAC TROMETHAMINE 30 MG/ML
15 INJECTION, SOLUTION INTRAMUSCULAR; INTRAVENOUS EVERY 8 HOURS
Status: COMPLETED | OUTPATIENT
Start: 2018-08-08 | End: 2018-08-09

## 2018-08-08 RX ORDER — MELOXICAM 15 MG/1
15 TABLET ORAL ONCE
Status: COMPLETED | OUTPATIENT
Start: 2018-08-08 | End: 2018-08-08

## 2018-08-08 RX ORDER — WARFARIN SODIUM 5 MG/1
5 TABLET ORAL
Status: COMPLETED | OUTPATIENT
Start: 2018-08-08 | End: 2018-08-08

## 2018-08-08 RX ORDER — METHYLPREDNISOLONE ACETATE 40 MG/ML
INJECTION, SUSPENSION INTRA-ARTICULAR; INTRALESIONAL; INTRAMUSCULAR; SOFT TISSUE AS NEEDED
Status: DISCONTINUED | OUTPATIENT
Start: 2018-08-08 | End: 2018-08-08 | Stop reason: HOSPADM

## 2018-08-08 RX ORDER — UREA 10 %
3 LOTION (ML) TOPICAL NIGHTLY PRN
Status: DISCONTINUED | OUTPATIENT
Start: 2018-08-08 | End: 2018-08-11 | Stop reason: HOSPADM

## 2018-08-08 RX ORDER — MIDAZOLAM HYDROCHLORIDE 1 MG/ML
2 INJECTION INTRAMUSCULAR; INTRAVENOUS
Status: DISCONTINUED | OUTPATIENT
Start: 2018-08-08 | End: 2018-08-08 | Stop reason: HOSPADM

## 2018-08-08 RX ORDER — DEXAMETHASONE SODIUM PHOSPHATE 10 MG/ML
INJECTION INTRAMUSCULAR; INTRAVENOUS AS NEEDED
Status: DISCONTINUED | OUTPATIENT
Start: 2018-08-08 | End: 2018-08-08 | Stop reason: SURG

## 2018-08-08 RX ORDER — FENTANYL CITRATE 50 UG/ML
50 INJECTION, SOLUTION INTRAMUSCULAR; INTRAVENOUS
Status: DISCONTINUED | OUTPATIENT
Start: 2018-08-08 | End: 2018-08-08 | Stop reason: HOSPADM

## 2018-08-08 RX ORDER — EPHEDRINE SULFATE 50 MG/ML
5 INJECTION, SOLUTION INTRAVENOUS ONCE AS NEEDED
Status: DISCONTINUED | OUTPATIENT
Start: 2018-08-08 | End: 2018-08-08 | Stop reason: HOSPADM

## 2018-08-08 RX ORDER — LIDOCAINE HYDROCHLORIDE 20 MG/ML
INJECTION, SOLUTION INFILTRATION; PERINEURAL AS NEEDED
Status: DISCONTINUED | OUTPATIENT
Start: 2018-08-08 | End: 2018-08-08 | Stop reason: SURG

## 2018-08-08 RX ORDER — NALOXONE HCL 0.4 MG/ML
0.2 VIAL (ML) INJECTION AS NEEDED
Status: DISCONTINUED | OUTPATIENT
Start: 2018-08-08 | End: 2018-08-08 | Stop reason: HOSPADM

## 2018-08-08 RX ORDER — HYDROCODONE BITARTRATE AND ACETAMINOPHEN 7.5; 325 MG/1; MG/1
2 TABLET ORAL EVERY 4 HOURS PRN
Status: DISCONTINUED | OUTPATIENT
Start: 2018-08-08 | End: 2018-08-11 | Stop reason: HOSPADM

## 2018-08-08 RX ORDER — FLUMAZENIL 0.1 MG/ML
0.2 INJECTION INTRAVENOUS AS NEEDED
Status: DISCONTINUED | OUTPATIENT
Start: 2018-08-08 | End: 2018-08-08 | Stop reason: HOSPADM

## 2018-08-08 RX ORDER — ACETAMINOPHEN 10 MG/ML
1000 INJECTION, SOLUTION INTRAVENOUS ONCE
Status: COMPLETED | OUTPATIENT
Start: 2018-08-08 | End: 2018-08-08

## 2018-08-08 RX ORDER — HYDROMORPHONE HYDROCHLORIDE 1 MG/ML
0.5 INJECTION, SOLUTION INTRAMUSCULAR; INTRAVENOUS; SUBCUTANEOUS
Status: DISCONTINUED | OUTPATIENT
Start: 2018-08-08 | End: 2018-08-08 | Stop reason: HOSPADM

## 2018-08-08 RX ORDER — MAGNESIUM HYDROXIDE 1200 MG/15ML
LIQUID ORAL AS NEEDED
Status: DISCONTINUED | OUTPATIENT
Start: 2018-08-08 | End: 2018-08-08 | Stop reason: HOSPADM

## 2018-08-08 RX ORDER — ONDANSETRON 4 MG/1
4 TABLET, FILM COATED ORAL EVERY 6 HOURS PRN
Status: DISCONTINUED | OUTPATIENT
Start: 2018-08-08 | End: 2018-08-11 | Stop reason: HOSPADM

## 2018-08-08 RX ORDER — ONDANSETRON 2 MG/ML
INJECTION INTRAMUSCULAR; INTRAVENOUS AS NEEDED
Status: DISCONTINUED | OUTPATIENT
Start: 2018-08-08 | End: 2018-08-08 | Stop reason: SURG

## 2018-08-08 RX ORDER — SODIUM CHLORIDE 0.9 % (FLUSH) 0.9 %
1-10 SYRINGE (ML) INJECTION AS NEEDED
Status: DISCONTINUED | OUTPATIENT
Start: 2018-08-08 | End: 2018-08-08 | Stop reason: HOSPADM

## 2018-08-08 RX ORDER — TIMOLOL MALEATE 5 MG/ML
1 SOLUTION/ DROPS OPHTHALMIC EVERY 12 HOURS SCHEDULED
Status: DISCONTINUED | OUTPATIENT
Start: 2018-08-08 | End: 2018-08-11 | Stop reason: HOSPADM

## 2018-08-08 RX ORDER — FAMOTIDINE 10 MG/ML
20 INJECTION, SOLUTION INTRAVENOUS ONCE
Status: COMPLETED | OUTPATIENT
Start: 2018-08-08 | End: 2018-08-08

## 2018-08-08 RX ORDER — LOSARTAN POTASSIUM 100 MG/1
100 TABLET ORAL EVERY MORNING
Status: DISCONTINUED | OUTPATIENT
Start: 2018-08-08 | End: 2018-08-11 | Stop reason: HOSPADM

## 2018-08-08 RX ORDER — LIDOCAINE HYDROCHLORIDE 10 MG/ML
0.5 INJECTION, SOLUTION EPIDURAL; INFILTRATION; INTRACAUDAL; PERINEURAL ONCE AS NEEDED
Status: DISCONTINUED | OUTPATIENT
Start: 2018-08-08 | End: 2018-08-08 | Stop reason: HOSPADM

## 2018-08-08 RX ORDER — EPHEDRINE SULFATE 50 MG/ML
INJECTION, SOLUTION INTRAVENOUS AS NEEDED
Status: DISCONTINUED | OUTPATIENT
Start: 2018-08-08 | End: 2018-08-08 | Stop reason: SURG

## 2018-08-08 RX ORDER — LABETALOL HYDROCHLORIDE 5 MG/ML
5 INJECTION, SOLUTION INTRAVENOUS
Status: DISCONTINUED | OUTPATIENT
Start: 2018-08-08 | End: 2018-08-08 | Stop reason: HOSPADM

## 2018-08-08 RX ORDER — DIPHENHYDRAMINE HYDROCHLORIDE 50 MG/ML
12.5 INJECTION INTRAMUSCULAR; INTRAVENOUS
Status: DISCONTINUED | OUTPATIENT
Start: 2018-08-08 | End: 2018-08-08 | Stop reason: HOSPADM

## 2018-08-08 RX ORDER — ASPIRIN 325 MG
325 TABLET, DELAYED RELEASE (ENTERIC COATED) ORAL EVERY 12 HOURS SCHEDULED
Status: DISCONTINUED | OUTPATIENT
Start: 2018-08-09 | End: 2018-08-08

## 2018-08-08 RX ORDER — ONDANSETRON 2 MG/ML
4 INJECTION INTRAMUSCULAR; INTRAVENOUS EVERY 6 HOURS PRN
Status: DISCONTINUED | OUTPATIENT
Start: 2018-08-08 | End: 2018-08-11 | Stop reason: HOSPADM

## 2018-08-08 RX ORDER — MELOXICAM 15 MG/1
15 TABLET ORAL DAILY
Status: DISCONTINUED | OUTPATIENT
Start: 2018-08-09 | End: 2018-08-11 | Stop reason: HOSPADM

## 2018-08-08 RX ORDER — ONDANSETRON 2 MG/ML
4 INJECTION INTRAMUSCULAR; INTRAVENOUS ONCE AS NEEDED
Status: DISCONTINUED | OUTPATIENT
Start: 2018-08-08 | End: 2018-08-08 | Stop reason: HOSPADM

## 2018-08-08 RX ORDER — HYDROCODONE BITARTRATE AND ACETAMINOPHEN 7.5; 325 MG/1; MG/1
1 TABLET ORAL EVERY 4 HOURS PRN
Status: DISCONTINUED | OUTPATIENT
Start: 2018-08-08 | End: 2018-08-11 | Stop reason: HOSPADM

## 2018-08-08 RX ORDER — SODIUM CHLORIDE, SODIUM LACTATE, POTASSIUM CHLORIDE, CALCIUM CHLORIDE 600; 310; 30; 20 MG/100ML; MG/100ML; MG/100ML; MG/100ML
9 INJECTION, SOLUTION INTRAVENOUS CONTINUOUS
Status: DISCONTINUED | OUTPATIENT
Start: 2018-08-08 | End: 2018-08-08 | Stop reason: HOSPADM

## 2018-08-08 RX ORDER — CEFAZOLIN SODIUM 2 G/100ML
2 INJECTION, SOLUTION INTRAVENOUS EVERY 8 HOURS
Status: COMPLETED | OUTPATIENT
Start: 2018-08-08 | End: 2018-08-08

## 2018-08-08 RX ORDER — PROPOFOL 10 MG/ML
VIAL (ML) INTRAVENOUS AS NEEDED
Status: DISCONTINUED | OUTPATIENT
Start: 2018-08-08 | End: 2018-08-08 | Stop reason: SURG

## 2018-08-08 RX ORDER — ALBUTEROL SULFATE 2.5 MG/3ML
2.5 SOLUTION RESPIRATORY (INHALATION) EVERY 6 HOURS PRN
Status: DISCONTINUED | OUTPATIENT
Start: 2018-08-08 | End: 2018-08-11 | Stop reason: HOSPADM

## 2018-08-08 RX ORDER — MIDAZOLAM HYDROCHLORIDE 1 MG/ML
1 INJECTION INTRAMUSCULAR; INTRAVENOUS
Status: DISCONTINUED | OUTPATIENT
Start: 2018-08-08 | End: 2018-08-08 | Stop reason: HOSPADM

## 2018-08-08 RX ORDER — PREGABALIN 75 MG/1
150 CAPSULE ORAL ONCE
Status: COMPLETED | OUTPATIENT
Start: 2018-08-08 | End: 2018-08-08

## 2018-08-08 RX ORDER — WARFARIN SODIUM 4 MG/1
4 TABLET ORAL
Status: DISCONTINUED | OUTPATIENT
Start: 2018-08-09 | End: 2018-08-11 | Stop reason: HOSPADM

## 2018-08-08 RX ORDER — BUPIVACAINE HYDROCHLORIDE 5 MG/ML
INJECTION, SOLUTION PERINEURAL AS NEEDED
Status: DISCONTINUED | OUTPATIENT
Start: 2018-08-08 | End: 2018-08-08 | Stop reason: HOSPADM

## 2018-08-08 RX ORDER — CEFAZOLIN SODIUM 2 G/100ML
2 INJECTION, SOLUTION INTRAVENOUS ONCE
Status: COMPLETED | OUTPATIENT
Start: 2018-08-08 | End: 2018-08-08

## 2018-08-08 RX ORDER — ALBUTEROL SULFATE 90 UG/1
2 AEROSOL, METERED RESPIRATORY (INHALATION) EVERY 6 HOURS PRN
Status: DISCONTINUED | OUTPATIENT
Start: 2018-08-08 | End: 2018-08-08

## 2018-08-08 RX ORDER — DOCUSATE SODIUM 100 MG/1
100 CAPSULE, LIQUID FILLED ORAL 2 TIMES DAILY
Status: DISCONTINUED | OUTPATIENT
Start: 2018-08-08 | End: 2018-08-11 | Stop reason: HOSPADM

## 2018-08-08 RX ORDER — ROCURONIUM BROMIDE 10 MG/ML
INJECTION, SOLUTION INTRAVENOUS AS NEEDED
Status: DISCONTINUED | OUTPATIENT
Start: 2018-08-08 | End: 2018-08-08 | Stop reason: SURG

## 2018-08-08 RX ORDER — BUDESONIDE AND FORMOTEROL FUMARATE DIHYDRATE 160; 4.5 UG/1; UG/1
1 AEROSOL RESPIRATORY (INHALATION) DAILY
Status: DISCONTINUED | OUTPATIENT
Start: 2018-08-08 | End: 2018-08-11 | Stop reason: HOSPADM

## 2018-08-08 RX ORDER — LEVOTHYROXINE SODIUM 0.03 MG/1
25 TABLET ORAL DAILY
Status: DISCONTINUED | OUTPATIENT
Start: 2018-08-08 | End: 2018-08-11 | Stop reason: HOSPADM

## 2018-08-08 RX ORDER — FENTANYL CITRATE 50 UG/ML
INJECTION, SOLUTION INTRAMUSCULAR; INTRAVENOUS AS NEEDED
Status: DISCONTINUED | OUTPATIENT
Start: 2018-08-08 | End: 2018-08-08 | Stop reason: SURG

## 2018-08-08 RX ORDER — BISACODYL 10 MG
10 SUPPOSITORY, RECTAL RECTAL DAILY PRN
Status: DISCONTINUED | OUTPATIENT
Start: 2018-08-08 | End: 2018-08-11 | Stop reason: HOSPADM

## 2018-08-08 RX ADMIN — MUPIROCIN 10 APPLICATION: 20 OINTMENT TOPICAL at 20:08

## 2018-08-08 RX ADMIN — DOCUSATE SODIUM 100 MG: 100 CAPSULE, LIQUID FILLED ORAL at 20:08

## 2018-08-08 RX ADMIN — KETOROLAC TROMETHAMINE 15 MG: 30 INJECTION, SOLUTION INTRAMUSCULAR at 17:41

## 2018-08-08 RX ADMIN — ROPIVACAINE HYDROCHLORIDE 30 ML: 5 INJECTION, SOLUTION EPIDURAL; INFILTRATION; PERINEURAL at 07:05

## 2018-08-08 RX ADMIN — VANCOMYCIN HYDROCHLORIDE 1250 MG: 10 INJECTION, POWDER, LYOPHILIZED, FOR SOLUTION INTRAVENOUS at 06:26

## 2018-08-08 RX ADMIN — CEFAZOLIN SODIUM 2 G: 2 INJECTION, SOLUTION INTRAVENOUS at 14:51

## 2018-08-08 RX ADMIN — WARFARIN SODIUM 5 MG: 5 TABLET ORAL at 17:41

## 2018-08-08 RX ADMIN — SUGAMMADEX 175 MG: 100 INJECTION, SOLUTION INTRAVENOUS at 08:47

## 2018-08-08 RX ADMIN — ONDANSETRON 4 MG: 2 INJECTION INTRAMUSCULAR; INTRAVENOUS at 08:30

## 2018-08-08 RX ADMIN — FAMOTIDINE 20 MG: 10 INJECTION INTRAVENOUS at 07:01

## 2018-08-08 RX ADMIN — HYDROMORPHONE HYDROCHLORIDE 0.5 MG: 1 INJECTION, SOLUTION INTRAMUSCULAR; INTRAVENOUS; SUBCUTANEOUS at 09:43

## 2018-08-08 RX ADMIN — ROCURONIUM BROMIDE 30 MG: 10 INJECTION INTRAVENOUS at 07:17

## 2018-08-08 RX ADMIN — MIDAZOLAM 2 MG: 1 INJECTION INTRAMUSCULAR; INTRAVENOUS at 07:01

## 2018-08-08 RX ADMIN — MELOXICAM 15 MG: 15 TABLET ORAL at 06:26

## 2018-08-08 RX ADMIN — SODIUM CHLORIDE, POTASSIUM CHLORIDE, SODIUM LACTATE AND CALCIUM CHLORIDE 500 ML: 600; 310; 30; 20 INJECTION, SOLUTION INTRAVENOUS at 06:26

## 2018-08-08 RX ADMIN — SODIUM CHLORIDE, POTASSIUM CHLORIDE, SODIUM LACTATE AND CALCIUM CHLORIDE: 600; 310; 30; 20 INJECTION, SOLUTION INTRAVENOUS at 07:12

## 2018-08-08 RX ADMIN — EPHEDRINE SULFATE 10 MG: 50 INJECTION INTRAMUSCULAR; INTRAVENOUS; SUBCUTANEOUS at 07:26

## 2018-08-08 RX ADMIN — FENTANYL CITRATE 50 MCG: 50 INJECTION, SOLUTION INTRAMUSCULAR; INTRAVENOUS at 07:02

## 2018-08-08 RX ADMIN — CEFAZOLIN SODIUM 2 G: 2 INJECTION, SOLUTION INTRAVENOUS at 07:13

## 2018-08-08 RX ADMIN — CEFAZOLIN SODIUM 2 G: 2 INJECTION, SOLUTION INTRAVENOUS at 22:40

## 2018-08-08 RX ADMIN — HYDROCODONE BITARTRATE AND ACETAMINOPHEN 1 TABLET: 7.5; 325 TABLET ORAL at 21:01

## 2018-08-08 RX ADMIN — ACETAMINOPHEN 1000 MG: 10 INJECTION, SOLUTION INTRAVENOUS at 07:27

## 2018-08-08 RX ADMIN — DEXAMETHASONE SODIUM PHOSPHATE 8 MG: 10 INJECTION INTRAMUSCULAR; INTRAVENOUS at 07:24

## 2018-08-08 RX ADMIN — EPHEDRINE SULFATE 10 MG: 50 INJECTION INTRAMUSCULAR; INTRAVENOUS; SUBCUTANEOUS at 07:30

## 2018-08-08 RX ADMIN — SODIUM CHLORIDE, POTASSIUM CHLORIDE, SODIUM LACTATE AND CALCIUM CHLORIDE: 600; 310; 30; 20 INJECTION, SOLUTION INTRAVENOUS at 08:21

## 2018-08-08 RX ADMIN — KETOROLAC TROMETHAMINE 15 MG: 30 INJECTION, SOLUTION INTRAMUSCULAR at 09:30

## 2018-08-08 RX ADMIN — FENTANYL CITRATE 100 MCG: 50 INJECTION INTRAMUSCULAR; INTRAVENOUS at 07:49

## 2018-08-08 RX ADMIN — PROPOFOL 150 MG: 10 INJECTION, EMULSION INTRAVENOUS at 07:17

## 2018-08-08 RX ADMIN — SODIUM CHLORIDE, POTASSIUM CHLORIDE, SODIUM LACTATE AND CALCIUM CHLORIDE 9 ML/HR: 600; 310; 30; 20 INJECTION, SOLUTION INTRAVENOUS at 07:02

## 2018-08-08 RX ADMIN — LIDOCAINE HYDROCHLORIDE 60 MG: 20 INJECTION, SOLUTION INFILTRATION; PERINEURAL at 07:17

## 2018-08-08 RX ADMIN — TIMOLOL MALEATE 1 DROP: 5 SOLUTION/ DROPS OPHTHALMIC at 20:08

## 2018-08-08 RX ADMIN — PREGABALIN 150 MG: 75 CAPSULE ORAL at 06:26

## 2018-08-08 RX ADMIN — POLYETHYLENE GLYCOL 3350 17 G: 17 POWDER, FOR SOLUTION ORAL at 20:08

## 2018-08-08 RX ADMIN — ONDANSETRON 4 MG: 2 INJECTION INTRAMUSCULAR; INTRAVENOUS at 14:51

## 2018-08-08 NOTE — PLAN OF CARE
Problem: Patient Care Overview  Goal: Plan of Care Review   08/08/18 1888   OTHER   Outcome Summary Pt is a 82 y.o. female s/p R TKA on 8/8/2018 and is WBAT. Pt presents today with nausea, no pain, decreased activity tolerance, expected impairments in R Knee ROM and Strength, and decreased functional mobility. Pt requires min A for bed mobility, CGA for STS transfers, and CGA for amb with FWW for 5 ft. Pt will benefit from skilled PT to address the previous impairments and improve safety with functional mobility. Anticipate pt will D/C to SNF in 3-4 days.   Coping/Psychosocial   Plan of Care Reviewed With patient

## 2018-08-08 NOTE — ANESTHESIA POSTPROCEDURE EVALUATION
Patient: Ghada Ryan    Procedure Summary     Date:  08/08/18 Room / Location:  [facility] OR [address] [place] [address] MAIN OR    Anesthesia Start:  0712 Anesthesia Stop:  0906    Procedure:  RT TOTAL KNEE ARTHROPLASTY (Right Knee) Diagnosis:       Primary osteoarthritis of right knee      (Primary osteoarthritis of right knee [M17.11])    Surgeon:  Demond Forrest MD Provider:  Chloé Mayen MD    Anesthesia Type:  general, regional ASA Status:  3          Anesthesia Type: general, regional  Last vitals  BP   170/81 (08/08/18 0903)   Temp   36.6 °C (97.8 °F) (08/08/18 0903)   Pulse   69 (08/08/18 0903)   Resp   12 (08/08/18 0903)     SpO2   (!) 4 % (08/08/18 0903)     Post Anesthesia Care and Evaluation    Patient location during evaluation: bedside  Patient participation: complete - patient participated  Level of consciousness: sleepy but conscious  Pain score: 0  Pain management: adequate  Airway patency: patent  Anesthetic complications: No anesthetic complications    Cardiovascular status: acceptable  Respiratory status: acceptable  Hydration status: acceptable    Comments: /81 (BP Location: Right arm, Patient Position: Lying)   Pulse 69   Temp 36.6 °C (97.8 °F) (Oral)   Resp 12   SpO2 (!) 4%

## 2018-08-08 NOTE — H&P (VIEW-ONLY)
Patient: Ghada Ryan    Date of Admission: 8/8/18    YOB: 1935    Medical Record Number: [id number]    Admitting Physician: Dr. Demond Forrest    Reason for Admission: End Stage Right Knee OA    History of Present Illness: 82 y.o. female presents with severe end stage knee osteoarthritis which has not been responsive to the full compliment of conservative measures. Despite conservative attempts, there is still severe, constant activity limiting pain. Given the severity of the pain, the patient has elected to proceed with knee replacement.    Allergies:   Allergies   Allergen Reactions   • Contrast Dye Hives         Current Medications:  Scheduled Meds:  PRN Meds:.    PMH:     Past Medical History:   Diagnosis Date   • Anemia    • Anxiety    • Arthritis    • Asthma    • Baker's cyst of knee, left     BEHIND THE LEFT KNEE   • Colon polyps    • Diverticulitis 08/2017   • Diverticulosis    • DVT, lower extremity, distal (CMS/HCC)    • Elevated cholesterol    • GERD (gastroesophageal reflux disease)    • Glaucoma    • Hyperlipidemia    • Hypertension    • Hypothyroidism    • Macular degeneration    • Meniscus degeneration    • Parathyroid disease (CMS/HCC)    • Sleep apnea        PF/Surg/Soc Hx:     Past Surgical History:   Procedure Laterality Date   • BREAST LUMPECTOMY     • COLON RESECTION N/A 8/13/2017    Procedure: Laparoscopic hand assist sigmoid colectomy;  Surgeon: Cullen Munoz MD;  Location: [facility];  Service:    • COLONOSCOPY N/A 7/18/2017    Diverticulosis in the sigmoid colon and in the descending colon, A single erosion in the descending colon, IH. PATH: NO INFLAMMATION, FIBROSIS, ULCERATION, OR GRANULOMA.   • COLONOSCOPY W/ POLYPECTOMY N/A approx 2013    pre cancerous polyps [place]-BENIGN   • THYROIDECTOMY Left 3/15/2018    Procedure: RIGHT AND LEFT SUPERIOR  PARATHYROID ADENOMA RESECTION;  Surgeon: Adamaris Bhandari MD;  Location: [facility];  Service: General  "  • WISDOM TOOTH EXTRACTION Bilateral         Social History     Occupational History   • [employer]     Social History Main Topics   • Smoking status: Never Smoker   • Smokeless tobacco: Never Used   • Alcohol use Yes      Comment: 1-2 DRINKS EVERY SIX MONTHS   • Drug use: No   • Sexual activity: No      Social History     Social History Narrative    Lives in community        Family History   Problem Relation Age of Onset   • Tuberculosis Mother    • Kidney cancer Father    • Malig Hyperthermia Neg Hx          Review of Systems:   A 14 point review of systems was performed, pertinent positives discussed above, all other systems are negative    Physical Exam: 82 y.o. female  Vital Signs :   Vitals:    08/02/18 1418   BP: 140/80   BP Location: Left arm   Patient Position: Sitting   Temp: 97.7 °F (36.5 °C)   TempSrc: Temporal Artery    Weight: 87.5 kg (193 lb)   Height: 160 cm (63\")     General: Alert and Oriented x 3, No acute distress.  Psych: mood and affect appropriate; recent and remote memory intact  Eyes: conjunctiva clear; pupils equally round and reactive, sclera nonicteric  CV: no peripheral edema  Resp: normal respiratory effort  Skin: no rashes or wounds; normal turgor    Xrays:  -3 views (AP, lateral, and sunrise) were ordered and reviewed demonstrating end-stage OA with bone on bone articulation.  -A full length AP xray was ordered today for purposes of operative alignment demonstrating end stage arthritic findings. There are no previous full length films for review    Assessment:  End-stage Right knee osteoarthritis. Conservative measures have failed.      Plan:  The plan is to proceed with Right Total Knee Replacement. The patient voiced understanding of the risks, benefits, and alternative forms of treatment that were discussed with Dr Forrest at the time of scheduling.  Patient is planning on going to [facility] postoperatively after 3 day stay.  She does have a history of DVT so " we will anticoagulate her with Coumadin postoperatively for at least 6 weeks    Rosa Maria Sandoval, APRN  8/2/2018

## 2018-08-08 NOTE — THERAPY EVALUATION
Acute Care - Physical Therapy Initial Evaluation  [facility]     Patient Name: Ghada Ryan  : 1935  MRN: [id number]  Today's Date: 2018   Onset of Illness/Injury or Date of Surgery: 18  Date of Referral to PT: 18  Referring Physician: Demond Forrest MD      Admit Date: 2018    Visit Dx:     ICD-10-CM ICD-9-CM   1. Primary osteoarthritis of right knee M17.11 715.16   2. Difficulty walking R26.2 719.7     Patient Active Problem List   Diagnosis   • DJD (degenerative joint disease), ankle and foot   • Primary osteoarthritis of both knees   • GERD (gastroesophageal reflux disease)   • Sleep apnea   • Asthma   • Hypertension   • Hyperlipidemia   • Hypothyroidism, acquired   • Pulmonary nodule, right, followed by Dr Fung   • Tubular adenoma of colon   • Vitamin D insufficiency   • Anemia   • Anxiety   • Primary osteoarthritis of right knee   • Benign essential hypertension   • OA (osteoarthritis) of knee     Past Medical History:   Diagnosis Date   • Anemia    • Anxiety    • Arthritis    • Asthma    • Baker's cyst of knee, left     BEHIND THE LEFT KNEE   • Colon polyps    • Diverticulitis 2017   • Diverticulosis    • DVT, lower extremity, distal (CMS/HCC)    • Elevated cholesterol    • GERD (gastroesophageal reflux disease)    • Glaucoma    • Hyperlipidemia    • Hypertension    • Hypothyroidism    • Macular degeneration    • Meniscus degeneration    • Parathyroid disease (CMS/HCC)    • Sleep apnea      Past Surgical History:   Procedure Laterality Date   • BREAST LUMPECTOMY     • COLON RESECTION N/A 2017    Procedure: Laparoscopic hand assist sigmoid colectomy;  Surgeon: Cullen Munoz MD;  Location: [facility];  Service:    • COLONOSCOPY N/A 2017    Diverticulosis in the sigmoid colon and in the descending colon, A single erosion in the descending colon, IH. PATH: NO INFLAMMATION, FIBROSIS, ULCERATION, OR GRANULOMA.   • COLONOSCOPY W/ POLYPECTOMY  N/A approx 2013    pre cancerous polyps [place]-BENIGN   • THYROIDECTOMY Left 3/15/2018    Procedure: RIGHT AND LEFT SUPERIOR  PARATHYROID ADENOMA RESECTION;  Surgeon: Adamaris Bhandari MD;  [place] [facility] [place] [facility] [place]: General   • WISDOM TOOTH EXTRACTION Bilateral         PT ASSESSMENT (last 12 hours)      Physical Therapy Evaluation     Row Name 08/08/18 1426          PT Evaluation Time/Intention    Subjective Information complains of;nausea/vomiting  -CA     Document Type evaluation  -CA     Mode of Treatment individual therapy;physical therapy  -CA     Patient Effort good  -CA     Row Name 08/08/18 1426          General Information    Patient Profile Reviewed? yes  -CA     Onset of Illness/Injury or Date of Surgery 08/08/18  -CA     Referring Physician Demond Forrest MD  -CA     Patient Observations cooperative;agree to therapy  -CA     Patient/Family Observations pt supine in bed, no acute distress, vss, lethargic throughout  -CA     Pertinent History of Current Functional Problem s/p TKA on 8/8  -CA     Existing Precautions/Restrictions fall  -CA     Benefits Reviewed patient:;improve function;increase independence;increase strength;increase balance;decrease pain  -CA     Barriers to Rehab none identified  -CA     Row Name 08/08/18 1426          Relationship/Environment    Lives With facility resident  -CA     Row Name 08/08/18 1426          Resource/Environmental Concerns    Current Living Arrangements home/apartment/condo  -CA     Row Name 08/08/18 1426          Cognitive Assessment/Intervention- PT/OT    Orientation Status (Cognition) oriented x 3  -CA     Follows Commands (Cognition) WFL  -CA     Personal Safety Interventions fall prevention program maintained;gait belt;nonskid shoes/slippers when out of bed  -CA     Row Name 08/08/18 1426          Mobility Assessment/Treatment    Extremity Weight-bearing Status right lower extremity  -CA     Right Lower Extremity (Weight-bearing Status) weight-bearing  as tolerated (WBAT)  -CA     Row Name 08/08/18 1426          Bed Mobility Assessment/Treatment    Bed Mobility Assessment/Treatment supine-sit  -CA     Supine-Sit [place] (Bed Mobility) minimum assist (75% patient effort);verbal cues  -CA     Bed Mobility, Safety Issues decreased use of legs for bridging/pushing  -CA     Assistive Device (Bed Mobility) bed rails;head of bed elevated  -CA     Row Name 08/08/18 1426          Transfer Assessment/Treatment    Transfer Assessment/Treatment sit-stand transfer;stand-sit transfer;toilet transfer  -CA     Sit-Stand [place] (Transfers) contact guard;verbal cues  -CA     Stand-Sit [place] (Transfers) contact guard;verbal cues  -CA     [place] Level (Toilet Transfer) contact guard;verbal cues  -CA     Assistive Device (Toilet Transfer) commode, 3-in-1  -CA     Row Name 08/08/18 1426          Sit-Stand Transfer    Assistive Device (Sit-Stand Transfers) walker, front-wheeled  -CA     Row Name 08/08/18 1426          Stand-Sit Transfer    Assistive Device (Stand-Sit Transfers) walker, front-wheeled  -CA     Row Name 08/08/18 1426          Toilet Transfer    Type (Toilet Transfer) sit-stand;stand-sit  -CA     Row Name 08/08/18 1426          Gait/Stairs Assessment/Training    Gait/Stairs Assessment/Training gait/ambulation independence  -CA     [place] Level (Gait) contact guard;verbal cues  -CA     Assistive Device (Gait) walker, front-wheeled  -CA     Distance in Feet (Gait) 5  -CA     Pattern (Gait) step-to  -CA     Deviations/Abnormal Patterns (Gait) gait speed decreased;[place] decreased  -CA     Bilateral Gait Deviations weight shift ability decreased;heel strike decreased  -CA     Comment (Gait/Stairs) Requires verbal cues for walker sequencing  -CA     Row Name 08/08/18 1426          General ROM    GENERAL ROM COMMENTS grossly wfl; exception R Knee limited secondary to surgery  -CA     Row Name 08/08/18 1426          General Assessment (Manual Muscle  Testing)    Comment, General Manual Muscle Testing (MMT) Assessment grossly wfl   -CA     Row Name 08/08/18 1426          Motor Assessment/Intervention    Additional Documentation Therapeutic Exercise Interventions (Group)  -CA     Row Name 08/08/18 1426          Therapeutic Exercise    Sets/Reps (Therapeutic Exercise) x10 reps ea.  -CA     Comment (Therapeutic Exercise) TKA protocol  -CA     Row Name 08/08/18 1426          Sensory Assessment/Intervention    Sensory General Assessment no sensation deficits identified  -CA     Row Name 08/08/18 1426          Pain Assessment    Additional Documentation Pain Scale: Numbers Pre/Post-Treatment (Group)  -CA     Row Name 08/08/18 1426          Pain Scale: Numbers Pre/Post-Treatment    Pain Scale: Numbers, Pretreatment 0/10 - no pain  -CA     Pain Scale: Numbers, Post-Treatment 0/10 - no pain  -CA     Row Name             Wound 08/08/18 0805 Right knee incision    Wound - Properties Group Date first assessed: 08/08/18  -RG Time first assessed: 0805  -RG Side: Right  -RG Location: knee  -RG Type: incision  -RG    Row Name 08/08/18 1426          Physical Therapy Clinical Impression    Date of Referral to PT 08/08/18  -CA     PT Diagnosis (PT Clinical Impression) difficulty walking  -CA     Prognosis (PT Clinical Impression) excellent  -CA     Functional Level at Time of Evaluation (PT Clinical Impression) CGA  -CA     Patient/Family Goals Statement (PT Clinical Impression) To improve independence  -CA     Criteria for Skilled Interventions Met (PT Clinical Impression) yes;treatment indicated  -CA     Pathology/Pathophysiology Noted (Describe Specifically for Each System) musculoskeletal  -CA     Impairments Found (describe specific impairments) aerobic capacity/endurance;gait, locomotion, and balance;muscle performance;ROM  -CA     Functional Limitations in Following Categories (Describe Specific Limitations) self-care;home management;community/leisure  -CA     Rehab  Potential (PT Clinical Summary) good, to achieve stated therapy goals  -CA     Predicted Duration of Therapy (PT) 3-5 days  -CA     Care Plan Review (PT) evaluation/treatment results reviewed  -CA     Row Name 08/08/18 1426          Physical Therapy Goals    Bed Mobility Goal Selection (PT) bed mobility, PT goal 1  -CA     Transfer Goal Selection (PT) transfer, PT goal 1  -CA     Gait Training Goal Selection (PT) gait training, PT goal 1  -CA     Row Name 08/08/18 1426          Bed Mobility Goal 1 (PT)    Activity/Assistive Device (Bed Mobility Goal 1, PT) bed mobility activities, all  -CA     [place] Level/Cues Needed (Bed Mobility Goal 1, PT) contact guard assist  -CA     Time Frame (Bed Mobility Goal 1, PT) 5 days  -CA     Row Name 08/08/18 1426          Transfer Goal 1 (PT)    Activity/Assistive Device (Transfer Goal 1, PT) transfers, all  -CA     [place] Level/Cues Needed (Transfer Goal 1, PT) standby assist  -CA     Time Frame (Transfer Goal 1, PT) 5 days  -CA     Row Name 08/08/18 1426          Gait Training Goal 1 (PT)    Activity/Assistive Device (Gait Training Goal 1, PT) gait (walking locomotion);increase endurance/gait distance;improve balance and speed  -CA     [place] Level (Gait Training Goal 1, PT) contact guard assist  -CA     Distance (Gait Goal 1, PT) 100  -CA     Time Frame (Gait Training Goal 1, PT) 5 days  -CA     Row Name 08/08/18 1426          Positioning and Restraints    Pre-Treatment Position in bed  -CA     Post Treatment Position chair  -CA     In Chair notified nsg;reclined;call light within reach;encouraged to call for assist;RLE elevated  -CA       User Key  (r) = Recorded By, (t) = Taken By, (c) = Cosigned By    Initials Name Provider Type    Leonela Washington, RN Registered Nurse    Gina Hylton, PT Physical Therapist          Physical Therapy Education     Title: PT OT SLP Therapies (Done)     Topic: Physical Therapy (Done)     Point: Mobility training  (Done)    Learning Progress Summary     Learner Status Readiness Method Response Comment Documented by    Patient Done Acceptance [facility] 08/08/18 1433          Point: Home exercise program (Done)    Learning Progress Summary     Learner Status Readiness Method Response Comment Documented by    Patient Done Acceptance [facility] 08/08/18 1433          Point: Body mechanics (Done)    Learning Progress Summary     Learner Status Readiness Method Response Comment Documented by    Patient Done Acceptance [facility] 08/08/18 1433          Point: Precautions (Done)    Learning Progress Summary     Learner Status Readiness Method Response Comment Documented by    Patient Done Acceptance [facility] 08/08/18 1433                      User Key     Initials Effective Dates Name Provider Type Discipline    CA 06/13/18 -  Gina Guy, PT Physical Therapist PT                PT Recommendation and Plan  Anticipated Discharge Disposition (PT): skilled nursing facility  Planned Therapy Interventions (PT Eval): balance training, bed mobility training, gait training, home exercise program, manual therapy techniques, patient/family education, ROM (range of motion), stair training, strengthening, transfer training, stretching, neuromuscular re-education  Therapy Frequency (PT Clinical Impression): 2 times/day  Outcome Summary/Treatment Plan (PT)  Anticipated Discharge Disposition (PT): skilled nursing facility  Plan of Care Reviewed With: patient  Outcome Summary: Pt is a 82 y.o. female s/p R TKA on 8/8/2018 and is WBAT. Pt presents today with nausea, no pain, decreased activity tolerance, expected impairments in R Knee ROM and Strength, and decreased functional mobility. Pt requires min A for bed mobility, CGA for STS transfers, and CGA for amb with FWW for 5 ft. Pt will benefit from skilled PT to address the previous impairments and improve safety with functional mobility. Anticipate pt will D/C to SNF in 3-4 days.          Outcome  Measures     Row Name 08/08/18 1400             How much help from another person do you currently need...    Turning from your back to your side while in flat bed without using bedrails? 3  -CA      Moving from lying on back to sitting on the side of a flat bed without bedrails? 3  -CA      Moving to and from a bed to a chair (including a wheelchair)? 3  -CA      Standing up from a chair using your arms (e.g., wheelchair, bedside chair)? 3  -CA      Climbing 3-5 steps with a railing? 1  -CA      To walk in hospital room? 3  -CA      AM-PAC 6 Clicks Score 16  -CA         Functional Assessment    Outcome Measure Options AM-PAC 6 Clicks Basic Mobility (PT)  -CA        User Key  (r) = Recorded By, (t) = Taken By, (c) = Cosigned By    Initials Name Provider Type    Gina Hylton, JOI Physical Therapist           Time Calculation:         PT Charges     Row Name 08/08/18 1436             Time Calculation    Start Time 1402  -CA      Stop Time 1436  -CA      Time Calculation (min) 34 min  -CA      PT Received On 08/08/18  -CA      PT - Next Appointment 08/09/18  -CA      PT Goal Re-Cert Due Date 08/13/18  -CA         Time Calculation- PT    Total Timed Code Minutes- PT 15 minute(s)  -CA        User Key  (r) = Recorded By, (t) = Taken By, (c) = Cosigned By    Initials Name Provider Type    Gina Hylton PT Physical Therapist        Therapy Suggested Charges     Code   Minutes Charges    None           Therapy Charges for Today     Code Description Service Date Service Provider Modifiers Qty    63515998142 HC PT EVAL LOW COMPLEXITY 1 8/8/2018 Gina Guy, PT GP 1    02328025826 HC PT THER PROC EA 15 MIN 8/8/2018 Gina Guy, PT GP 1    89208751498 HC PT MOBILITY CURRENT 8/8/2018 Gina Guy, PT GP, CK 1    76767628191 HC PT MOBILITY PROJECTED 8/8/2018 Gina Guy, PT GP, CJ 1          PT G-Codes  PT Professional Judgement Used?: Yes  Outcome Measure Options: AM-PAC 6 Clicks Basic Mobility  (PT)  Score: 16  Functional Limitation: Mobility: Walking and moving around  Mobility: Walking and Moving Around Current Status (): At least 40 percent but less than 60 percent impaired, limited or restricted  Mobility: Walking and Moving Around Goal Status (): At least 20 percent but less than 40 percent impaired, limited or restricted      Gina Guy, PT  8/8/2018

## 2018-08-08 NOTE — ANESTHESIA PROCEDURE NOTES
Airway  Urgency: elective    Airway not difficult    General Information and Staff    Patient location during procedure: OR  Anesthesiologist: ASHANTI JENSEN  CRNA: TIP STONE    Indications and Patient Condition  Indications for airway management: airway protection    Preoxygenated: yes  MILS maintained throughout  Mask difficulty assessment: 1 - vent by mask    Final Airway Details  Final airway type: endotracheal airway      Successful airway: ETT  Cuffed: yes   Successful intubation technique: direct laryngoscopy  Facilitating devices/methods: anterior pressure/BURP  Endotracheal tube insertion site: oral  Blade: Oswald  Blade size: #2  ETT size: 7.0 mm  Cormack-Lehane Classification: grade I - full view of glottis  Placement verified by: chest auscultation and capnometry   Cuff volume (mL): 8  Measured from: lips  ETT to lips (cm): 21  Number of attempts at approach: 1    Additional Comments  Pt preoxygenated, SIVI, bag mask vent, ATETI, dentition as before

## 2018-08-08 NOTE — DISCHARGE PLACEMENT REQUEST
"Ghada Bolton (82 y.o. Female)     Date of Birth Social Security Number Address Home Phone MRN    1935  [address]  [facility] [address] [phone] [id number]    Jewish Marital Status          Episcopalian Single       Admission Date Admission Type Admitting Provider Attending Provider Department, Room/Bed    8/8/18 Elective Demond Forrest MD Brown, Reid B, MD [address], P776/1    Discharge Date Discharge Disposition Discharge Destination                       Attending Provider:  Demond Forrest MD    Allergies:  Contrast Dye    Isolation:  None   Infection:  None   Code Status:  CPR    Ht:  161.3 cm (63.5\")   Wt:  87.5 kg (192 lb 14.4 oz)    Admission Cmt:  None   Principal Problem:  Primary osteoarthritis of right knee [M17.11] More...                 Active Insurance as of 8/8/2018     Primary Coverage     Payor Plan Insurance Group Employer/Plan Group    MEDICARE MEDICARE A & B      Payor Plan Address Payor Plan Phone Number Effective From Effective To    [address] [phone] 10/1/2000     [facility] [id number]       Subscriber Name Subscriber Birth Date Member ID       GHADA BOLTON 1935 [id number]                 Emergency Contacts      (Rel.) Home Phone Work Phone Mobile Phone    En,Mother (Friend) [phone] -- [phone]              "

## 2018-08-08 NOTE — ANESTHESIA PROCEDURE NOTES
Peripheral Block    Patient location during procedure: holding area  Start time: 8/8/2018 6:55 AM  Stop time: 8/8/2018 7:06 AM  Reason for block: at surgeon's request and post-op pain management  Performed by  Anesthesiologist: ASHANTI JENSEN  Preanesthetic Checklist  Completed: patient identified, site marked, pre-op evaluation, timeout performed, IV checked, risks and benefits discussed and monitors and equipment checked  Prep:  Pt Position: supine  Sterile barriers:gloves and sterile barriers  Prep: ChloraPrep  Patient monitoring: blood pressure monitoring, continuous pulse oximetry and EKG  Procedure  Sedation:yes  Performed under: PNB  Guidance:ultrasound guided and landmark technique  Images:still images obtained    Laterality:right  Block Type:adductor canal block  Injection Technique:single-shot  Needle Type:echogenic  Needle Gauge:20 G  Resistance on Injection: none  Medications  Local Injected:ropivacaine 0.5% Local Amount Injected:30mL

## 2018-08-08 NOTE — OP NOTE
Name: Ghada Ryan  YOB: 1935    DATE OF SURGERY: 8/8/2018    PREOPERATIVE DIAGNOSIS: Right knee end-stage osteoarthritis    POSTOPERATIVE DIAGNOSIS: Right knee end-stage osteoarthritis    PROCEDURE PERFORMED: Right total knee replacement    SURGEON: Demond Forrest M.D.    ASSISTANT: YURI MCGRAW    IMPLANTS: [employer]:     Implant Name Type Inv. Item Serial No.  Lot No. LRB No. Used   CMT BONE PALACOS R HI/VISC 1X40 - [id number] Implant CMT BONE PALACOS R HI/VISC 1X40  [facility] [id number] Right 1   COMP FEM LEGION OXINIUM CR SZ4 RT - [id number] Implant COMP FEM LEGION OXINIUM CR SZ4 RT  [employer] [id number] Right 1   BASE TIB GEN2 NONPOR TI SZ4 RT - [id number] Implant BASE TIB GEN2 NONPOR TI SZ4 RT  [employer] [id number] Right 1   PATELLA RESRF GEN2 7.5X35MM - [id number] Implant PATELLA RESRF GEN2 7.5X35MM  [employer] [id number] Right 1   INSRT KN CR FLX DEEP GEN2 SZ3 4 11MM - [id number] Implant INSRT KN CR FLX DEEP GEN2 SZ3 4 11MM   [employer] [id number] Right 1       Estimated Blood Loss: 200cc  Specimens : none  Complications: none    DESCRIPTION OF PROCEDURE: The patient was taken to the operating room and placed in the supine position. A sequential compression device was carefully placed on the non-operative leg. Preoperative antibiotics were administered. Surgical time out was performed. After adequate induction of anesthesia, the leg was prepped and draped in the usual sterile fashion, exsanguinated with an Esmarch bandage and the tourniquet inflated to 250 mmHg. A midline incision was performed followed by a medial parapatellar arthrotomy. The patella was subluxed laterally.  A portion of the fat pad, ACL, and anterior horns of the meniscus were excised. The drill hole was placed in the distal femur and the canal was the irrigated and suctioned. The IM braydon was placed and a 5 degree distal valgus cut was performed on the femur.  The femur was then sized with a sizing guide. The femoral cutting block was placed and all femoral cuts were performed. The proximal tibia was exposed. We used the extramedullary tibial cutting guide set for removal of 9mm of bone off the high side. The tibial cut was performed. The posterior horns of the menisci were excised. The posterior osteophytes were removed. Flexion extension blocks were then used to balance the knee. The tibial cut surface was then sized with the sizing templates and the tibial and femoral trial were then placed. The knee was placed in full extension and then the tibial tray rotation was then matched to the femoral rotation and marked.    Attention was then placed to the patella. The patella was noted to track centrally through range of motion. The patella was then sized with the trials. The thickness of the patella was then measured. The patella was resurfaced and the surrounding osteophytes were removed. The preoperative thickness was reproduced. The patella tracked centrally through range of motion.   At this point all trial components were removed, the knee was copiously irrigated with pulsed lavage, and the knee was injected with anesthetic cocktail solution. The cut surfaces were then dried with clean lap sponges, and the components were cemented tibia, followed by femur, then patella. The knee was held in full extension and all excess cement was removed. The knee was held still until the cement had completely hardened. We then placed the trial polyethylene spacer which resulted in full extension and excellent flexion-extension balance. We placed the final polyethylene spacer.   The knee was then copiously irrigated. The tourniquet was then released. There was excellent hemostasis. We placed a one-eighth inch Hemovac drain. We closed the knee in multiple layers in standard fashion. Sterile dressing were applied. At the end of the case, the sponge and needle counts were reported as  being correct. There were no known complications. The patient was then transported to the recovery room.      Demond Forrest M.D.

## 2018-08-08 NOTE — ANESTHESIA PREPROCEDURE EVALUATION
Anesthesia Evaluation     Patient summary reviewed and Nursing notes reviewed   no history of anesthetic complications:  NPO Solid Status: > 8 hours  NPO Liquid Status: < 2 hours           Airway   Mallampati: II  TM distance: <3 FB  Neck ROM: full  Possible difficult intubation  Dental - normal exam     Pulmonary - normal exam    breath sounds clear to auscultation  (+) asthma, sleep apnea on CPAP,   Cardiovascular - normal exam    ECG reviewed  Rhythm: regular  Rate: normal    (+) hypertension, DVT, hyperlipidemia,   (-) PVD      Neuro/Psych  (+) psychiatric history Anxiety,     GI/Hepatic/Renal/Endo    (+) obesity,  GERD,  hypothyroidism,     ROS Comment: S/p colon resection    Musculoskeletal     Abdominal   (+) obese,    Substance History - negative use     OB/GYN negative ob/gyn ROS         Other   (+) arthritis                     Anesthesia Plan    ASA 3     general and regional   (R adductor canal block)  intravenous induction   Anesthetic plan and risks discussed with patient and healthcare power of .

## 2018-08-08 NOTE — PLAN OF CARE
Problem: Patient Care Overview  Goal: Plan of Care Review  Outcome: Ongoing (interventions implemented as appropriate)   08/08/18 1529   OTHER   Outcome Summary Postop RTKA. Vitals stable. Ace wrap in place over DORY dressing. Patient remains very drowsy. Up to chair and worked with PT. Minimal c/o pain. Pt c/o postop nausea/vomiting x1, medicated with IV zofran, with relief. Hx htn, discussed BP monitoring with patient, will continue to monitor. Plan for rehab upon DC.   Coping/Psychosocial   Plan of Care Reviewed With patient   Plan of Care Review   Progress improving     Goal: Individualization and Mutuality  Outcome: Ongoing (interventions implemented as appropriate)    Goal: Discharge Needs Assessment  Outcome: Ongoing (interventions implemented as appropriate)    Goal: Interprofessional Rounds/Family Conf  Outcome: Ongoing (interventions implemented as appropriate)      Problem: Fall Risk (Adult)  Goal: Identify Related Risk Factors and Signs and Symptoms  Outcome: Outcome(s) achieved Date Met: 08/08/18 08/08/18 1529   Fall Risk (Adult)   Related Risk Factors (Fall Risk) age-related changes;culprit medication(s);gait/mobility problems;environment unfamiliar   Signs and Symptoms (Fall Risk) presence of risk factors     Goal: Absence of Fall  Outcome: Ongoing (interventions implemented as appropriate)   08/08/18 1529   Fall Risk (Adult)   Absence of Fall achieves outcome       Problem: Knee Arthroplasty (Total, Partial) (Adult)  Goal: Signs and Symptoms of Listed Potential Problems Will be Absent, Minimized or Managed (Knee Arthroplasty)  Outcome: Ongoing (interventions implemented as appropriate)   08/08/18 1529   Goal/Outcome Evaluation   Problems Assessed (Knee Arthroplasty) all   Problems Present (Knee Arthroplasty) pain;postoperative nausea and vomiting;range of motion decreased     Goal: Anesthesia/Sedation Recovery  Outcome: Ongoing (interventions implemented as appropriate)   08/08/18 1529    Goal/Outcome Evaluation   Anesthesia/Sedation Recovery progressing toward baseline

## 2018-08-09 LAB
HCT VFR BLD AUTO: 33.8 % (ref 35.6–45.5)
HGB BLD-MCNC: 10.8 G/DL (ref 11.9–15.5)
INR PPP: 1.1 (ref 0.9–1.1)
PROTHROMBIN TIME: 14 SECONDS (ref 11.7–14.2)

## 2018-08-09 PROCEDURE — 99024 POSTOP FOLLOW-UP VISIT: CPT | Performed by: NURSE PRACTITIONER

## 2018-08-09 PROCEDURE — 94799 UNLISTED PULMONARY SVC/PX: CPT

## 2018-08-09 PROCEDURE — 85610 PROTHROMBIN TIME: CPT | Performed by: ORTHOPAEDIC SURGERY

## 2018-08-09 PROCEDURE — 25010000002 KETOROLAC TROMETHAMINE PER 15 MG: Performed by: ORTHOPAEDIC SURGERY

## 2018-08-09 PROCEDURE — 85018 HEMOGLOBIN: CPT | Performed by: ORTHOPAEDIC SURGERY

## 2018-08-09 PROCEDURE — 97150 GROUP THERAPEUTIC PROCEDURES: CPT

## 2018-08-09 PROCEDURE — 97110 THERAPEUTIC EXERCISES: CPT

## 2018-08-09 PROCEDURE — 85014 HEMATOCRIT: CPT | Performed by: ORTHOPAEDIC SURGERY

## 2018-08-09 RX ADMIN — POLYETHYLENE GLYCOL 3350 17 G: 17 POWDER, FOR SOLUTION ORAL at 08:06

## 2018-08-09 RX ADMIN — KETOROLAC TROMETHAMINE 15 MG: 30 INJECTION, SOLUTION INTRAMUSCULAR at 00:59

## 2018-08-09 RX ADMIN — LEVOTHYROXINE SODIUM 25 MCG: 25 TABLET ORAL at 07:35

## 2018-08-09 RX ADMIN — HYDROCODONE BITARTRATE AND ACETAMINOPHEN 1 TABLET: 7.5; 325 TABLET ORAL at 08:07

## 2018-08-09 RX ADMIN — POLYETHYLENE GLYCOL 3350 17 G: 17 POWDER, FOR SOLUTION ORAL at 21:17

## 2018-08-09 RX ADMIN — HYDROCODONE BITARTRATE AND ACETAMINOPHEN 2 TABLET: 7.5; 325 TABLET ORAL at 21:17

## 2018-08-09 RX ADMIN — MELOXICAM 15 MG: 15 TABLET ORAL at 08:06

## 2018-08-09 RX ADMIN — TIMOLOL MALEATE 1 DROP: 5 SOLUTION/ DROPS OPHTHALMIC at 08:06

## 2018-08-09 RX ADMIN — LOSARTAN POTASSIUM 100 MG: 100 TABLET, FILM COATED ORAL at 08:06

## 2018-08-09 RX ADMIN — KETOROLAC TROMETHAMINE 15 MG: 30 INJECTION, SOLUTION INTRAMUSCULAR at 08:07

## 2018-08-09 RX ADMIN — DOCUSATE SODIUM 100 MG: 100 CAPSULE, LIQUID FILLED ORAL at 08:06

## 2018-08-09 RX ADMIN — HYDROCODONE BITARTRATE AND ACETAMINOPHEN 1 TABLET: 7.5; 325 TABLET ORAL at 13:04

## 2018-08-09 RX ADMIN — TIMOLOL MALEATE 1 DROP: 5 SOLUTION/ DROPS OPHTHALMIC at 21:16

## 2018-08-09 RX ADMIN — HYDROCODONE BITARTRATE AND ACETAMINOPHEN 2 TABLET: 7.5; 325 TABLET ORAL at 17:11

## 2018-08-09 RX ADMIN — DOCUSATE SODIUM 100 MG: 100 CAPSULE, LIQUID FILLED ORAL at 21:16

## 2018-08-09 RX ADMIN — SERTRALINE 50 MG: 50 TABLET, FILM COATED ORAL at 08:06

## 2018-08-09 RX ADMIN — WARFARIN SODIUM 4 MG: 4 TABLET ORAL at 17:11

## 2018-08-09 NOTE — PROGRESS NOTES
Orthopedic Total Knee Progress Note      Patient: Ghada Ryan  Date of Admission: 8/8/2018  YOB: 1935  Medical Record Number: [id number]    POD # : 1 Day Post-Op Procedure(s) (LRB):  RT TOTAL KNEE ARTHROPLASTY (Right)    Systemic or Specific Complaints: No Complaints    Pain Relief: complete resolution    Physical Exam:  82 y.o.  female  Vitals:  Temp:  [96 °F (35.6 °C)-98.3 °F (36.8 °C)] 98.3 °F (36.8 °C)  Heart Rate:  [54-71] 60  Resp:  [12-18] 16  BP: (119-180)/() 134/72  alert and oriented  Chest: Clear to auscultation  CV: Regular Rate and Rhythm  Abd: Soft, NT, with BS +  Ext: NV intact. ROM appropriate. Calf is soft and nontender. Negative Homans Sn  Skin: Incision clean dry and intact w/out signs or  symptoms of infection.    Activity: Mobilizing Per P.T.   Weight Bearing: As Tolerated    Data Review     Admission on 08/08/2018   Component Date Value Ref Range Status   • Protime 08/08/2018 13.3  11.7 - 14.2 Seconds Final   • INR 08/08/2018 1.03  0.90 - 1.10 Final   • Hemoglobin 08/09/2018 10.8* 11.9 - 15.5 g/dL Final   • Hematocrit 08/09/2018 33.8* 35.6 - 45.5 % Final   • Protime 08/09/2018 14.0  11.7 - 14.2 Seconds Final   • INR 08/09/2018 1.10  0.90 - 1.10 Final       No results found.    Medications    budesonide-formoterol 1 puff Inhalation Daily   docusate sodium 100 mg Oral BID   levothyroxine 25 mcg Oral Daily   losartan 100 mg Oral QAM   meloxicam 15 mg Oral Daily   polyethylene glycol 17 g Oral BID   sertraline 50 mg Oral Daily   timolol 1 drop Both Eyes Q12H   vancomycin 15 mg/kg Intravenous Once   warfarin 4 mg Oral Daily     •  albuterol  •  bisacodyl  •  HYDROcodone-acetaminophen  •  HYDROcodone-acetaminophen  •  melatonin  •  ondansetron **OR** ondansetron ODT **OR** ondansetron    Assessment:  Doing well POD  # 1 Day Post-Op following total knee replacement  Problem List Items Addressed This Visit        Musculoskeletal and Integument    *  (Principal)Primary osteoarthritis of right knee - Primary    Relevant Medications    vancomycin 1250 mg/250 mL 0.9% NS IVPB ([facility]) (Completed)      Other Visit Diagnoses     Difficulty walking              Plan:   Continue efforts to mobilize  Continue Pain Control Measures  Continue incisional Care  DVT prophylaxis    Discharge Plan:Rehab Saturday    Rosa Maria Sandoval, APRN    Date: 8/9/2018  Time: 9:00 AM

## 2018-08-09 NOTE — PLAN OF CARE
Problem: Patient Care Overview  Goal: Plan of Care Review  Outcome: Ongoing (interventions implemented as appropriate)   08/09/18 1144   OTHER   Outcome Summary POD 1 RTK. Vitals stable. Ambulates with assist x1. voiding without difficulty. Tobi dressing dry and intact with ACE wrap. No c/o nausea or vomiting this shift. Educated patient on importance of monitoring blood pressure given h/o hypertension, verbalises understanding. Plan for rehab on D/C. Will continue to monitor.   Coping/Psychosocial   Plan of Care Reviewed With patient   Plan of Care Review   Progress improving     Goal: Individualization and Mutuality  Outcome: Ongoing (interventions implemented as appropriate)    Goal: Discharge Needs Assessment  Outcome: Ongoing (interventions implemented as appropriate)    Goal: Interprofessional Rounds/Family Conf  Outcome: Ongoing (interventions implemented as appropriate)      Problem: Fall Risk (Adult)  Goal: Absence of Fall  Outcome: Ongoing (interventions implemented as appropriate)      Problem: Knee Arthroplasty (Total, Partial) (Adult)  Goal: Signs and Symptoms of Listed Potential Problems Will be Absent, Minimized or Managed (Knee Arthroplasty)  Outcome: Ongoing (interventions implemented as appropriate)

## 2018-08-09 NOTE — PLAN OF CARE
Problem: Patient Care Overview  Goal: Plan of Care Review  Outcome: Ongoing (interventions implemented as appropriate)   08/09/18 4295   OTHER   Outcome Summary WORKED C PT X2, PAIN CONTROLLED C PO MEDS, UP TO CHAIR, AMBULATING, VOIDING PER BRP, NVI, VSS, A&O X4   Coping/Psychosocial   Plan of Care Reviewed With patient   Plan of Care Review   Progress improving     Goal: Individualization and Mutuality  Outcome: Ongoing (interventions implemented as appropriate)    Goal: Discharge Needs Assessment  Outcome: Ongoing (interventions implemented as appropriate)    Goal: Interprofessional Rounds/Family Conf  Outcome: Ongoing (interventions implemented as appropriate)      Problem: Fall Risk (Adult)  Goal: Absence of Fall  Outcome: Ongoing (interventions implemented as appropriate)      Problem: Knee Arthroplasty (Total, Partial) (Adult)  Goal: Signs and Symptoms of Listed Potential Problems Will be Absent, Minimized or Managed (Knee Arthroplasty)  Outcome: Ongoing (interventions implemented as appropriate)    Goal: Anesthesia/Sedation Recovery  Outcome: Ongoing (interventions implemented as appropriate)

## 2018-08-09 NOTE — PROGRESS NOTES
Discharge Planning Assessment  [facility]     Patient Name: Ghada Ryan  MRN: [id number]  Today's Date: 8/9/2018    Admit Date: 8/8/2018          Discharge Needs Assessment     Row Name 08/09/18 1106       Living Environment    Lives With facility resident    Current Living Arrangements independent/assisted living facility    Able to Return to Prior Arrangements yes       Resource/Environmental Concerns    Resource/Environmental Concerns none       Transition Planning    Patient/Family Anticipates Transition to inpatient rehabilitation facility    Transportation Anticipated family or friend will provide       Discharge Needs Assessment    Readmission Within the Last 30 Days no previous admission in last 30 days    Concerns to be Addressed no discharge needs identified    Equipment Currently Used at Home bipap/cpap    Discharge Facility/Level of Care Needs nursing facility, skilled    Offered/Gave Vendor List yes            Discharge Plan     Row Name 08/09/18 1108       Plan    Plan [facility], bed ready Saturday 8/11    Patient/Family in Agreement with Plan yes    Plan Comments Facesheet and d/c plan verified per pre-op assessment. 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 Jaclyn/[facility] notified and will accept; bed ready Sat 8/11.         Destination - Selection Complete     Service Request Status Selected Specialties Address Phone Number Fax Number    [facility] [address] [phone] [phone]        Cleopatra Paniagua RN 8/9/2018 1111    .8/9/2018  [facility] following.                  Durable Medical Equipment     No service coordination in this encounter.      Dialysis/Infusion     No service coordination in this encounter.      Home Medical Care     No service coordination in this encounter.      Social Care     No service coordination in this encounter.                 Demographic Summary    No documentation.           Functional Status     Row Name 08/09/18 1108       Functional Status    Usual Activity Tolerance moderate    Current Activity Tolerance fair       Functional Status, IADL    Medications independent    Meal Preparation independent    Housekeeping independent    Laundry independent    Shopping independent       Mental Status    General Appearance WDL WDL       Mental Status Summary    Recent Changes in Mental Status/Cognitive Functioning no changes            Psychosocial    No documentation.           Abuse/Neglect    No documentation.           Legal    No documentation.           Substance Abuse    No documentation.           Patient Forms     Row Name 08/09/18 1108       Patient Forms    Provider Choice List Delivered    Delivered to Patient    Method of delivery In person          Cleopatra Paniagua RN

## 2018-08-09 NOTE — THERAPY TREATMENT NOTE
Acute Care - Physical Therapy Treatment Note  [facility]     Patient Name: Ghada Ryan  : 1935  MRN: [id number]  Today's Date: 2018  Onset of Illness/Injury or Date of Surgery: 18  Date of Referral to PT: 18  Referring Physician: Demond Forrest MD    Admit Date: 2018    Visit Dx:    ICD-10-CM ICD-9-CM   1. Primary osteoarthritis of right knee M17.11 715.16   2. Difficulty walking R26.2 719.7     Patient Active Problem List   Diagnosis   • DJD (degenerative joint disease), ankle and foot   • Primary osteoarthritis of both knees   • GERD (gastroesophageal reflux disease)   • Sleep apnea   • Asthma   • Hypertension   • Hyperlipidemia   • Hypothyroidism, acquired   • Pulmonary nodule, right, followed by Dr Fung   • Tubular adenoma of colon   • Vitamin D insufficiency   • Anemia   • Anxiety   • Primary osteoarthritis of right knee   • Benign essential hypertension   • OA (osteoarthritis) of knee       Therapy Treatment          Rehabilitation Treatment Summary     Row Name 18 1300             Treatment Time/Intention    Discipline physical therapy assistant  -      Document Type therapy note (daily note)  -      Subjective Information complains of;weakness;fatigue;pain  -      Care Plan Review patient/other agree to care plan  -      Comment impulsive-cues to slow pace for safety  -      Existing Precautions/Restrictions fall  -      Recorded by [] Disha Oswald PTA 18 1334      Row Name 18 1300             Sit-Stand Transfer    Sit-Stand O[place]llo (Transfers) contact guard;verbal cues  -      Assistive Device (Sit-Stand Transfers) walker, front-wheeled  -      Recorded by [JM] Disha Oswald PTA 18 1334      Row Name 18 1300             Stand-Sit Transfer    Stand-Sit Othello (Transfers) supervision;verbal cues  -      Assistive Device (Stand-Sit Transfers) walker front-wheeled  -FARHAN      Recorded by [JM]  Disha Oswald, [facility] 08/09/18 1334      Row Name 08/09/18 1300             Gait/Stairs Assessment/Training    Kleberg Level (Gait) contact guard;verbal cues  -      Assistive Device (Gait) walker, front-wheeled  -      Distance in Feet (Gait) 40  -JM      Deviations/Abnormal Patterns (Gait) antalgic;stride length decreased  -      Bilateral Gait Deviations forward flexed posture  -      Comment (Gait/Stairs) fatigue limiting, slight assist to guide rwx, unsteady at times  -      Recorded by [JM] Disha Oswald, [facility] 08/09/18 1525      Row Name 08/09/18 1300             General ROM    GENERAL ROM COMMENTS -5-100  -      Recorded by [] Disha Oswald PTA 08/09/18 1527      Row Name 08/09/18 1300             Positioning and Restraints    Pre-Treatment Position sitting in chair/recliner  -JM      In Chair reclined;call light within reach;encouraged to call for assist;notified nsg  -      Recorded by [] Disha Oswald, [facility] 08/09/18 1527      Row Name 08/09/18 1300             Pain Scale: Numbers Pre/Post-Treatment    Pain Scale: Numbers, Pretreatment 4/10  -      Pain Scale: Numbers, Post-Treatment 5/10  -JM      Pain Location - Side Right  -      Pain Location knee  -      Pain Intervention(s) Medication (See MAR);Repositioned;Cold applied  -      Recorded by [] Disha Oswald, [facility] 08/09/18 1527      Row Name                Wound 08/08/18 0805 Right knee incision    Wound - Properties Group Date first assessed: 08/08/18 [RG] Time first assessed: 0805 [RG] Side: Right [RG] Location: knee [RG] Type: incision [RG] Recorded by:  [RG] Leonela Vasquez RN 08/08/18 0805      User Key  (r) = Recorded By, (t) = Taken By, (c) = Cosigned By    Initials Name Effective Dates Discipline    RG Leonela Vasquez RN 06/16/16 -  Nurse    Disha Calderón, PTA 03/07/18 -  PT          Wound 08/08/18 0805 Right knee incision (Active)   Dressing Appearance dry;intact;no drainage 8/9/2018   8:06 AM   Drainage Amount none 8/9/2018  4:50 AM             Physical Therapy Education     Title: PT OT SLP Therapies (Done)     Topic: Physical Therapy (Done)     Point: Mobility training (Done)    Learning Progress Summary     Learner Status Readiness Method Response Comment Documented by    Patient Done Acceptance E [facility] 08/08/18 1433          Point: Home exercise program (Done)    Learning Progress Summary     Learner Status Readiness Method Response Comment Documented by    Patient Done Acceptance E [facility] 08/08/18 1433          Point: Body mechanics (Done)    Learning Progress Summary     Learner Status Readiness Method Response Comment Documented by    Patient Done Acceptance E [facility] 08/08/18 1433          Point: Precautions (Done)    Learning Progress Summary     Learner Status Readiness Method Response Comment Documented by    Patient Done Acceptance E [facility] 08/08/18 1433                      User Key     Initials Effective Dates Name Provider Type Discipline    CA 06/13/18 -  Gina Guy, PT Physical Therapist PT                    PT Recommendation and Plan     Plan of Care Reviewed With: patient  Progress: improving  Outcome Summary: incr amb dist, cues to slow pace w/all activity for safety, plans SNU at [place]          Outcome Measures     Row Name 08/09/18 1200 08/08/18 1400          How much help from another person do you currently need...    Turning from your back to your side while in flat bed without using bedrails? 4  -JM 3  -CA     Moving from lying on back to sitting on the side of a flat bed without bedrails? 3  -JM 3  -CA     Moving to and from a bed to a chair (including a wheelchair)? 3  -JM 3  -CA     Standing up from a chair using your arms (e.g., wheelchair, bedside chair)? 3  -JM 3  -CA     Climbing 3-5 steps with a railing? 2  -JM 1  -CA     To walk in hospital room? 3  -JM 3  -CA     AM-PAC 6 Clicks Score 18  -JM 16  -CA        Functional Assessment    Outcome Measure Options   -- AM-PAC 6 Clicks Basic Mobility (PT)  -CA       User Key  (r) = Recorded By, (t) = Taken By, (c) = Cosigned By    Initials Name Provider Type    Disha Calderón PTA Physical Therapy Assistant    Gina Hylton, PT Physical Therapist           Time Calculation:         PT Charges     Row Name 08/09/18 1327             Time Calculation    Start Time 0930  -      Stop Time 1019  -      Time Calculation (min) 49 min  -      PT Received On 08/09/18  -FARHAN      PT - Next Appointment 08/09/18  -FARHAN         Time Calculation- PT    Total Timed Code Minutes- PT 20 minute(s)  -        User Key  (r) = Recorded By, (t) = Taken By, (c) = Cosigned By    Initials Name Provider Type    Disha Calderón PTA Physical Therapy Assistant        Therapy Suggested Charges     Code   Minutes Charges    None           Therapy Charges for Today     Code Description Service Date Service Provider Modifiers Qty    00078145067 HC PT THER PROC EA 15 MIN 8/9/2018 Disha Oswald PTA GP, KX 1    69124283915 HC PT THER PROC GROUP 8/9/2018 Disha Oswald PTA GP, KX 1          PT G-Codes  PT Professional Judgement Used?: Yes  Outcome Measure Options: AM-PAC 6 Clicks Basic Mobility (PT)  Score: 16  Functional Limitation: Mobility: Walking and moving around  Mobility: Walking and Moving Around Current Status (): At least 40 percent but less than 60 percent impaired, limited or restricted  Mobility: Walking and Moving Around Goal Status (): At least 20 percent but less than 40 percent impaired, limited or restricted    Disha Oswald PTA  8/9/2018

## 2018-08-09 NOTE — PLAN OF CARE
Problem: Patient Care Overview  Goal: Plan of Care Review  Outcome: Ongoing (interventions implemented as appropriate)   08/09/18 1527   OTHER   Outcome Summary incr amb dist, cues to slow pace w/all activity for safety   Coping/Psychosocial   Plan of Care Reviewed With patient   Plan of Care Review   Progress improving      08/09/18 1527   OTHER   Outcome Summary incr amb dist, cues to slow pace w/all activity for safety, plans SNU at [place]   Coping/Psychosocial   Plan of Care Reviewed With patient   Plan of Care Review   Progress improving

## 2018-08-09 NOTE — THERAPY TREATMENT NOTE
Acute Care - Physical Therapy Treatment Note  [facility]     Patient Name: Ghada Ryan  : 1935  MRN: [id number]  Today's Date: 2018  Onset of Illness/Injury or Date of Surgery: 18  Date of Referral to PT: 18  Referring Physician: Demond Forrest MD    Admit Date: 2018    Visit Dx:    ICD-10-CM ICD-9-CM   1. Primary osteoarthritis of right knee M17.11 715.16   2. Difficulty walking R26.2 719.7     Patient Active Problem List   Diagnosis   • DJD (degenerative joint disease), ankle and foot   • Primary osteoarthritis of both knees   • GERD (gastroesophageal reflux disease)   • Sleep apnea   • Asthma   • Hypertension   • Hyperlipidemia   • Hypothyroidism, acquired   • Pulmonary nodule, right, followed by Dr Fung   • Tubular adenoma of colon   • Vitamin D insufficiency   • Anemia   • Anxiety   • Primary osteoarthritis of right knee   • Benign essential hypertension   • OA (osteoarthritis) of knee       Therapy Treatment          Rehabilitation Treatment Summary     Row Name 18 15318 1300          Treatment Time/Intention    Discipline physical therapy assistant  - physical therapy assistant  -     Document Type therapy note (daily note)  - therapy note (daily note)  -     Subjective Information complains of;weakness;fatigue;pain  - complains of;weakness;fatigue;pain  -     Care Plan Review patient/other agree to care plan  - patient/other agree to care plan  -     Comment slowed pace due to incr pain  - impulsive-cues to slow pace for safety  -     Existing Precautions/Restrictions fall  - fall  -     Recorded by [] Disha Oswald, [facility] 18 1532 [] Disha Oswald, [facility] 18 1334     Row Name 18 1300          Sit-Stand Transfer    Sit-Stand [place] (Transfers) contact guard;verbal cues  -FARHAN contact guard;verbal cues  -     Assistive Device (Sit-Stand Transfers) walker, front-wheeled  -FARHAN walker,  front-wheeled  -JM     Recorded by [JM] Disha Oswald, PTA 08/09/18 1532 [JM] Disha Oswald, PTA 08/09/18 1334     Row Name 08/09/18 1530 08/09/18 1300          Stand-Sit Transfer    Stand-Sit [place] (Transfers) supervision;verbal cues  -JM supervision;verbal cues  -JM     Assistive Device (Stand-Sit Transfers) walker, front-wheeled  - walker, front-wheeled  -JM     Recorded by [JM] Disha Oswald, PTA 08/09/18 1532 [JM] Disha Oswald, PTA 08/09/18 1334     Row Name 08/09/18 1530 08/09/18 1300          Gait/Stairs Assessment/Training    [place] Level (Gait) contact guard;verbal cues  -JM contact guard;verbal cues  -JM     Assistive Device (Gait) walker, front-wheeled  -JM walker, front-wheeled  -JM     Distance in Feet (Gait) 80  -JM 40  -JM     Deviations/Abnormal Patterns (Gait) antalgic;stride length decreased  - antalgic;stride length decreased  -JM     Bilateral Gait Deviations forward flexed posture  -JM forward flexed posture  -     Comment (Gait/Stairs)  -- fatigue limiting, slight assist to guide rwx, unsteady at times  -     Recorded by [JM] Disha Oswald, PTA 08/09/18 1532 [JM] Disha Oswald, [facility] 08/09/18 1525     Row Name 08/09/18 1300             General ROM    GENERAL ROM COMMENTS -5-100  -JM      Recorded by [JM] Disha Oswald, PTA 08/09/18 1527      Row Name 08/09/18 1530 08/09/18 1300          Therapeutic Exercise    Comment (Therapeutic Exercise) TKR protocol x 20 reps w/assist for SLR this PM  - TKR protocol x 15 reps  -     Recorded by [JM] Disha Oswald, PTA 08/09/18 1534 [JM] Disha Oswald, PTA 08/09/18 1532     Row Name 08/09/18 1530 08/09/18 1300          Positioning and Restraints    Pre-Treatment Position sitting in chair/recliner  -JM sitting in chair/recliner  -JM     In Chair reclined;call light within reach;encouraged to call for assist;notified nsg  -JM reclined;call light within reach;encouraged to call for assist;notified jahaira MORELOS      Recorded by [FARHAN] Disha Owsald, PTA 08/09/18 1532 [JM] Disha Oswald, PTA 08/09/18 1527     Row Name 08/09/18 1530 08/09/18 1300          Pain Scale: Numbers Pre/Post-Treatment    Pain Scale: Numbers, Pretreatment 6/10  -JM 4/10  -JM     Pain Scale: Numbers, Post-Treatment 7/10  -JM 5/10  -JM     Pain Location - Side Right  -JM Right  -JM     Pain Location knee  -JM knee  -JM     Pain Intervention(s) Medication (See MAR);Repositioned;Cold applied  - Medication (See MAR);Repositioned;Cold applied  -JM     Recorded by [FARHAN] Disha Oswald, [facility] 08/09/18 1532 [JM] Disha Oswald, [facility] 08/09/18 1527     Row Name                Wound 08/08/18 0805 Right knee incision    Wound - Properties Group Date first assessed: 08/08/18 [RG] Time first assessed: 0805 [RG] Side: Right [RG] Location: knee [RG] Type: incision [RG] Recorded by:  [RG] Leonela Vasquez RN 08/08/18 0805      User Key  (r) = Recorded By, (t) = Taken By, (c) = Cosigned By    Initials Name Effective Dates Discipline    Leonela Washington RN 06/16/16 -  Nurse    Disha Calderón, [facility] 03/07/18 -  PT          Wound 08/08/18 0805 Right knee incision (Active)   Dressing Appearance dry;intact;no drainage 8/9/2018  8:06 AM   Drainage Amount none 8/9/2018  4:50 AM             Physical Therapy Education     Title: PT OT SLP Therapies (Done)     Topic: Physical Therapy (Done)     Point: Mobility training (Done)    Learning Progress Summary     Learner Status Readiness Method Response Comment Documented by    Patient Done Acceptance E VU  CA 08/08/18 1433          Point: Home exercise program (Done)    Learning Progress Summary     Learner Status Readiness Method Response Comment Documented by    Patient Done Acceptance E VU  CA 08/08/18 1433          Point: Body mechanics (Done)    Learning Progress Summary     Learner Status Readiness Method Response Comment Documented by    Patient Done Acceptance E VU  CA 08/08/18 1433          Point: Precautions  (Done)    Learning Progress Summary     Learner Status Readiness Method Response Comment Documented by    Patient Done Acceptance E VU  CA 08/08/18 1433                      User Key     Initials Effective Dates Name Provider Type [facility] 06/13/18 -  Gina Guy, JOI Physical Therapist PT                    PT Recommendation and Plan     Plan of Care Reviewed With: patient  Progress: improving  Outcome Summary: incr amb dist, cues to slow pace w/all activity for safety, plans SNU at [place]          Outcome Measures     Row Name 08/09/18 1200 08/08/18 1400          How much help from another person do you currently need...    Turning from your back to your side while in flat bed without using bedrails? 4  -JM 3  -CA     Moving from lying on back to sitting on the side of a flat bed without bedrails? 3  -JM 3  -CA     Moving to and from a bed to a chair (including a wheelchair)? 3  -JM 3  -CA     Standing up from a chair using your arms (e.g., wheelchair, bedside chair)? 3  -JM 3  -CA     Climbing 3-5 steps with a railing? 2  -JM 1  -CA     To walk in hospital room? 3  - 3  -CA     AM-PAC 6 Clicks Score 18  -JM 16  -CA        Functional Assessment    Outcome Measure Options  -- AM-PAC 6 Clicks Basic Mobility (PT)  -CA       User Key  (r) = Recorded By, (t) = Taken By, (c) = Cosigned By    Initials Name Provider Type    Disha Calderón, PTA Physical Therapy Assistant    CA Gina Guy, PT Physical Therapist           Time Calculation:         PT Charges     Row Name 08/09/18 1532 08/09/18 1327          Time Calculation    Start Time 1400  - 0930  -     Stop Time 1445  - 1019  -     Time Calculation (min) 45 min  - 49 min  -     PT Received On 08/09/18  - 08/09/18  -FARHAN     PT - Next Appointment 08/10/18  - 08/09/18  -        Time Calculation- PT    Total Timed Code Minutes- PT 23 minute(s)  -JM 20 minute(s)  -       User Key  (r) = Recorded By, (t) = Taken By, (c) = Cosigned By     Initials Name Provider Type    Disha Calderón PTA Physical Therapy Assistant        Therapy Suggested Charges     Code   Minutes Charges    None           Therapy Charges for Today     Code Description Service Date Service Provider Modifiers Qty    49564711428 HC PT THER PROC EA 15 MIN 8/9/2018 Disha Oswald PTA GP, KX 1    01963153068 HC PT THER PROC GROUP 8/9/2018 Disha Oswald PTA GP, KX 1    00793370735 HC PT THER PROC EA 15 MIN 8/9/2018 Disha Oswald PTA GP, KX 2    07378801485 HC PT THER PROC GROUP 8/9/2018 Disha Oswald PTA GP, KX 1          PT G-Codes  PT Professional Judgement Used?: Yes  Outcome Measure Options: AM-PAC 6 Clicks Basic Mobility (PT)  Score: 16  Functional Limitation: Mobility: Walking and moving around  Mobility: Walking and Moving Around Current Status (): At least 40 percent but less than 60 percent impaired, limited or restricted  Mobility: Walking and Moving Around Goal Status (): At least 20 percent but less than 40 percent impaired, limited or restricted    Disha Oswald PTA  8/9/2018

## 2018-08-10 LAB
HCT VFR BLD AUTO: 33.2 % (ref 35.6–45.5)
HGB BLD-MCNC: 10.4 G/DL (ref 11.9–15.5)
INR PPP: 1.13 (ref 0.9–1.1)
PROTHROMBIN TIME: 14.3 SECONDS (ref 11.7–14.2)

## 2018-08-10 PROCEDURE — 85610 PROTHROMBIN TIME: CPT | Performed by: ORTHOPAEDIC SURGERY

## 2018-08-10 PROCEDURE — 97150 GROUP THERAPEUTIC PROCEDURES: CPT

## 2018-08-10 PROCEDURE — 97110 THERAPEUTIC EXERCISES: CPT

## 2018-08-10 PROCEDURE — 25010000002 ONDANSETRON PER 1 MG: Performed by: ORTHOPAEDIC SURGERY

## 2018-08-10 PROCEDURE — 85014 HEMATOCRIT: CPT | Performed by: ORTHOPAEDIC SURGERY

## 2018-08-10 PROCEDURE — 85018 HEMOGLOBIN: CPT | Performed by: ORTHOPAEDIC SURGERY

## 2018-08-10 RX ADMIN — MELOXICAM 15 MG: 15 TABLET ORAL at 08:13

## 2018-08-10 RX ADMIN — TIMOLOL MALEATE 1 DROP: 5 SOLUTION/ DROPS OPHTHALMIC at 21:47

## 2018-08-10 RX ADMIN — ONDANSETRON 4 MG: 2 INJECTION INTRAMUSCULAR; INTRAVENOUS at 08:20

## 2018-08-10 RX ADMIN — POLYETHYLENE GLYCOL 3350 17 G: 17 POWDER, FOR SOLUTION ORAL at 08:14

## 2018-08-10 RX ADMIN — DOCUSATE SODIUM 100 MG: 100 CAPSULE, LIQUID FILLED ORAL at 21:47

## 2018-08-10 RX ADMIN — HYDROCODONE BITARTRATE AND ACETAMINOPHEN 2 TABLET: 7.5; 325 TABLET ORAL at 03:35

## 2018-08-10 RX ADMIN — HYDROCODONE BITARTRATE AND ACETAMINOPHEN 2 TABLET: 7.5; 325 TABLET ORAL at 07:32

## 2018-08-10 RX ADMIN — LEVOTHYROXINE SODIUM 25 MCG: 25 TABLET ORAL at 06:51

## 2018-08-10 RX ADMIN — HYDROCODONE BITARTRATE AND ACETAMINOPHEN 1 TABLET: 7.5; 325 TABLET ORAL at 15:26

## 2018-08-10 RX ADMIN — ONDANSETRON 4 MG: 4 TABLET, FILM COATED ORAL at 15:26

## 2018-08-10 RX ADMIN — TIMOLOL MALEATE 1 DROP: 5 SOLUTION/ DROPS OPHTHALMIC at 08:14

## 2018-08-10 RX ADMIN — HYDROCODONE BITARTRATE AND ACETAMINOPHEN 1 TABLET: 7.5; 325 TABLET ORAL at 20:03

## 2018-08-10 RX ADMIN — LOSARTAN POTASSIUM 100 MG: 100 TABLET, FILM COATED ORAL at 08:13

## 2018-08-10 RX ADMIN — SERTRALINE 50 MG: 50 TABLET, FILM COATED ORAL at 08:13

## 2018-08-10 RX ADMIN — DOCUSATE SODIUM 100 MG: 100 CAPSULE, LIQUID FILLED ORAL at 08:13

## 2018-08-10 RX ADMIN — WARFARIN SODIUM 4 MG: 4 TABLET ORAL at 17:32

## 2018-08-10 NOTE — THERAPY TREATMENT NOTE
Acute Care - Physical Therapy Treatment Note  [facility]     Patient Name: Ghada Ryan  : 1935  MRN: [id number]  Today's Date: 8/10/2018  Onset of Illness/Injury or Date of Surgery: 18  Date of Referral to PT: 18  Referring Physician: Demond Forrest MD    Admit Date: 2018    Visit Dx:    ICD-10-CM ICD-9-CM   1. Primary osteoarthritis of right knee M17.11 715.16   2. Difficulty walking R26.2 719.7     Patient Active Problem List   Diagnosis   • DJD (degenerative joint disease), ankle and foot   • Primary osteoarthritis of both knees   • GERD (gastroesophageal reflux disease)   • Sleep apnea   • Asthma   • Hypertension   • Hyperlipidemia   • Hypothyroidism, acquired   • Pulmonary nodule, right, followed by Dr Fung   • Tubular adenoma of colon   • Vitamin D insufficiency   • Anemia   • Anxiety   • Primary osteoarthritis of right knee   • Benign essential hypertension   • OA (osteoarthritis) of knee       Therapy Treatment          Rehabilitation Treatment Summary     Row Name 08/10/18 0930             Treatment Time/Intention    Discipline physical therapy assistant  -      Document Type therapy note (daily note)  -      Subjective Information complains of;weakness;fatigue;pain;swelling  -      Care Plan Review patient/other agree to care plan  -      Existing Precautions/Restrictions fall  -      Recorded by [FARHAN] Disha Oswald PTA 08/10/18 1206      Row Name 08/10/18 0930             Sit-Stand Transfer    Sit-Stand [place] (Transfers) contact guard;verbal cues  -      Assistive Device (Sit-Stand Transfers) walker, front-wheelyogi  -FARHAN      Recorded by [JM] Disha Oswald PTA 08/10/18 1206      Row Name 08/10/18 0930             Stand-Sit Transfer    Stand-Sit [place] (Transfers) contact guard;verbal cues   for RLE  -      Assistive Device (Stand-Sit Transfers) walker, front-jarvis  -FARHAN      Recorded by [FARHAN] Disha Oswald PTA 08/10/18 1206       Row Name 08/10/18 0930             Gait/Stairs Assessment/Training    [place] Level (Gait) contact guard;verbal cues  -      Assistive Device (Gait) walker, front-wheeled  -      Distance in Feet (Gait) 50  -JM      Deviations/Abnormal Patterns (Gait) antalgic;stride length decreased  -      Bilateral Gait Deviations forward flexed posture  -      Comment (Gait/Stairs) pain limiting dist  -JM      Recorded by [] Disha Oswald, [facility] 08/10/18 1208      Row Name 08/10/18 0930             General ROM    GENERAL ROM COMMENTS -8-86  -      Recorded by [] Disha Oswald PTA 08/10/18 1208      Row Name 08/10/18 0930             Therapeutic Exercise    Comment (Therapeutic Exercise) TKR protocol x 25 reps w/assist SLRs  -JM      Recorded by [] Disha Oswald, [facility] 08/10/18 1213      Row Name 08/10/18 0930             Pain Scale: Numbers Pre/Post-Treatment    Pain Scale: Numbers, Pretreatment 8/10  -      Pain Scale: Numbers, Post-Treatment 8/10  -      Pain Location - Side Right  -JM      Pain Location knee  -      Pain Intervention(s) Medication (See MAR);Repositioned;Cold applied  -      Recorded by [] Disha Oswald PTA 08/10/18 1208      Row Name                Wound 08/08/18 0805 Right knee incision    Wound - Properties Group Date first assessed: 08/08/18 [RG] Time first assessed: 0805 [RG] Side: Right [RG] Location: knee [RG] Type: incision [RG] Recorded by:  [RG] Leonela Vasquez RN 08/08/18 0805      User Key  (r) = Recorded By, (t) = Taken By, (c) = Cosigned By    Initials Name Effective Dates Discipline    RG Leonlea Vasquez RN 06/16/16 -  Nurse    Disha Calderón PTA 03/07/18 -  PT          Wound 08/08/18 0805 Right knee incision (Active)   Dressing Appearance dry;intact;no drainage 8/10/2018  8:20 AM   Closure MATT 8/10/2018  8:20 AM   Base dressing in place, unable to visualize 8/10/2018  8:20 AM             Physical Therapy Education     Title: PT OT SLP  Therapies (Done)     Topic: Physical Therapy (Done)     Point: Mobility training (Done)    Learning Progress Summary     Learner Status Readiness Method Response Comment Documented by    Patient Done Acceptance E,TB,D AKUA,NR   08/10/18 1211     Done Acceptance E VU  CA 08/08/18 1433          Point: Home exercise program (Done)    Learning Progress Summary     Learner Status Readiness Method Response Comment Documented by    Patient Done Acceptance E,TB,D VU,NR   08/10/18 1211     Done Acceptance E VU  CA 08/08/18 1433          Point: Body mechanics (Done)    Learning Progress Summary     Learner Status Readiness Method Response Comment Documented by    Patient Done Acceptance E,TB,MARYURI FATIMA,NR   08/10/18 1211     Done Acceptance E VU  CA 08/08/18 1433          Point: Precautions (Done)    Learning Progress Summary     Learner Status Readiness Method Response Comment Documented by    Patient Done Acceptance E,TB,MARYURI FATIMA,NR   08/10/18 1211     Done Acceptance E VU  CA 08/08/18 1433                      User Key     Initials Effective Dates Name Provider Type Discipline     03/07/18 -  Disha Oswald, PTA Physical Therapy Assistant PT    CA 06/13/18 -  Gina Guy PT Physical Therapist PT                    PT Recommendation and Plan     Plan of Care Reviewed With: patient  Progress: improving  Outcome Summary: pain limiting amb dist, assist req for SLRs due to pain /swelling; plans DC to SNU [facility]          Outcome Measures     Row Name 08/10/18 1200 08/09/18 1200 08/08/18 1400       How much help from another person do you currently need...    Turning from your back to your side while in flat bed without using bedrails? 3  -JM 4  -JM 3  -CA    Moving from lying on back to sitting on the side of a flat bed without bedrails? 3  -JM 3  -JM 3  -CA    Moving to and from a bed to a chair (including a wheelchair)? 3  -JM 3  -JM 3  -CA    Standing up from a chair using your arms (e.g., wheelchair, bedside chair)? 3   -JM 3  -JM 3  -CA    Climbing 3-5 steps with a railing? 2  -JM 2  -JM 1  -CA    To walk in hospital room? 3  -JM 3  -JM 3  -CA    AM-PAC 6 Clicks Score 17  -JM 18  -JM 16  -CA       Functional Assessment    Outcome Measure Options  --  -- AM-PAC 6 Clicks Basic Mobility (PT)  -CA      User Key  (r) = Recorded By, (t) = Taken By, (c) = Cosigned By    Initials Name Provider Type    Disha Calderón PTA Physical Therapy Assistant    CA Gina Guy, PT Physical Therapist           Time Calculation:         PT Charges     Row Name 08/10/18 1213             Time Calculation    Start Time 0930  -      Stop Time 1055  -      Time Calculation (min) 85 min  -      PT Received On 08/10/18  -      PT - Next Appointment 08/10/18  -        User Key  (r) = Recorded By, (t) = Taken By, (c) = Cosigned By    Initials Name Provider Type    Disha Calderón PTA Physical Therapy Assistant        Therapy Suggested Charges     Code   Minutes Charges    None           Therapy Charges for Today     Code Description Service Date Service Provider Modifiers Qty    29585221910 HC PT THER PROC EA 15 MIN 8/9/2018 OswaldMauraDisha, PTA GP, KX 1    43405913919 HC PT THER PROC GROUP 8/9/2018 OswaldKhloeDisha, PTA GP, KX 1    14760717683 HC PT THER PROC EA 15 MIN 8/9/2018 Oswald Disha, PTA GP, KX 2    76342381131 HC PT THER PROC GROUP 8/9/2018 Oswald Disha, PTA GP, KX 1    61800780520 HC PT THER PROC EA 15 MIN 8/10/2018 Oswald Disha, PTA GP, KX 2    61830949416 HC PT THER PROC GROUP 8/10/2018 Oswald Disha, PTA GP, KX 1          PT G-Codes  PT Professional Judgement Used?: Yes  Outcome Measure Options: AM-PAC 6 Clicks Basic Mobility (PT)  Score: 16  Functional Limitation: Mobility: Walking and moving around  Mobility: Walking and Moving Around Current Status (): At least 40 percent but less than 60 percent impaired, limited or restricted  Mobility: Walking and Moving Around Goal Status (): At least 20  percent but less than 40 percent impaired, limited or restricted    Disha Oswald, PTA  8/10/2018

## 2018-08-10 NOTE — THERAPY TREATMENT NOTE
Acute Care - Physical Therapy Treatment Note  [facility]     Patient Name: Ghada Ryan  : 1935  MRN: [id number]  Today's Date: 8/10/2018  Onset of Illness/Injury or Date of Surgery: 18  Date of Referral to PT: 18  Referring Physician: Demond Forrest MD    Admit Date: 2018    Visit Dx:    ICD-10-CM ICD-9-CM   1. Primary osteoarthritis of right knee M17.11 715.16   2. Difficulty walking R26.2 719.7     Patient Active Problem List   Diagnosis   • DJD (degenerative joint disease), ankle and foot   • Primary osteoarthritis of both knees   • GERD (gastroesophageal reflux disease)   • Sleep apnea   • Asthma   • Hypertension   • Hyperlipidemia   • Hypothyroidism, acquired   • Pulmonary nodule, right, followed by Dr Fung   • Tubular adenoma of colon   • Vitamin D insufficiency   • Anemia   • Anxiety   • Primary osteoarthritis of right knee   • Benign essential hypertension   • OA (osteoarthritis) of knee       Therapy Treatment          Rehabilitation Treatment Summary     Row Name 08/10/18 1540 08/10/18 0930          Treatment Time/Intention    Discipline physical therapy assistant  - physical therapy assistant  -     Document Type therapy note (daily note)  - therapy note (daily note)  -     Subjective Information complains of;weakness;fatigue;pain;swelling  - complains of;weakness;fatigue;pain;swelling  -     Care Plan Review patient/other agree to care plan  - patient/other agree to care plan  -     Comment incr pain and fatigue limiting dist  -  --     Existing Precautions/Restrictions fall  - fall  -     Recorded by [] Disha Oswald PTA 08/10/18 1542 [] Disha Oswald PTA 08/10/18 1208     Row Name 08/10/18 1540 08/10/18 0930          Sit-Stand Transfer    Sit-Stand White (Transfers) contact guard;verbal cues  - contact guard;verbal cues  -     Assistive Device (Sit-Stand Transfers) walker, front-wheeled  - walker, front-wheeled   -JM     Recorded by [JM] Disha Oswald, PTA 08/10/18 1542 [JM] Disha Oswald, PTA 08/10/18 1208     Row Name 08/10/18 1540 08/10/18 0930          Stand-Sit Transfer    Stand-Sit [place] (Transfers) contact guard;verbal cues   for RLE  -JM contact guard;verbal cues   for RLE  -JM     Assistive Device (Stand-Sit Transfers) walker, front-wheeled  - walker, front-wheeled  -JM     Recorded by [JM] Disha Oswald, PTA 08/10/18 1542 [JM] Disha Oswald, PTA 08/10/18 1208     Row Name 08/10/18 1540 08/10/18 0930          Gait/Stairs Assessment/Training    Crook Level (Gait) contact guard;verbal cues  - contact guard;verbal cues  -     Assistive Device (Gait) walker, front-wheeled  -JM walker, front-wheeled  -     Distance in Feet (Gait) 45  -JM 50  -JM     Deviations/Abnormal Patterns (Gait) antalgic;stride length decreased  - antalgic;stride length decreased  -JM     Bilateral Gait Deviations forward flexed posture  -JM forward flexed posture  -JM     Comment (Gait/Stairs)  -- pain limiting dist  -     Recorded by [] Disha Oswald, PTA 08/10/18 1542 [JM] Disha Oswald, PTA 08/10/18 1208     Row Name 08/10/18 0930             General ROM    GENERAL ROM COMMENTS -8-86  -      Recorded by [JM] Disha Oswald, PTA 08/10/18 1208      Row Name 08/10/18 0930             Therapeutic Exercise    Comment (Therapeutic Exercise) TKR protocol x 25 reps w/assist SLRs  -      Recorded by [JM] Disha Oswald, PTA 08/10/18 1213      Row Name 08/10/18 1540 08/10/18 0930          Positioning and Restraints    Pre-Treatment Position sitting in chair/recliner  - sitting in chair/recliner  -JM     Post Treatment Position bathroom  -JM  --     In Chair  -- reclined;call light within reach;encouraged to call for assist;with nsg  -     Bathroom call light within reach;encouraged to call for assist;notified nsg  -  --     Recorded by [JM] Disha Oswald, PTA 08/10/18 1542 [JM] Darek,  Disha, [facility] 08/10/18 1542     Row Name 08/10/18 1540 08/10/18 0930          Pain Scale: Numbers Pre/Post-Treatment    Pain Scale: Numbers, Pretreatment 8/10  - 8/10  -     Pain Scale: Numbers, Post-Treatment 8/10  - 8/10  -     Pain Location - Side Right  -JM Right  -     Pain Location knee  -JM knee  -     Pain Intervention(s)  -- Medication (See MAR);Repositioned;Cold applied  -     Recorded by [FARHAN] Disha Oswald, [facility] 08/10/18 1542 [JM] Disha Oswald, [facility] 08/10/18 1208     Row Name                Wound 08/08/18 0805 Right knee incision    Wound - Properties Group Date first assessed: 08/08/18 [RG] Time first assessed: 0805 [RG] Side: Right [RG] Location: knee [RG] Type: incision [RG] Recorded by:  [ARON] Leonela Vasquez RN 08/08/18 0805      User Key  (r) = Recorded By, (t) = Taken By, (c) = Cosigned By    Initials Name Effective Dates Discipline    RG Leonela Vasquez RN 06/16/16 -  Nurse    Disha Calderón, [facility] 03/07/18 -  PT          Wound 08/08/18 0805 Right knee incision (Active)   Dressing Appearance dry;intact;no drainage 8/10/2018  8:20 AM   Closure MATT 8/10/2018  8:20 AM   Base dressing in place, unable to visualize 8/10/2018  8:20 AM             Physical Therapy Education     Title: PT OT SLP Therapies (Done)     Topic: Physical Therapy (Done)     Point: Mobility training (Done)    Learning Progress Summary     Learner Status Readiness Method Response Comment Documented by    Patient Done Acceptance PRASHANTH BROWN D VU, NR   08/10/18 1211     Done Acceptance E VU  CA 08/08/18 1433          Point: Home exercise program (Done)    Learning Progress Summary     Learner Status Readiness Method Response Comment Documented by    Patient Done Acceptance PRASHANTH BROWN D VU, NR   08/10/18 1211     Done Acceptance E VU  CA 08/08/18 1433          Point: Body mechanics (Done)    Learning Progress Summary     Learner Status Readiness Method Response Comment Documented by    Patient Done Acceptance PRASHANTH BROWN D  ALICE FATIMA   08/10/18 1211     Done Acceptance E [facility] 08/08/18 1433          Point: Precautions (Done)    Learning Progress Summary     Learner Status Readiness Method Response Comment Documented by    Patient Done Acceptance E,TB,D AKUA,ALICE   08/10/18 1211     Done Acceptance E [facility] 08/08/18 1433                      User Key     Initials Effective Dates Name Provider Type [facility] 03/07/18 -  Disha Oswald PTA Physical Therapy Assistant PT    CA 06/13/18 -  Gina Guy, PT Physical Therapist PT                    PT Recommendation and Plan     Plan of Care Reviewed With: patient  Progress: improving  Outcome Summary: pain limiting amb dist, assist req for SLRs due to pain /swelling; plans DC to SNU [facility]          Outcome Measures     Row Name 08/10/18 1200 08/09/18 1200 08/08/18 1400       How much help from another person do you currently need...    Turning from your back to your side while in flat bed without using bedrails? 3  -JM 4  -JM 3  -CA    Moving from lying on back to sitting on the side of a flat bed without bedrails? 3  -JM 3  -JM 3  -CA    Moving to and from a bed to a chair (including a wheelchair)? 3  -JM 3  -JM 3  -CA    Standing up from a chair using your arms (e.g., wheelchair, bedside chair)? 3  -JM 3  -JM 3  -CA    Climbing 3-5 steps with a railing? 2  -JM 2  -JM 1  -CA    To walk in hospital room? 3  -JM 3  -JM 3  -CA    AM-PAC 6 Clicks Score 17  -JM 18  -JM 16  -CA       Functional Assessment    Outcome Measure Options  --  -- AM-PAC 6 Clicks Basic Mobility (PT)  -CA      User Key  (r) = Recorded By, (t) = Taken By, (c) = Cosigned By    Initials Name Provider Type     Disha Oswald PTA Physical Therapy Assistant    CA Gina Guy, PT Physical Therapist           Time Calculation:         PT Charges     Row Name 08/10/18 1542 08/10/18 1213          Time Calculation    Start Time 1407  -JM 0930  -JM     Stop Time 1510  -JM 1055  -JM     Time Calculation (min) 63 min   -FARHAN 85 min  -FARHAN     PT Received On 08/10/18  -FARHAN 08/10/18  -FARHAN     PT - Next Appointment 08/11/18  -FARHAN 08/10/18  -FARHAN        Time Calculation- PT    Total Timed Code Minutes- PT 30 minute(s)  -FARHAN  --       User Key  (r) = Recorded By, (t) = Taken By, (c) = Cosigned By    Initials Name Provider Type    Dihsa Calderón, PTA Physical Therapy Assistant        Therapy Suggested Charges     Code   Minutes Charges    None           Therapy Charges for Today     Code Description Service Date Service Provider Modifiers Qty    57799467405 HC PT THER PROC EA 15 MIN 8/9/2018 Maura Oswaldfer, PTA GP, KX 1    61159264046 HC PT THER PROC GROUP 8/9/2018 Khloe Oswaldnifer, PTA GP, KX 1    96384130159 HC PT THER PROC EA 15 MIN 8/9/2018 OswaldKhloeDisha, PTA GP, KX 2    44370781380 HC PT THER PROC GROUP 8/9/2018 Khloe Oswaldnifer, PTA GP, KX 1    08123248255 HC PT THER PROC EA 15 MIN 8/10/2018 Khloe Oswaldnifer, PTA GP, KX 2    16273714191 HC PT THER PROC GROUP 8/10/2018 OswaldKhloeDisha, PTA GP, KX 1    09148359585 HC PT THER PROC EA 15 MIN 8/10/2018 Khloe Oswaldnifer, PTA GP, KX 2    97049181197 HC PT THER PROC GROUP 8/10/2018 OswaldKhloeDisha, PTA GP, KX 1          PT G-Codes  PT Professional Judgement Used?: Yes  Outcome Measure Options: AM-PAC 6 Clicks Basic Mobility (PT)  Score: 16  Functional Limitation: Mobility: Walking and moving around  Mobility: Walking and Moving Around Current Status (): At least 40 percent but less than 60 percent impaired, limited or restricted  Mobility: Walking and Moving Around Goal Status (): At least 20 percent but less than 40 percent impaired, limited or restricted    Disha Oswald PTA  8/10/2018

## 2018-08-10 NOTE — PLAN OF CARE
Problem: Patient Care Overview  Goal: Plan of Care Review  Outcome: Ongoing (interventions implemented as appropriate)   08/10/18 1208   OTHER   Outcome Summary pain limiting amb dist, assist req for SLRs due to pain /swelling   Coping/Psychosocial   Plan of Care Reviewed With patient      08/10/18 1208   OTHER   Outcome Summary pain limiting amb dist, assist req for SLRs due to pain /swelling; plans DC to SNU [facility]   Coping/Psychosocial   Plan of Care Reviewed With patient

## 2018-08-10 NOTE — PLAN OF CARE
Problem: Patient Care Overview  Goal: Plan of Care Review  Outcome: Ongoing (interventions implemented as appropriate)   08/10/18 1538   OTHER   Outcome Summary post-op day # 2 RTKA, VSS, DORY dressing in place, nausea with emesis this am, zofran given, pt reports imporvement, plans DC to [place] tomorrow, educated on the importance of BP monitoring related to history of HTN   Coping/Psychosocial   Plan of Care Reviewed With patient   Plan of Care Review   Progress improving     Goal: Individualization and Mutuality  Outcome: Ongoing (interventions implemented as appropriate)   08/10/18 1538   Individualization   Patient Specific Preferences none stated     Goal: Discharge Needs Assessment  Outcome: Ongoing (interventions implemented as appropriate)   08/10/18 1538   Discharge Needs Assessment   Readmission Within the Last 30 Days no previous admission in last 30 days   Concerns to be Addressed basic needs   Patient/Family Anticipates Transition to inpatient rehabilitation facility   Patient/Family Anticipated Services at Transition rehabilitation services   Transportation Concerns car, none   Transportation Anticipated family or friend will provide   Anticipated Changes Related to Illness inability to care for self   Equipment Needed After Discharge wound care supplies;walker, standard   Outpatient/Agency/Support Group Needs inpatient rehabilitation facility       Problem: Fall Risk (Adult)  Goal: Absence of Fall  Outcome: Ongoing (interventions implemented as appropriate)   08/10/18 1538   Fall Risk (Adult)   Absence of Fall achieves outcome       Problem: Knee Arthroplasty (Total, Partial) (Adult)  Goal: Signs and Symptoms of Listed Potential Problems Will be Absent, Minimized or Managed (Knee Arthroplasty)  Outcome: Ongoing (interventions implemented as appropriate)   08/10/18 1538   Goal/Outcome Evaluation   Problems Assessed (Knee Arthroplasty) all   Problems Present (Knee Arthroplasty) pain;functional  deficit;situational response;VTE (venous thromboembolism)

## 2018-08-10 NOTE — PLAN OF CARE
Problem: Patient Care Overview  Goal: Plan of Care Review  Outcome: Ongoing (interventions implemented as appropriate)   08/10/18 0157   OTHER   Outcome Summary POD 2 RTK. vitals stable. Tobi dressing intact . Vascular checks WNL. pain controlled with pain meds. Educated on blood pressure monitoring. plan to d/c to [place] on saturday.   Coping/Psychosocial   Plan of Care Reviewed With patient   Plan of Care Review   Progress improving     Goal: Individualization and Mutuality  Outcome: Ongoing (interventions implemented as appropriate)    Goal: Discharge Needs Assessment  Outcome: Ongoing (interventions implemented as appropriate)    Goal: Interprofessional Rounds/Family Conf  Outcome: Ongoing (interventions implemented as appropriate)      Problem: Fall Risk (Adult)  Goal: Absence of Fall  Outcome: Ongoing (interventions implemented as appropriate)      Problem: Knee Arthroplasty (Total, Partial) (Adult)  Goal: Signs and Symptoms of Listed Potential Problems Will be Absent, Minimized or Managed (Knee Arthroplasty)  Outcome: Ongoing (interventions implemented as appropriate)

## 2018-08-10 NOTE — PROGRESS NOTES
Orthopedic Total Knee Progress Note      Patient: Ghada Ryan  Date of Admission: 8/8/2018  YOB: 1935  Medical Record Number: [id number]    POD # : 2 Days Post-Op Procedure(s) (LRB):  RT TOTAL KNEE ARTHROPLASTY (Right)    Systemic or Specific Complaints: No Complaints    Pain Relief: some relief    Physical Exam:  82 y.o.  female  Vitals:  Temp:  [96.9 °F (36.1 °C)-99 °F (37.2 °C)] 99 °F (37.2 °C)  Heart Rate:  [57-76] 64  Resp:  [16-20] 20  BP: (127-137)/(64-80) 133/72  alert and oriented  Chest: Clear to auscultation  CV: Regular Rate and Rhythm  Abd: Soft, NT, with BS +  Ext: NV intact. ROM appropriate. Calf is soft and nontender. Negative Homans Sn  Skin: Incision clean dry and intact w/out signs or  symptoms of infection.    Activity: Mobilizing Per P.T.   Weight Bearing: As Tolerated    Data Review     Admission on 08/08/2018   Component Date Value Ref Range Status   • Protime 08/08/2018 13.3  11.7 - 14.2 Seconds Final   • INR 08/08/2018 1.03  0.90 - 1.10 Final   • Hemoglobin 08/09/2018 10.8* 11.9 - 15.5 g/dL Final   • Hematocrit 08/09/2018 33.8* 35.6 - 45.5 % Final   • Protime 08/09/2018 14.0  11.7 - 14.2 Seconds Final   • INR 08/09/2018 1.10  0.90 - 1.10 Final   • Hemoglobin 08/10/2018 10.4* 11.9 - 15.5 g/dL Final   • Hematocrit 08/10/2018 33.2* 35.6 - 45.5 % Final   • Protime 08/10/2018 14.3* 11.7 - 14.2 Seconds Final   • INR 08/10/2018 1.13* 0.90 - 1.10 Final       No results found.    Medications    budesonide-formoterol 1 puff Inhalation Daily   docusate sodium 100 mg Oral BID   levothyroxine 25 mcg Oral Daily   losartan 100 mg Oral QAM   meloxicam 15 mg Oral Daily   polyethylene glycol 17 g Oral BID   sertraline 50 mg Oral Daily   timolol 1 drop Both Eyes Q12H   vancomycin 15 mg/kg Intravenous Once   warfarin 4 mg Oral Daily     •  albuterol  •  bisacodyl  •  HYDROcodone-acetaminophen  •  HYDROcodone-acetaminophen  •  melatonin  •  ondansetron **OR** ondansetron ODT  **OR** ondansetron    Assessment:  Doing well POD  # 2 Days Post-Op following total knee replacement  Problem List Items Addressed This Visit        Musculoskeletal and Integument    * (Principal)Primary osteoarthritis of right knee - Primary    Relevant Medications    lactated ringers bolus 500 mL (Completed)    ceFAZolin in dextrose (ANCEF) IVPB solution 2 g (Completed)    meloxicam (MOBIC) tablet 15 mg (Completed)    pregabalin (LYRICA) capsule 150 mg (Completed)    vancomycin 1250 mg/250 mL 0.9% NS IVPB ([facility]) (Completed)      Other Visit Diagnoses     Difficulty walking              Plan:   Continue efforts to mobilize  Continue Pain Control Measures  Continue incisional Care  DVT prophylaxis    Discharge Plan:Rehab tomorrow    Demond Forrest MD    Date: 8/10/2018  Time: 5:59 PM

## 2018-08-11 VITALS
SYSTOLIC BLOOD PRESSURE: 155 MMHG | WEIGHT: 192.9 LBS | DIASTOLIC BLOOD PRESSURE: 76 MMHG | HEIGHT: 64 IN | HEART RATE: 61 BPM | OXYGEN SATURATION: 95 % | TEMPERATURE: 97.7 F | RESPIRATION RATE: 18 BRPM | BODY MASS INDEX: 32.93 KG/M2

## 2018-08-11 LAB
HCT VFR BLD AUTO: 36.8 % (ref 35.6–45.5)
HGB BLD-MCNC: 11.5 G/DL (ref 11.9–15.5)
INR PPP: 1.23 (ref 0.9–1.1)
PROTHROMBIN TIME: 15.3 SECONDS (ref 11.7–14.2)

## 2018-08-11 PROCEDURE — 85014 HEMATOCRIT: CPT | Performed by: ORTHOPAEDIC SURGERY

## 2018-08-11 PROCEDURE — 97150 GROUP THERAPEUTIC PROCEDURES: CPT

## 2018-08-11 PROCEDURE — 97110 THERAPEUTIC EXERCISES: CPT

## 2018-08-11 PROCEDURE — 85610 PROTHROMBIN TIME: CPT | Performed by: ORTHOPAEDIC SURGERY

## 2018-08-11 PROCEDURE — 85018 HEMOGLOBIN: CPT | Performed by: ORTHOPAEDIC SURGERY

## 2018-08-11 RX ORDER — WARFARIN SODIUM 5 MG/1
TABLET ORAL
Qty: 30 TABLET | Refills: 0
Start: 2018-08-11 | End: 2018-08-20 | Stop reason: DRUGHIGH

## 2018-08-11 RX ORDER — ONDANSETRON 4 MG/1
4 TABLET, FILM COATED ORAL EVERY 6 HOURS PRN
Start: 2018-08-11 | End: 2018-11-16

## 2018-08-11 RX ORDER — HYDROCODONE BITARTRATE AND ACETAMINOPHEN 7.5; 325 MG/1; MG/1
TABLET ORAL
Qty: 84 TABLET | Refills: 0 | Status: SHIPPED | OUTPATIENT
Start: 2018-08-11 | End: 2018-09-07 | Stop reason: SDUPTHER

## 2018-08-11 RX ADMIN — HYDROCODONE BITARTRATE AND ACETAMINOPHEN 1 TABLET: 7.5; 325 TABLET ORAL at 08:21

## 2018-08-11 RX ADMIN — HYDROCODONE BITARTRATE AND ACETAMINOPHEN 1 TABLET: 7.5; 325 TABLET ORAL at 12:49

## 2018-08-11 RX ADMIN — POLYETHYLENE GLYCOL 3350 17 G: 17 POWDER, FOR SOLUTION ORAL at 06:45

## 2018-08-11 RX ADMIN — LEVOTHYROXINE SODIUM 25 MCG: 25 TABLET ORAL at 08:21

## 2018-08-11 RX ADMIN — DOCUSATE SODIUM 100 MG: 100 CAPSULE, LIQUID FILLED ORAL at 08:21

## 2018-08-11 RX ADMIN — HYDROCODONE BITARTRATE AND ACETAMINOPHEN 1 TABLET: 7.5; 325 TABLET ORAL at 03:45

## 2018-08-11 RX ADMIN — TIMOLOL MALEATE 1 DROP: 5 SOLUTION/ DROPS OPHTHALMIC at 08:24

## 2018-08-11 RX ADMIN — ONDANSETRON 4 MG: 4 TABLET, FILM COATED ORAL at 03:45

## 2018-08-11 RX ADMIN — MELOXICAM 15 MG: 15 TABLET ORAL at 08:21

## 2018-08-11 RX ADMIN — LOSARTAN POTASSIUM 100 MG: 100 TABLET, FILM COATED ORAL at 08:21

## 2018-08-11 RX ADMIN — SERTRALINE 50 MG: 50 TABLET, FILM COATED ORAL at 12:49

## 2018-08-11 NOTE — THERAPY TREATMENT NOTE
Acute Care - Physical Therapy Treatment Note  [facility]     Patient Name: Ghada Ryan  : 1935  MRN: [id number]  Today's Date: 2018  Onset of Illness/Injury or Date of Surgery: 18  Date of Referral to PT: 18  Referring Physician: Demond Forrest MD    Admit Date: 2018    Visit Dx:    ICD-10-CM ICD-9-CM   1. Primary osteoarthritis of right knee M17.11 715.16   2. Difficulty walking R26.2 719.7     Patient Active Problem List   Diagnosis   • DJD (degenerative joint disease), ankle and foot   • Primary osteoarthritis of both knees   • GERD (gastroesophageal reflux disease)   • Sleep apnea   • Asthma   • Hypertension   • Hyperlipidemia   • Hypothyroidism, acquired   • Pulmonary nodule, right, followed by Dr Fung   • Tubular adenoma of colon   • Vitamin D insufficiency   • Anemia   • Anxiety   • Primary osteoarthritis of right knee   • Benign essential hypertension   • OA (osteoarthritis) of knee       Therapy Treatment          Rehabilitation Treatment Summary     Row Name 18 1104             Treatment Time/Intention    Discipline physical therapist  -CS      Document Type therapy note (daily note)  -CS      Subjective Information no complaints  -CS      Mode of Treatment individual therapy;physical therapy  -CS      Patient/Family Observations Pt up in chair, agreeable to PT. Pt states she is much better today.   -CS      Therapy Frequency (PT Clinical Impression) 2 times/day  -CS      Patient Effort good  -CS      Existing Precautions/Restrictions fall  -CS      Equipment Issued to Patient walker, front wheeled;gait belt  -CS      Recorded by [CS] Conner Sam, PT 18 1107      Row Name 18 1100             Cognitive Assessment/Intervention- PT/OT    Orientation Status (Cognition) oriented x 3  -CS      Follows Commands (Cognition) WFL  -CS      Personal Safety Interventions gait belt;fall prevention program maintained;nonskid shoes/slippers when out  of bed;supervised activity  -CS      Recorded by [CS] Conner Sam, PT 08/11/18 1107      Row Name 08/11/18 1104             Mobility Assessment/Intervention    Extremity Weight-bearing Status right lower extremity  -CS      Right Lower Extremity (Weight-bearing Status) weight-bearing as tolerated (WBAT)  -CS      Recorded by [CS] Conner Sam, PT 08/11/18 1107      Row Name 08/11/18 1104             Transfer Assessment/Treatment    Transfer Assessment/Treatment sit-stand transfer;stand-sit transfer  -CS      Recorded by [CS] Conner Sam, PT 08/11/18 1107      Row Name 08/11/18 1104             Sit-Stand Transfer    Sit-Stand Kane (Transfers) contact guard;verbal cues  -CS      Assistive Device (Sit-Stand Transfers) walker, front-wheeled  -CS      Recorded by [CS] Conner Sam, PT 08/11/18 1107      Row Name 08/11/18 1104             Stand-Sit Transfer    Stand-Sit Kane (Transfers) contact guard;verbal cues  -CS      Assistive Device (Stand-Sit Transfers) walker, front-wheeled  -CS      Recorded by [CS] Conner Sam, PT 08/11/18 1107      Row Name 08/11/18 1104             Gait/Stairs Assessment/Training    Kane Level (Gait) contact guard;verbal cues  -CS      Assistive Device (Gait) walker, front-wheeled  -CS      Distance in Feet (Gait)  120  -CS2      Pattern (Gait) step-to  -CS      Deviations/Abnormal Patterns (Gait) antalgic;stride length decreased  -CS      Bilateral Gait Deviations forward flexed posture  -CS      Recorded by [CS] Conner Sam, PT 08/11/18 1107  [CS2] Conner Sam, PT 08/11/18 1109      Row Name 08/11/18 1104             Therapeutic Exercise    Comment (Therapeutic Exercise) TKR protocol x15  -CS      Recorded by [CS] Conner Sam, PT 08/11/18 1107      Row Name 08/11/18 1104             Positioning and Restraints    Pre-Treatment Position sitting in chair/recliner  -CS      Post Treatment Position chair  -CS      In Chair  reclined;call light within reach;encouraged to call for assist  -CS      Recorded by [CS] Conner Sam, PT 08/11/18 1107      Row Name                Wound 08/08/18 0805 Right knee incision    Wound - Properties Group Date first assessed: 08/08/18 [RG] Time first assessed: 0805 [RG] Side: Right [RG] Location: knee [RG] Type: incision [RG] Recorded by:  [RG] Leonela Vasquez RN 08/08/18 0805    Row Name 08/11/18 1104             Coping    Observed Emotional State calm;accepting;cooperative;pleasant  -CS      Verbalized Emotional State acceptance  -CS      Recorded by [CS] Conner Sam, PT 08/11/18 1107      Row Name 08/11/18 1104             Plan of Care Review    Plan of Care Reviewed With patient  -CS      Recorded by [CS] Conner Sam, PT 08/11/18 1107      Row Name 08/11/18 1104             Outcome Summary/Treatment Plan (PT)    Anticipated Discharge Disposition (PT) skilled nursing facility  -CS      Recorded by [KAYA] Conner Sam, PT 08/11/18 1107        User Key  (r) = Recorded By, (t) = Taken By, (c) = Cosigned By    Initials Name Effective Dates Discipline    RG Leonela Vasquez RN 06/16/16 -  Nurse    CS Conner Sam, PT 05/14/18 -  PT          Wound 08/08/18 0805 Right knee incision (Active)   Dressing Appearance dry;intact;no drainage 8/11/2018  8:21 AM   Closure MATT 8/11/2018  8:21 AM   Base dressing in place, unable to visualize 8/11/2018  8:21 AM             Physical Therapy Education     Title: PT OT SLP Therapies (Done)     Topic: Physical Therapy (Done)     Point: Mobility training (Done)    Learning Progress Summary     Learner Status Readiness Method Response Comment Documented by    Patient Done Acceptance PRASHANTH BROWN 08/11/18 1107     Done Acceptance PRASHANTH BROWN D VU, NR JM 08/10/18 1211     Done Acceptance E VU  CA 08/08/18 1433          Point: Home exercise program (Done)    Learning Progress Summary     Learner Status Readiness Method Response Comment Documented by     Patient Done Acceptance E,TB VU   08/11/18 1107     Done Acceptance E,TB,D VU,NR   08/10/18 1211     Done Acceptance E VU  CA 08/08/18 1433          Point: Body mechanics (Done)    Learning Progress Summary     Learner Status Readiness Method Response Comment Documented by    Patient Done Acceptance E,TB VU   08/11/18 1107     Done Acceptance E,TB,D VU,NR   08/10/18 1211     Done Acceptance E VU  CA 08/08/18 1433          Point: Precautions (Done)    Learning Progress Summary     Learner Status Readiness Method Response Comment Documented by    Patient Done Acceptance E,TB VU   08/11/18 1107     Done Acceptance E,TB,D VU,NR   08/10/18 1211     Done Acceptance E VU  CA 08/08/18 1433                      User Key     Initials Effective Dates Name Provider Type Discipline     03/07/18 -  Disha Oswald PTA Physical Therapy Assistant PT     05/14/18 -  Conner Sam, PT Physical Therapist PT    CA 06/13/18 -  Gina Guy PT Physical Therapist PT                    PT Recommendation and Plan  Anticipated Discharge Disposition (PT): skilled nursing facility  Therapy Frequency (PT Clinical Impression): 2 times/day  Outcome Summary/Treatment Plan (PT)  Anticipated Discharge Disposition (PT): skilled nursing facility  Plan of Care Reviewed With: patient  Outcome Summary: Pt progressing well, less pain this visit and improved ambulation distance. She anticipates d/c today.           Outcome Measures     Row Name 08/11/18 1100 08/10/18 1200 08/09/18 1200       How much help from another person do you currently need...    Turning from your back to your side while in flat bed without using bedrails? 4  -CS 3  -JM 4  -JM    Moving from lying on back to sitting on the side of a flat bed without bedrails? 4  -CS 3  -JM 3  -JM    Moving to and from a bed to a chair (including a wheelchair)? 3  -CS 3  -JM 3  -JM    Standing up from a chair using your arms (e.g., wheelchair, bedside chair)? 3  -CS 3  -JM 3   -JM    Climbing 3-5 steps with a railing? 3  -CS 2  -JM 2  -JM    To walk in hospital room? 3  -CS 3  -JM 3  -JM    AM-PAC 6 Clicks Score 20  -CS 17  -JM 18  -JM       Functional Assessment    Outcome Measure Options AM-PAC 6 Clicks Basic Mobility (PT)  -CS  --  --    Row Name 08/08/18 1400             How much help from another person do you currently need...    Turning from your back to your side while in flat bed without using bedrails? 3  -CA      Moving from lying on back to sitting on the side of a flat bed without bedrails? 3  -CA      Moving to and from a bed to a chair (including a wheelchair)? 3  -CA      Standing up from a chair using your arms (e.g., wheelchair, bedside chair)? 3  -CA      Climbing 3-5 steps with a railing? 1  -CA      To walk in hospital room? 3  -CA      AM-PAC 6 Clicks Score 16  -CA         Functional Assessment    Outcome Measure Options AM-PAC 6 Clicks Basic Mobility (PT)  -CA        User Key  (r) = Recorded By, (t) = Taken By, (c) = Cosigned By    Initials Name Provider Type    Disha Calderón, PTA Physical Therapy Assistant    Conner Flores, PT Physical Therapist    CA Gina Guy, PT Physical Therapist           Time Calculation:         PT Charges     Row Name 08/11/18 1109             Time Calculation    Start Time 0945  -CS      Stop Time 1030  -      Time Calculation (min) 45 min  -CS      PT Received On 08/11/18  -CS        User Key  (r) = Recorded By, (t) = Taken By, (c) = Cosigned By    Initials Name Provider Type    Conner Flores, PT Physical Therapist        Therapy Suggested Charges     Code   Minutes Charges    None           Therapy Charges for Today     Code Description Service Date Service Provider Modifiers Qty    34847928595 HC PT THER PROC EA 15 MIN 8/11/2018 Conner Sam, PT GP, KX 1    94979979605 HC PT THER SUPP EA 15 MIN 8/11/2018 Conner Sam, PT GP 1    [id number]  PT THER PROC GROUP 8/11/2018 Conner Sam, PT GP, KX 1           PT G-Codes  PT Professional Judgement Used?: Yes  Outcome Measure Options: AM-PAC 6 Clicks Basic Mobility (PT)  Score: 16  Functional Limitation: Mobility: Walking and moving around  Mobility: Walking and Moving Around Current Status (): At least 40 percent but less than 60 percent impaired, limited or restricted  Mobility: Walking and Moving Around Goal Status (): At least 20 percent but less than 40 percent impaired, limited or restricted    Conner Sam, PT  8/11/2018

## 2018-08-11 NOTE — PLAN OF CARE
Problem: Patient Care Overview  Goal: Plan of Care Review   08/11/18 1256   OTHER   Outcome Summary Pt POD 3 RTKA, VSS, NVI, dressing CDI. ambulating well with walker and stand by assist. voiding adequately per BRP. pain controlled with PO pain meds. educated on BP monitoring d/t hx of HTN. D/C to rehab today   Coping/Psychosocial   Plan of Care Reviewed With patient   Plan of Care Review   Progress improving

## 2018-08-11 NOTE — DISCHARGE SUMMARY
Patient Name: Ghada Ryan  Patient YOB: 1935    Date of Admission:  8/8/2018  Date of Discharge:  8/11/2018  Discharge Diagnosis:TOTAL KNEE ARTHROPLASTY    Presenting Problem/History of Present Illness: Primary osteoarthritis of right knee [M17.11]  OA (osteoarthritis) of knee [M17.10]  Primary osteoarthritis of right knee [M17.11]  Admitting Physician: Dr. Demond Forrest    Consults:   Consults     No orders found from 7/10/2018 to 8/9/2018.          DETAILS OF HOSPITAL STAY:  Patient is a 82 y.o. female was admitted to the floor following the above procedure and underwent an uncomplicated hospital stay.  Patient did well with physical therapy and was ambulating well without problems.  On the day of discharge the wound was clean, dry and intact and calf was soft and non tender and Homans sign was negative.  Patient was tolerating  without problems.  Patient will be discharged to rehab    Condition on Discharge:  Stable    Vital Signs  Temp:  [97.1 °F (36.2 °C)-99 °F (37.2 °C)] 97.7 °F (36.5 °C)  Heart Rate:  [64-76] 67  Resp:  [18-20] 18  BP: (117-180)/(70-99) 168/86    LABS / Imaging Results:   Admission on 08/08/2018   Component Date Value Ref Range Status   • Protime 08/08/2018 13.3  11.7 - 14.2 Seconds Final   • INR 08/08/2018 1.03  0.90 - 1.10 Final   • Hemoglobin 08/09/2018 10.8* 11.9 - 15.5 g/dL Final   • Hematocrit 08/09/2018 33.8* 35.6 - 45.5 % Final   • Protime 08/09/2018 14.0  11.7 - 14.2 Seconds Final   • INR 08/09/2018 1.10  0.90 - 1.10 Final   • Hemoglobin 08/10/2018 10.4* 11.9 - 15.5 g/dL Final   • Hematocrit 08/10/2018 33.2* 35.6 - 45.5 % Final   • Protime 08/10/2018 14.3* 11.7 - 14.2 Seconds Final   • INR 08/10/2018 1.13* 0.90 - 1.10 Final   • Hemoglobin 08/11/2018 11.5* 11.9 - 15.5 g/dL Final   • Hematocrit 08/11/2018 36.8  35.6 - 45.5 % Final   • Protime 08/11/2018 15.3* 11.7 - 14.2 Seconds Final   • INR 08/11/2018 1.23* 0.90 - 1.10 Final       No results  found.      Discharge Medications     Discharge Medications      New Medications      Instructions Start Date   HYDROcodone-acetaminophen 7.5-325 MG per tablet  Commonly known as:  NORCO   1-2 po q 4-6 hr prn      ondansetron 4 MG tablet  Commonly known as:  ZOFRAN   4 mg, Oral, Every 6 Hours PRN      warfarin 5 MG tablet  Commonly known as:  COUMADIN   Take 5 mg daily unless directed otherwise         Continue These Medications      Instructions Start Date   albuterol 108 (90 Base) MCG/ACT inhaler  Commonly known as:  PROVENTIL HFA;VENTOLIN HFA   2 puffs, Inhalation, Every 6 Hours PRN      BREO ELLIPTA 100-25 MCG/INH aerosol powder   Generic drug:  Fluticasone Furoate-Vilanterol   1 puff, Inhalation, Daily      Cetirizine HCl 10 MG capsule   10 mg, Oral, Daily      levothyroxine 25 MCG tablet  Commonly known as:  SYNTHROID, LEVOTHROID   25 mcg, Oral, Daily      losartan 100 MG tablet  Commonly known as:  COZAAR   100 mg, Oral, Every Morning      meloxicam 15 MG tablet  Commonly known as:  MOBIC   15 mg, Oral, Daily, HOLD PRIOR TO SURGERY       omeprazole 20 MG capsule  Commonly known as:  priLOSEC   20 mg, Oral, Every Morning      polyethylene glycol powder  Commonly known as:  MIRALAX   17 g, Oral, Daily      sertraline 50 MG tablet  Commonly known as:  ZOLOFT   50 mg, Oral, Daily      sertraline 50 MG tablet  Commonly known as:  ZOLOFT   TAKE 1 TABLET BY MOUTH EVERY DAY      timolol 0.5 % ophthalmic solution  Commonly known as:  TIMOPTIC   1 drop, Ophthalmic, 2 Times Daily      VITAMIN B COMPLEX PO   1 tablet, Oral, Daily, HOLD PRIOR TO SURGERY         Stop These Medications    ASPIRIN 81 PO     CHLORHEXIDINE GLUCONATE CLOTH EX     mupirocin 2 % nasal ointment  Commonly known as:  BACTROBAN     vitamin C 500 MG tablet  Commonly known as:  ASCORBIC ACID            Activity at Discharge:     Discharge Instructions: Patient is to continue with physical therapy exercises daily and continue working with the physical  therapist daily. Apply ice regularly. Patient may ice for long periods of time as long as ice is not directly on the skin. Patient instructed on frequent calf pumping exercises.  Patient also instructed on incentive spirometer during hospitalization and encouraged to continue to use at home regularly.  The DORY dressing should be left in place until the suction unit stops functioning (typically 7 days).  If waterproof dressing is intact the patient may shower immediately following discharge. If the dressing becomes disloged or saturated it should be changed. Please refer to the DORY information sheet if you have any questions about the dressing.  You may also call the DORY dressing hotline for questions  related to the dressing ([phone]). If there still other problems or questions related to the dressing despite these measures then you can contact Clara at our office [phone].  After 7 days when the DORY dressing is removed the patient may shower and clean the wound with soap and water.  Patient should keep incision covered with dressing at all times except during showering until staples removed. Follow up appointment in 2 weeks-patient to call the office at [phone] to schedule.  Patient will be discharged on coumadin as directed to keep INR 2-3.  Please draw PT / INR in 2 days and call to office during business hours at [phone].    Discharge Diagnosis:    Patient Active Problem List   Diagnosis   • DJD (degenerative joint disease), ankle and foot   • Primary osteoarthritis of both knees   • GERD (gastroesophageal reflux disease)   • Sleep apnea   • Asthma   • Hypertension   • Hyperlipidemia   • Hypothyroidism, acquired   • Pulmonary nodule, right, followed by Dr Fung   • Tubular adenoma of colon   • Vitamin D insufficiency   • Anemia   • Anxiety   • Primary osteoarthritis of right knee   • Benign essential hypertension   • OA (osteoarthritis) of knee       Follow-up Appointments  Future  Appointments  Date Time Provider Department Center   8/23/2018 10:30 AM Demond Forrest MD [facility] None            Demond TEE. MD [facility]  08/11/18  8:36 AM

## 2018-08-11 NOTE — PLAN OF CARE
Problem: Patient Care Overview  Goal: Plan of Care Review  Outcome: Ongoing (interventions implemented as appropriate)   08/11/18 0209   OTHER   Outcome Summary client POD 2 RTKA. VSS< pain well controlled with prn norco. tolerates amb in hallway and in room well with assist x1, walker, and gait belt. plans to d/c to [facility] 8/11. discussed BP monitoring and parameters r/t hx HTN.    Coping/Psychosocial   Plan of Care Reviewed With patient   Plan of Care Review   Progress improving     Goal: Individualization and Mutuality  Outcome: Ongoing (interventions implemented as appropriate)    Goal: Discharge Needs Assessment  Outcome: Ongoing (interventions implemented as appropriate)      Problem: Fall Risk (Adult)  Goal: Absence of Fall  Outcome: Ongoing (interventions implemented as appropriate)      Problem: Knee Arthroplasty (Total, Partial) (Adult)  Goal: Signs and Symptoms of Listed Potential Problems Will be Absent, Minimized or Managed (Knee Arthroplasty)  Outcome: Ongoing (interventions implemented as appropriate)    Goal: Anesthesia/Sedation Recovery  Outcome: Ongoing (interventions implemented as appropriate)

## 2018-08-11 NOTE — PLAN OF CARE
Problem: Patient Care Overview  Goal: Plan of Care Review  Outcome: Ongoing (interventions implemented as appropriate)   08/11/18 1101   OTHER   Outcome Summary Pt progressing well, less pain this visit and improved ambulation distance. She anticipates d/c today.    Coping/Psychosocial   Plan of Care Reviewed With patient

## 2018-08-13 ENCOUNTER — PATIENT OUTREACH (OUTPATIENT)
Dept: CASE MANAGEMENT | Facility: OTHER | Age: 83
End: 2018-08-13

## 2018-08-13 ENCOUNTER — TELEPHONE (OUTPATIENT)
Dept: ORTHOPEDIC SURGERY | Facility: CLINIC | Age: 83
End: 2018-08-13

## 2018-08-13 NOTE — OUTREACH NOTE
Skilled Nursing Facility Discharge Flowsheet:     Skilled Nursing Facility Discharge Assessment 8/13/2018   Acute Facility Discharged From [place]   Acute Discharge Date 8/11/2018   Name of the Skilled Nursing Facility? [facility]   Tier Level of the Skilled Nursing Facility 1   Purpose of SNF Admission PT;SN   Estimated length of stay for the patient? TBD   Who is the insurance provider or payor of patient stay? Medicare

## 2018-08-13 NOTE — TELEPHONE ENCOUNTER
Protime today is 17.0, INR 1.4.  Will have her take Coumadin 7mg today and tomorrow begin Coumadin 6mg daily.  Will recheck her protime again on Thursday, per RBB. AMB

## 2018-08-14 ENCOUNTER — EPISODE CHANGES (OUTPATIENT)
Dept: CASE MANAGEMENT | Facility: OTHER | Age: 83
End: 2018-08-14

## 2018-08-15 ENCOUNTER — PATIENT OUTREACH (OUTPATIENT)
Dept: CASE MANAGEMENT | Facility: OTHER | Age: 83
End: 2018-08-15

## 2018-08-15 NOTE — OUTREACH NOTE
Skilled Nursing Facility Discharge Flowsheet:     Skilled Nursing Facility Discharge Assessment 8/15/2018   Acute Facility Discharged From [place]   Acute Discharge Date 8/11/2018   Name of the Skilled Nursing Facility? [facility]   Tier Level of the Skilled Nursing Facility 1   Purpose of SNF Admission PT;SN   Estimated length of stay for the patient? TBD   Who is the insurance provider or payor of patient stay? Medicare   Progression of Patient? Daily therapies continue

## 2018-08-16 ENCOUNTER — TELEPHONE (OUTPATIENT)
Dept: ORTHOPEDIC SURGERY | Facility: CLINIC | Age: 83
End: 2018-08-16

## 2018-08-16 NOTE — TELEPHONE ENCOUNTER
ProTime from today is 22.8 with an INR 1.9.  Will continue her Coumadin at 6 mg a day and recheck her ProTime again on Monday.  Patient is being discharged from [facility] and will be followed by [facility].  Have left orders for pro times every Monday with results called to our office.  That her raman dressing battery working last night and they are asking about instructions regarding her dressing.  Have advised them that they can remove the dressing and the patient should begin cleaning the incision with soap and water on a daily basis and applying a dry dressing until she is seen in the office for follow-up per RBB

## 2018-08-16 NOTE — TELEPHONE ENCOUNTER
"Patient's PT: 22.8 & INR 1.9 Drawn today 8/16. Patient on 6mg Coumadin beginning 8/14.     Tobi dressing on R knee turned off last night during night, do they remove it or leave dressing on? Clari \"not sure what's under there - staples, or glue or what\"? Patient is being D/C tmrw 8/17 with [facility] & [facility] PO not till 8/23/18. Please advise/ Thanks / srh  "

## 2018-08-20 ENCOUNTER — PATIENT OUTREACH (OUTPATIENT)
Dept: CASE MANAGEMENT | Facility: OTHER | Age: 83
End: 2018-08-20

## 2018-08-20 ENCOUNTER — TELEPHONE (OUTPATIENT)
Dept: ORTHOPEDIC SURGERY | Facility: CLINIC | Age: 83
End: 2018-08-20

## 2018-08-20 DIAGNOSIS — IMO0001 VENOUS THROMBOEMBOLISM (VTE) PROPHYLAXIS PRESCRIBED AT DISCHARGE: Primary | ICD-10-CM

## 2018-08-20 RX ORDER — WARFARIN SODIUM 4 MG/1
TABLET ORAL
Qty: 46 TABLET | Refills: 0 | Status: SHIPPED | OUTPATIENT
Start: 2018-08-20 | End: 2018-11-16

## 2018-08-20 NOTE — TELEPHONE ENCOUNTER
Protime today is 18.6, INR 1.6.  Patient was discharged home from rehab on Coumadin 6mg daily.  She was given enough medication through yesterday.  Have instructed home health to have her take Coumadin 8mg today and tomorrow resume the Coumadin at 6mg daily and will recheck her protime again on Thursday.  Have e-prescribed a new RX to [facility] for Coumadin 4mg, #46, take 2 tabs po today and tomorrow begin taking 1 1/2 tabs daily, per RBB. AMB

## 2018-08-20 NOTE — OUTREACH NOTE
Skilled Nursing Facility Discharge Flowsheet:     Skilled Nursing Facility Discharge Assessment 8/20/2018   Acute Facility Discharged From [place]   Acute Discharge Date 8/11/2018   Name of the Skilled Nursing Facility? [facility] Level of the Skilled Nursing Facility 1   Purpose of SNF Admission PT;SN   Estimated length of stay for the patient? -   Who is the insurance provider or payor of patient stay? Medicare   Progression of Patient? -   Skilled Nursing Discharge Date? 8/17/2018   Where was the patient discharged to? Home   Home Health Agency at discharge? Yes   Name of Home Health Agency? [facility]

## 2018-08-21 ENCOUNTER — TELEPHONE (OUTPATIENT)
Dept: ORTHOPEDIC SURGERY | Facility: CLINIC | Age: 83
End: 2018-08-21

## 2018-08-21 ENCOUNTER — PATIENT OUTREACH (OUTPATIENT)
Dept: CASE MANAGEMENT | Facility: OTHER | Age: 83
End: 2018-08-21

## 2018-08-21 DIAGNOSIS — Z79.01 ENCOUNTER FOR MONITORING COUMADIN THERAPY: Primary | ICD-10-CM

## 2018-08-21 DIAGNOSIS — Z51.81 ENCOUNTER FOR MONITORING COUMADIN THERAPY: Primary | ICD-10-CM

## 2018-08-21 DIAGNOSIS — Z96.651 STATUS POST TOTAL RIGHT KNEE REPLACEMENT: ICD-10-CM

## 2018-08-21 NOTE — TELEPHONE ENCOUNTER
Received a call from [facility].  Patient informed them today that she is planning to start outpatient physical therapy at [facility] which is right next to her house on Friday.  She is scheduled to see Dr. yadav on Thursday.  I home health nurse says that if she is discharged by [facility] on Wednesday and then they would not be able to go back out to draw her ProTime on Thursday.  Therefore I placed orders for her to have a pro time at [facility] on Thursday and of ask them to have her go by the hospital on her way into the appointment with Dr. yadav.  I have also placed an order for her to have weekly pro times at [facility] beginning next Monday if her copy of the order as well as an order for physical therapy to take with her at the time of her appointment on Thursday

## 2018-08-21 NOTE — OUTREACH NOTE
"Care Management Plan 8/21/2018   Lifestyle Goals Other (See Comment)   Lifestyle Goals Routine f/u with MD's, less pain, increased activity.   Barriers Other (See Comment)   Barriers Pain, disease education.   Self Management Other (See Comment)   Self Management Pt. will follow HEP, increas physical activity.   Annual Wellness Visit:  Patient Has Completed   Care Gaps Addressed Colonoscopy;Mammogram;Pneumonia Vaccine;Flu Shot   Does patient have depression diagnosis? No   Ed Visits past 12 months: 1   Hospitalizations past 12 months 2 or 3   Discharged From: [facility]   Discharged to: SNF   Admit Date: 8/8/18   Discharge Date: 8/11/18   Discharge destination: SNF   Medication Adherence Medications understood   Goal Progress Making Progress Toward Goal(s)   Readiness Scale 9   Confidence Scale 8   Health Literacy Good     The main concerns and/or symptoms the patient would like to address are: [facility]. [facility] services, pt. Will be transferring to out patient PT next week \"if all goes as planned\" F/U with Ortho planned for 8/23/18. Knee incision is healing well per pt., staples remain in place. Reviewed s/s of DVT and infection with patient. Reviewed new schedule for Coumadin with patient and she understands.     Education/instruction provided by Care Coordinator: Explained role of Care Advisor and contact information given to patient.Nurse provided patient education.No other questions or concerns voiced at this time.    Follow Up Outreach Due: ~ 2 weeks    Francy Selby RN    "

## 2018-08-23 ENCOUNTER — OFFICE VISIT (OUTPATIENT)
Dept: ORTHOPEDIC SURGERY | Facility: CLINIC | Age: 83
End: 2018-08-23

## 2018-08-23 ENCOUNTER — TELEPHONE (OUTPATIENT)
Dept: ORTHOPEDIC SURGERY | Facility: CLINIC | Age: 83
End: 2018-08-23

## 2018-08-23 ENCOUNTER — LAB (OUTPATIENT)
Dept: LAB | Facility: HOSPITAL | Age: 83
End: 2018-08-23

## 2018-08-23 VITALS — BODY MASS INDEX: 34.31 KG/M2 | TEMPERATURE: 97.8 F | HEIGHT: 64 IN | WEIGHT: 201 LBS

## 2018-08-23 DIAGNOSIS — Z79.01 ENCOUNTER FOR MONITORING COUMADIN THERAPY: ICD-10-CM

## 2018-08-23 DIAGNOSIS — Z96.651 STATUS POST RIGHT KNEE REPLACEMENT: Primary | ICD-10-CM

## 2018-08-23 DIAGNOSIS — Z51.81 ENCOUNTER FOR MONITORING COUMADIN THERAPY: ICD-10-CM

## 2018-08-23 LAB
INR PPP: 1.55 (ref 0.9–1.1)
PROTHROMBIN TIME: 18.3 SECONDS (ref 11.7–14.2)

## 2018-08-23 PROCEDURE — 36415 COLL VENOUS BLD VENIPUNCTURE: CPT

## 2018-08-23 PROCEDURE — 85610 PROTHROMBIN TIME: CPT

## 2018-08-23 PROCEDURE — 99024 POSTOP FOLLOW-UP VISIT: CPT | Performed by: ORTHOPAEDIC SURGERY

## 2018-08-23 NOTE — PROGRESS NOTES
Ghada Ryan : 1935 MRN: [id number] DATE: 2018    DIAGNOSIS: 2 week follow up right total knee      SUBJECTIVE:Patient returns today for 2 week follow up of right total knee replacement. Patient reports doing well with no unusual complaints. Appears to be progressing appropriately.    OBJECTIVE:   Exam:. The incision is healing appropriately. No sign of infection. Range of motion is progressing as expected. The calf is soft and nontender with a negative Homans sign.    ASSESSMENT: 2 week status post right knee replacement.    PLAN: 1) Staples removed and steri strips applied   2) Order given for PT   3) Discontinue PA hose   4) Continue ice PRN   5) warfarin for 2 weeks   6) Follow up in 6 weeks with repeat Xrays of right knee (3views)    Demond Forrest MD  2018

## 2018-08-23 NOTE — TELEPHONE ENCOUNTER
ProTime today is 18.3 INR is 1.5.  Have left message for the patient to continue her Coumadin at 6 mg daily and will recheck her ProTime again on Monday RBB

## 2018-08-24 ENCOUNTER — EPISODE CHANGES (OUTPATIENT)
Dept: CASE MANAGEMENT | Facility: OTHER | Age: 83
End: 2018-08-24

## 2018-08-29 ENCOUNTER — TELEPHONE (OUTPATIENT)
Dept: ORTHOPEDIC SURGERY | Facility: CLINIC | Age: 83
End: 2018-08-29

## 2018-08-29 NOTE — TELEPHONE ENCOUNTER
Call returned.  Patient has not had her protime drawn this week.  Mother En will have the patient contact me at the office.

## 2018-08-29 NOTE — TELEPHONE ENCOUNTER
----- Message from Ghada Ryan sent at 8/29/2018  7:41 AM EDT -----  Regarding: Test Results Question  Contact: [phone]  MY CHART MESSAGE:  On 8/23/18 SrDontrell Urrutia Gauri Shelby had a lab test Pro-time and INR; both results were higher than the normal.  Does Dr. Forrest want to change her daily Coumadin dosage?  Thank you, Sister En for Sister Ghada Astorga

## 2018-08-30 NOTE — TELEPHONE ENCOUNTER
Protime today is 17.6, INR is 1.5.  Will have her take Coumadin 8mg today and tomorrow resume Coumadin 6mg daily.  Will repeat her Protime again next Tuesday, per RBB.

## 2018-09-04 ENCOUNTER — TELEPHONE (OUTPATIENT)
Dept: ORTHOPEDIC SURGERY | Facility: CLINIC | Age: 83
End: 2018-09-04

## 2018-09-04 ENCOUNTER — LAB (OUTPATIENT)
Dept: LAB | Facility: HOSPITAL | Age: 83
End: 2018-09-04

## 2018-09-04 DIAGNOSIS — Z79.01 ENCOUNTER FOR MONITORING COUMADIN THERAPY: Primary | ICD-10-CM

## 2018-09-04 DIAGNOSIS — Z79.01 ENCOUNTER FOR MONITORING COUMADIN THERAPY: ICD-10-CM

## 2018-09-04 DIAGNOSIS — Z51.81 ENCOUNTER FOR MONITORING COUMADIN THERAPY: ICD-10-CM

## 2018-09-04 DIAGNOSIS — Z51.81 ENCOUNTER FOR MONITORING COUMADIN THERAPY: Primary | ICD-10-CM

## 2018-09-04 LAB
INR PPP: 1.85 (ref 0.9–1.1)
PROTHROMBIN TIME: 21 SECONDS (ref 11.7–14.2)

## 2018-09-04 PROCEDURE — 36415 COLL VENOUS BLD VENIPUNCTURE: CPT

## 2018-09-04 PROCEDURE — 85610 PROTHROMBIN TIME: CPT

## 2018-09-04 NOTE — TELEPHONE ENCOUNTER
ProTime today was 21.0 INR is 1.8.  Will continue her Coumadin at 6 mg daily and recheck her ProTime again on Monday per Dr. yadav

## 2018-09-05 ENCOUNTER — PATIENT OUTREACH (OUTPATIENT)
Dept: CASE MANAGEMENT | Facility: OTHER | Age: 83
End: 2018-09-05

## 2018-09-05 NOTE — OUTREACH NOTE
Pt. Has mad all f/u visits with Ortho, remains compliant with exercise program. Reviewed bleeding precautions, Health Maintenance Gaps. Nurse provided patient education.No other questions or concerns voiced at this time.

## 2018-09-07 RX ORDER — HYDROCODONE BITARTRATE AND ACETAMINOPHEN 7.5; 325 MG/1; MG/1
TABLET ORAL
Qty: 40 TABLET | Refills: 0 | Status: SHIPPED | OUTPATIENT
Start: 2018-09-07 | End: 2019-01-15

## 2018-09-07 NOTE — TELEPHONE ENCOUNTER
"Patient was prescribed HYDROcodone-acetaminophen (NORCO) 7.5-325 (1-2 po q 4-6 PRN) #84 previously filled at [facility] after surgery (patient did not know date of previous fill after surgery)    Has been taking as directed. Hasn't been a month since surgery yet (tomorrow 9/08/18) didn't take pain medication this morning, but now \"distracted\" by pain & only has 3-4 left. Requesting refill.     Patient also asked \"would splitting the tablet in half would be ok? Patient trying to wean herself off pain medication. Please advise. Thanks /srh  "

## 2018-09-10 RX ORDER — OMEPRAZOLE 20 MG/1
CAPSULE, DELAYED RELEASE ORAL
Qty: 90 CAPSULE | Refills: 0 | Status: SHIPPED | OUTPATIENT
Start: 2018-09-10 | End: 2018-12-10 | Stop reason: SDUPTHER

## 2018-09-11 ENCOUNTER — TELEPHONE (OUTPATIENT)
Dept: ORTHOPEDIC SURGERY | Facility: CLINIC | Age: 83
End: 2018-09-11

## 2018-09-12 ENCOUNTER — TELEPHONE (OUTPATIENT)
Dept: ORTHOPEDIC SURGERY | Facility: CLINIC | Age: 83
End: 2018-09-12

## 2018-09-12 NOTE — TELEPHONE ENCOUNTER
Per Oneil Shields (Lab) Registration (Carmen)'s request & AMB direction, informed Carmen that patient's PT / INR lab order is to be STANDING / Recurring, and faxed a copy of LAB order from 8/21/18  to Alyson

## 2018-09-13 NOTE — TELEPHONE ENCOUNTER
Protime from yesterday was 16.0, INR 1.4.  Will d/c her Coumadin and protimes.  Have instructed her to resume her preop ASA 81mg daily, per RBB. AMB

## 2018-09-17 DIAGNOSIS — IMO0001 VENOUS THROMBOEMBOLISM (VTE) PROPHYLAXIS PRESCRIBED AT DISCHARGE: ICD-10-CM

## 2018-09-17 RX ORDER — WARFARIN SODIUM 4 MG/1
TABLET ORAL
Qty: 46 TABLET | Refills: 0 | OUTPATIENT
Start: 2018-09-17

## 2018-09-18 RX ORDER — POLYETHYLENE GLYCOL 3350 17 G/17G
17 POWDER, FOR SOLUTION ORAL DAILY
Qty: 255 G | Refills: 0 | OUTPATIENT
Start: 2018-09-18

## 2018-09-27 NOTE — TELEPHONE ENCOUNTER
Please call Sister about this. It looks like it was held prior to surgery. Did her orthopedic surgeon put her back on? It would be much safer for her to take Tylenol instead for pain.

## 2018-09-28 RX ORDER — MELOXICAM 15 MG/1
TABLET ORAL
Qty: 30 TABLET | Refills: 3 | Status: SHIPPED | OUTPATIENT
Start: 2018-09-28 | End: 2019-02-25

## 2018-11-14 ENCOUNTER — PATIENT OUTREACH (OUTPATIENT)
Dept: CASE MANAGEMENT | Facility: OTHER | Age: 83
End: 2018-11-14

## 2018-11-14 NOTE — OUTREACH NOTE
"Pt. Reports she is \"getting along well\" she does report she went to kneel yesterday and \"went down hard on her new knee\" She can still ambulate and does not limp.Encouraged her to use ice and rest her knee. She is wanting an xray to see if she hurt anything in her knee, suggested she call Dr. Forrest and see if she can be seen or she can see PCP to order xray. Health Maintenance Gaps all reviewed, Shay is active.Nurse provided patient education.No other questions or concerns voiced at this time.  "

## 2018-11-16 ENCOUNTER — OFFICE VISIT (OUTPATIENT)
Dept: ORTHOPEDIC SURGERY | Facility: CLINIC | Age: 83
End: 2018-11-16

## 2018-11-16 VITALS — BODY MASS INDEX: 34.49 KG/M2 | HEIGHT: 64 IN | WEIGHT: 202 LBS | TEMPERATURE: 98.4 F

## 2018-11-16 DIAGNOSIS — M17.12 PRIMARY OSTEOARTHRITIS OF LEFT KNEE: ICD-10-CM

## 2018-11-16 DIAGNOSIS — Z96.651 S/P TOTAL KNEE ARTHROPLASTY, RIGHT: Primary | ICD-10-CM

## 2018-11-16 PROCEDURE — 99213 OFFICE O/P EST LOW 20 MIN: CPT | Performed by: ORTHOPAEDIC SURGERY

## 2018-11-16 PROCEDURE — 73562 X-RAY EXAM OF KNEE 3: CPT | Performed by: ORTHOPAEDIC SURGERY

## 2018-11-16 PROCEDURE — 20610 DRAIN/INJ JOINT/BURSA W/O US: CPT | Performed by: ORTHOPAEDIC SURGERY

## 2018-11-16 RX ORDER — SIMVASTATIN 40 MG
40 TABLET ORAL NIGHTLY
Refills: 1 | COMMUNITY
Start: 2018-09-20 | End: 2019-01-15 | Stop reason: SDUPTHER

## 2018-11-16 RX ORDER — LIDOCAINE HYDROCHLORIDE 20 MG/ML
4 INJECTION, SOLUTION EPIDURAL; INFILTRATION; INTRACAUDAL; PERINEURAL
Status: COMPLETED | OUTPATIENT
Start: 2018-11-16 | End: 2018-11-16

## 2018-11-16 RX ORDER — METHYLPREDNISOLONE ACETATE 80 MG/ML
80 INJECTION, SUSPENSION INTRA-ARTICULAR; INTRALESIONAL; INTRAMUSCULAR; SOFT TISSUE
Status: COMPLETED | OUTPATIENT
Start: 2018-11-16 | End: 2018-11-16

## 2018-11-16 RX ORDER — MONTELUKAST SODIUM 10 MG/1
10 TABLET ORAL DAILY
Refills: 11 | COMMUNITY
Start: 2018-10-08 | End: 2020-02-20

## 2018-11-16 RX ADMIN — LIDOCAINE HYDROCHLORIDE 4 ML: 20 INJECTION, SOLUTION EPIDURAL; INFILTRATION; INTRACAUDAL; PERINEURAL at 15:15

## 2018-11-16 RX ADMIN — METHYLPREDNISOLONE ACETATE 80 MG: 80 INJECTION, SUSPENSION INTRA-ARTICULAR; INTRALESIONAL; INTRAMUSCULAR; SOFT TISSUE at 15:15

## 2018-11-16 NOTE — PROGRESS NOTES
Patient: Ghada Ryan  YOB: 1935 83 y.o. female  Medical Record Number: [id number]    Chief Complaints:   Chief Complaint   Patient presents with   • Right Knee - Follow-up, Pain       History of Present Illness:Ghada Ryan is a 83 y.o. female who presents for follow-up of  both knees.  She is about 4 months out from her right total knee replacement and overall was doing well she was getting down to pick something up and essentially fell forward and flexed her knee and she's had some increasing discomfort since this occurred 2 days ago.  Also complains of the left knee especially was with getting out of a chair or getting started.  She describes an anterior aching type pain.    Allergies:   Allergies   Allergen Reactions   • Contrast Dye Hives       Medications:   Current Outpatient Medications   Medication Sig Dispense Refill   • albuterol (PROVENTIL HFA;VENTOLIN HFA) 108 (90 BASE) MCG/ACT inhaler Inhale 2 puffs Every 6 (Six) Hours As Needed.     • aspirin 81 MG tablet Take 81 mg by mouth.     • B Complex Vitamins (VITAMIN B COMPLEX PO) Take 1 tablet by mouth Daily. HOLD PRIOR TO SURGERY     • Cetirizine HCl 10 MG capsule Take 10 mg by mouth Daily.     • Fluticasone Furoate-Vilanterol (BREO ELLIPTA) 100-25 MCG/INH aerosol powder  Inhale 1 puff Daily.     • HYDROcodone-acetaminophen (NORCO) 7.5-325 MG per tablet 1-2 po q 4-6 hr prn 40 tablet 0   • levothyroxine (SYNTHROID, LEVOTHROID) 25 MCG tablet Take 25 mcg by mouth Daily.     • losartan (COZAAR) 100 MG tablet Take 100 mg by mouth Every Morning.     • meloxicam (MOBIC) 15 MG tablet Take 15 mg by mouth Daily. HOLD PRIOR TO SURGERY     • meloxicam (MOBIC) 15 MG tablet TAKE 1/2 TABLET BY MOUTH TWICE DAILY 30 tablet 3   • montelukast (SINGULAIR) 10 MG tablet Take 10 mg by mouth Daily.  11   • omeprazole (priLOSEC) 20 MG capsule TAKE 1 CAPSULE BY ORAL ROUTE DAILY 90 capsule 0   • polyethylene glycol (MIRALAX) powder TAKE 17 G BY  "MOUTH DAILY. 255 g 0   • sertraline (ZOLOFT) 50 MG tablet TAKE 1 TABLET BY MOUTH EVERY DAY 90 tablet 1   • sertraline (ZOLOFT) 50 MG tablet Take 50 mg by mouth Daily.     • simvastatin (ZOCOR) 40 MG tablet Take 40 mg by mouth Every Night.  1   • timolol (TIMOPTIC) 0.5 % ophthalmic solution Apply 1 drop to eye 2 (Two) Times a Day.       No current facility-administered medications for this visit.          The following portions of the patient's history were reviewed and updated as appropriate: allergies, current medications, past family history, past medical history, past social history, past surgical history and problem list.    Review of Systems:   A 14 point review of systems was performed. All systems negative except pertinent positives/negative listed in HPI above    Physical Exam:   Vitals:    11/16/18 1440   Temp: 98.4 °F (36.9 °C)   Weight: 91.6 kg (202 lb)   Height: 161.3 cm (63.5\")       General: A and O x 3, ASA, NAD    SCLERA:    Normal    DENTITION:   Normal  Knee:  left    ALIGNMENT:     neutral  ,   Patella  tracks  midline    GAIT:    Antalgic    SKIN:    No abnormality    RANGE OF MOTION:   0  -  120   DEG    STRENGTH:   4  / 5    LIGAMENTS:    No varus / valgus instability.   Negative  Lachman.    MENISCUS:     Negative   Beena       DISTAL PULSES:    Paplable    DISTAL SENSATION :   Intact    LYMPHATICS:     No   lymphadenopathy    OTHER:          - Positive   effusion      - Crepitance with ROM       Radiology:  Xrays 3views right knee (ap,lateral, sunrise) were ordered and reviewed for evaluation of knee pain demonstrating advanced patellofemoral  osteoarthritis with bone on bone articulation, subchondral cysts, and periarticular osteophytes.  There is a small nondisplaced crack about the superior aspect of the patella visualized on the lateral film it was not present on previous postop x-rays  todays xrays were compared to previous xrays and show new findings as described " above    Assessment/Plan:  Nondisplaced right suprapatellar fracture.  Avoid the knee flexion or stairs.    Left knee advanced patellofemoral arthritis injected as below.  I will recheck her in 3 months with repeat x-rays of the right knee  Large Joint Arthrocentesis: L knee  Date/Time: 11/16/2018 3:15 PM  Consent given by: patient  Site marked: site marked  Timeout: Immediately prior to procedure a time out was called to verify the correct patient, procedure, equipment, support staff and site/side marked as required   Supporting Documentation  Indications: pain and joint swelling   Procedure Details  Location: knee - L knee  Preparation: Patient was prepped and draped in the usual sterile fashion  Needle size: 22 G  Approach: anterolateral  Medications administered: 4 mL lidocaine PF 2% 2 %; 80 mg methylPREDNISolone acetate 80 MG/ML  Patient tolerance: patient tolerated the procedure well with no immediate complications

## 2018-11-19 ENCOUNTER — EPISODE CHANGES (OUTPATIENT)
Dept: CASE MANAGEMENT | Facility: OTHER | Age: 83
End: 2018-11-19

## 2018-11-28 RX ORDER — MELOXICAM 7.5 MG/1
7.5 TABLET ORAL DAILY
Qty: 90 TABLET | Refills: 1 | Status: SHIPPED | OUTPATIENT
Start: 2018-11-28 | End: 2019-01-15 | Stop reason: DRUGHIGH

## 2018-12-11 RX ORDER — SIMVASTATIN 40 MG
TABLET ORAL
Qty: 90 TABLET | Refills: 1 | Status: SHIPPED | OUTPATIENT
Start: 2018-12-11 | End: 2019-02-25

## 2018-12-11 RX ORDER — OMEPRAZOLE 20 MG/1
CAPSULE, DELAYED RELEASE ORAL
Qty: 90 CAPSULE | Refills: 1 | Status: SHIPPED | OUTPATIENT
Start: 2018-12-11 | End: 2019-02-25

## 2018-12-18 ENCOUNTER — OFFICE VISIT (OUTPATIENT)
Dept: ORTHOPEDIC SURGERY | Facility: CLINIC | Age: 83
End: 2018-12-18

## 2018-12-18 VITALS — WEIGHT: 202 LBS | HEIGHT: 64 IN | TEMPERATURE: 98 F | BODY MASS INDEX: 34.49 KG/M2

## 2018-12-18 DIAGNOSIS — Z96.651 STATUS POST TOTAL RIGHT KNEE REPLACEMENT: Primary | ICD-10-CM

## 2018-12-18 PROCEDURE — 73562 X-RAY EXAM OF KNEE 3: CPT | Performed by: ORTHOPAEDIC SURGERY

## 2018-12-18 PROCEDURE — 99212 OFFICE O/P EST SF 10 MIN: CPT | Performed by: ORTHOPAEDIC SURGERY

## 2018-12-18 NOTE — PROGRESS NOTES
Patient: Ghada Ryan  YOB: 1935 83 y.o. female  Medical Record Number: [id number]    Chief Complaints:   Chief Complaint   Patient presents with   • Right Knee - Follow-up     SX TKA -R 08/08/18         History of Present Illness:Ghada Ryan is a 83 y.o. female who presents for follow-up of  right anterior knee pain she has persistent anterior and distal knee pain she is now about 4 months out from surgery she had a fall with a patella fracture and her pain persistence moderate in nature.  She has pain with attempted deep knee flexion she describes as an ache    Allergies:   Allergies   Allergen Reactions   • Contrast Dye Hives       Medications:   Current Outpatient Medications   Medication Sig Dispense Refill   • albuterol (PROVENTIL HFA;VENTOLIN HFA) 108 (90 BASE) MCG/ACT inhaler Inhale 2 puffs Every 6 (Six) Hours As Needed.     • aspirin 81 MG tablet Take 81 mg by mouth.     • B Complex Vitamins (VITAMIN B COMPLEX PO) Take 1 tablet by mouth Daily. HOLD PRIOR TO SURGERY     • Cetirizine HCl 10 MG capsule Take 10 mg by mouth Daily.     • Fluticasone Furoate-Vilanterol (BREO ELLIPTA) 100-25 MCG/INH aerosol powder  Inhale 1 puff Daily.     • HYDROcodone-acetaminophen (NORCO) 7.5-325 MG per tablet 1-2 po q 4-6 hr prn 40 tablet 0   • levothyroxine (SYNTHROID, LEVOTHROID) 25 MCG tablet Take 25 mcg by mouth Daily.     • losartan (COZAAR) 100 MG tablet Take 100 mg by mouth Every Morning.     • meloxicam (MOBIC) 15 MG tablet Take 15 mg by mouth Daily. HOLD PRIOR TO SURGERY     • meloxicam (MOBIC) 15 MG tablet TAKE 1/2 TABLET BY MOUTH TWICE DAILY 30 tablet 3   • meloxicam (MOBIC) 7.5 MG tablet Take 1 tablet by mouth Daily. 90 tablet 1   • montelukast (SINGULAIR) 10 MG tablet Take 10 mg by mouth Daily.  11   • omeprazole (priLOSEC) 20 MG capsule TAKE 1 CAPSULE BY ORAL ROUTE DAILY 90 capsule 1   • polyethylene glycol (MIRALAX) powder TAKE 17 G BY MOUTH DAILY. 255 g 0   • sertraline  "(ZOLOFT) 50 MG tablet TAKE 1 TABLET BY MOUTH EVERY DAY 90 tablet 1   • sertraline (ZOLOFT) 50 MG tablet Take 50 mg by mouth Daily.     • simvastatin (ZOCOR) 40 MG tablet Take 40 mg by mouth Every Night.  1   • simvastatin (ZOCOR) 40 MG tablet TAKE 1 TABLET BY MOUTH NIGHTLY 90 tablet 1   • timolol (TIMOPTIC) 0.5 % ophthalmic solution Apply 1 drop to eye 2 (Two) Times a Day.       No current facility-administered medications for this visit.          The following portions of the patient's history were reviewed and updated as appropriate: allergies, current medications, past family history, past medical history, past social history, past surgical history and problem list.    Review of Systems:   A 14 point review of systems was performed. All systems negative except pertinent positives/negative listed in HPI above    Physical Exam:   Vitals:    12/18/18 0903   Temp: 98 °F (36.7 °C)   Weight: 91.6 kg (202 lb)   Height: 162.6 cm (64\")       General: A and O x 3, ASA, NAD    SCLERA:    Normal    DENTITION:   Normal  There is no tenderness to palpation about the patella she has mild diffuse soft tissue swelling no effusion no instability she walks with a nonantalgic gait    Radiology:  Xrays 3views right knee  (ap,lateral, sunrise) were ordered and reviewed for evaluation of knee pain demonstratinga well positioned knee replacement without evidence of wear, loosening or osteolysis -  There is a persistent fracture todays xrays were compared to previous xrays and demonstrate no change    Assessment/Plan:  Persistent right anterior knee pain some may be from fracture which has not displaced and overall looks okay.  There may be an AVN component to this I'm not sure.  She also is only 4 months out from surgery which could explain some anterior knee pain.  I've given her an anti-inflammatory gel to apply to the tissues twice daily and she will avoid deep knee flexion or kneeling.  I'll check her back in 2 months with repeat " x-rays.

## 2019-01-15 ENCOUNTER — OFFICE VISIT (OUTPATIENT)
Dept: SURGERY | Facility: CLINIC | Age: 84
End: 2019-01-15

## 2019-01-15 VITALS — WEIGHT: 202 LBS | HEART RATE: 67 BPM | BODY MASS INDEX: 34.49 KG/M2 | HEIGHT: 64 IN | OXYGEN SATURATION: 98 %

## 2019-01-15 DIAGNOSIS — K43.2 INCISIONAL HERNIA, WITHOUT OBSTRUCTION OR GANGRENE: Primary | ICD-10-CM

## 2019-01-15 PROCEDURE — 99213 OFFICE O/P EST LOW 20 MIN: CPT | Performed by: SURGERY

## 2019-01-15 NOTE — PROGRESS NOTES
"CC: Abdominal wall hernia     HPI: The patient is a very pleasant 83-year-old female that is here today for evaluation of abdominal wall hernia.  The patient reports she has been feeling fullness over the right lower quadrant that has been progressively getting worse.  She reports no pain.  She feels urinary frequency but no dysuria.  She was noted to have a hernia last year after her knee surgery but the patient did not follow-up until today.  She reports having good bowel function and denies any nausea and vomiting.  The patient reports weight gain     PMH: Anemia, asthma, GERD, glaucoma, hyperlipidemia, hypertension, sleep apnea, thyroid disease, diverticulosis     PSH: Laparoscopic hand-assisted sigmoid colon resection and incidental appendectomy, breast lumpectomy, colonoscopy 2013 and 2017, wisdom tooth extraction    MEDS: Reviewed in Epic.  No blood thinners     ALL: Contrast dye    FH and SH: Father with kidney cancer and mother with tuberculosis.  The patient is an and and is single.  She does not smoke or drink any alcohol.     ROS:   Constitutional: denies any weight changes, fatigue or weakness.  HEENT: Reports hearing loss and sinus pressure  Cardiovascular: denies chest pain, palpitations, reports edemas  Respiratory: denies cough, sputum, SOB.  Reports sleep apnea  Gastrointestinal: denies N&V, abd pain, diarrhea, constipation.  Genitourinary: Reports urinary frequency and urgency.  Denies dysuria  Endocrine: denies cold intolerance, lethargy and flushing.  Reports heat intolerance  Hem: denies excessive bruising and postop bleeding.  Musculoskeletal: denies weakness, joint swelling, pain or stiffness.  Neuro: denies seizures, CVA, paresthesia, or peripheral neuropathy.  Reports joint pain  Skin: denies change in nevi, rashes, masses.     PE:   Vitals:    01/15/19 1444   Pulse: 67   SpO2: 98%   Weight: 91.6 kg (202 lb)   Height: 162.6 cm (64\")     Alert and oriented ×3, no acute distress.  Head is " normocephalic and atraumatic.  Neck is supple there is no thyromegaly or lymphadenopathy  Chest is clear bilaterally there is no added sounds  Regular rate and rhythm, no murmurs  Abdomen is soft and nontender, is nondistended, bowel sounds are positive.  There are 2 hernias at the midline wound.  There is a large hernia over the inferior aspect of the wound that has a large hernia sac.  The hernia is easy reducible.  The hernia defect is approximately 5 x 6 cm.  There is a small hernia that is easy reducible in the superior aspect of the wound.  Well-healed abdominal incision  No clubbing cyanosis or edema in lower or upper extremities     Assessment and plan    The patient is a very pleasant 83-year-old female with 2 incisional hernias, one large and symptomatic but without any signs of bowel obstruction.  He has been having urinary frequency and urgency that I doubt are related to the hernia.  I discussed with the patient about further steps.  I recommend that she undergoes CT scan abdomen and pelvis without contrast to better assess his abdominal wall.  In the meantime the patient needed to start losing weight.  I think she will need to have her abdominal wall hernia fixed due to the size.  Patient understands that she should call immediately to my office or come to emergency room if developed worsening abdominal pain, nausea, vomiting and is unable to use the hernia.    - Plan for CT scan abdomen and pelvis without contrast.  Will call the patient with results  - Encourage weight loss     Cullen Munoz MD  General, Minimally Invasive and Endoscopic Surgery  [facility]     [address], [address]  [place], [place], [address]  P: [phone]  F: [phone]

## 2019-01-19 ENCOUNTER — HOSPITAL ENCOUNTER (OUTPATIENT)
Dept: CT IMAGING | Facility: HOSPITAL | Age: 84
Discharge: HOME | End: 2019-01-19
Attending: SURGERY | Admitting: SURGERY

## 2019-01-19 DIAGNOSIS — K43.2 INCISIONAL HERNIA, WITHOUT OBSTRUCTION OR GANGRENE: ICD-10-CM

## 2019-01-19 PROCEDURE — 74176 CT ABD & PELVIS W/O CONTRAST: CPT

## 2019-01-24 ENCOUNTER — TELEPHONE (OUTPATIENT)
Dept: SURGERY | Facility: CLINIC | Age: 84
End: 2019-01-24

## 2019-01-24 NOTE — TELEPHONE ENCOUNTER
I spoke with the patient regarding her CT scan results.  She has 2 large hernias and will need to have repair.  Discussed with the patient about the need to follow-up in my office for discussion for surgery.  She will need to continue to lose weight.  She verbalized understanding and agree with the plan

## 2019-01-25 ENCOUNTER — TELEPHONE (OUTPATIENT)
Dept: SURGERY | Facility: CLINIC | Age: 84
End: 2019-01-25

## 2019-01-25 NOTE — TELEPHONE ENCOUNTER
"ADDEND  I phoned her and reassured her.  Adamaris Bhandari MD'  Pt called in \"whispering\" asking to very discreet.    She stated you had done her parathyroid in the past and recently she has surg with Dr. Munoz on abd.    She would not give me details of the call, just stating she really wanted to speak with you (still whispering) and she asked if I would be very discreet about the call.  I assured her I would send you this msg, and you would handle it as she wished.    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  "

## 2019-01-30 ENCOUNTER — OFFICE VISIT (OUTPATIENT)
Dept: SURGERY | Facility: CLINIC | Age: 84
End: 2019-01-30

## 2019-01-30 DIAGNOSIS — K43.2 INCISIONAL HERNIA, WITHOUT OBSTRUCTION OR GANGRENE: Primary | ICD-10-CM

## 2019-01-30 PROCEDURE — 99213 OFFICE O/P EST LOW 20 MIN: CPT | Performed by: SURGERY

## 2019-01-30 RX ORDER — CEFAZOLIN SODIUM 2 G/100ML
2 INJECTION, SOLUTION INTRAVENOUS ONCE
Status: CANCELLED | OUTPATIENT
Start: 2019-03-04 | End: 2019-01-30

## 2019-02-01 NOTE — PROGRESS NOTES
"CC: Abdominal wall hernia     HPI: The patient is a very pleasant 83-year-old female that is here today for discussion after undergoing CT scan for evaluation of incisional hernia.  And tedious report fullness over the right lower abdomen    denies any signs of incarceration or strangulation.  CT scan of the abdomen and pelvis was performed that show to different separate hernias at the midline incision    PMH: Anemia, asthma, GERD, glaucoma, hyperlipidemia, hypertension, sleep apnea, thyroid disease, diverticulosis     PSH: Laparoscopic hand-assisted sigmoid colon resection and incidental appendectomy, breast lumpectomy, colonoscopy 2013 and 2017, wisdom tooth extraction     MEDS: Reviewed in Epic.  No blood thinners     ALL: Contrast dye     FH and SH: Father with kidney cancer and mother with tuberculosis.  The patient is an and and is single.  She does not smoke or drink any alcohol.    PE:   Weight: 91.6 kg (202 lb)   Height: 162.6 cm (64\")   BMI 34.7  Alert and oriented ×3, no acute distress.  Head is normocephalic and atraumatic.  Neck is supple there is no thyromegaly or lymphadenopathy  Chest is clear bilaterally there is no added sounds  Regular rate and rhythm, no murmurs  Abdomen is soft and nontender, is nondistended, bowel sounds are positive.  There are 2 hernias at the midline wound.  There is a large hernia over the inferior aspect of the wound that has a large hernia sac.  The hernia is easy reducible.  The hernia defect is approximately 5 x 6 cm.  There is a small hernia that is easy reducible in the superior aspect of the wound.  Well-healed abdominal incision  No clubbing cyanosis or edema in lower or upper extremities    Imaging independently reviewed by me  CT scan abdomen and pelvis 1/15/19: At the prior midline incision there are 2 abdominal wall defects one located over the left side of the incision that one over the right aspect.  The right one has small bowel contents inside and there is " no signs of incarceration or strangulation.  There is evidence of prior sigmoidectomy colectomy with intact anastomosis    Assessment and plan  The patient is a very pleasant 83-year-old female status post emergent open sigmoid colectomy for perforation.  She developed postoperative wound infection requiring a wound back.  She has now developed a large incisional hernia that is symptomatic but there's no signs of incarceration or strangulation.  Discussed with the patient about treatment options.  I show her the imaging a explained her her abdominal wall anatomy.  I recommend that she undergoes open incisional hernia repair with mesh placement and possible component separations.  Risk and benefits of the procedure including bleeding, infection, wound complications, intra-abdominal injuries were discussed in detail with the patient that had time to ask questions, verbalized understanding and agreed with the plan  I discussed with her about the need for her to lose weight since this will decrease the risk of wound related complications and hernia recurrence    - Plan for open incisional hernia repair with component separation and mesh placement  - Continue weight loss  - Surgical scheduling started    Cullen Munoz MD, FACS  General, Minimally Invasive and Endoscopic Surgery  [facility] [address], [address]  [place], [place], [address]  P: [phone]  F: [phone]

## 2019-02-25 ENCOUNTER — APPOINTMENT (OUTPATIENT)
Dept: PREADMISSION TESTING | Facility: HOSPITAL | Age: 84
End: 2019-02-25

## 2019-02-25 VITALS
BODY MASS INDEX: 35.17 KG/M2 | SYSTOLIC BLOOD PRESSURE: 138 MMHG | RESPIRATION RATE: 16 BRPM | DIASTOLIC BLOOD PRESSURE: 75 MMHG | HEART RATE: 60 BPM | TEMPERATURE: 97.9 F | OXYGEN SATURATION: 97 % | WEIGHT: 198.5 LBS | HEIGHT: 63 IN

## 2019-02-25 LAB
ANION GAP SERPL CALCULATED.3IONS-SCNC: 13.2 MMOL/L
BUN BLD-MCNC: 21 MG/DL (ref 8–23)
BUN/CREAT SERPL: 27.6 (ref 7–25)
CALCIUM SPEC-SCNC: 9.2 MG/DL (ref 8.6–10.5)
CHLORIDE SERPL-SCNC: 100 MMOL/L (ref 98–107)
CO2 SERPL-SCNC: 25.8 MMOL/L (ref 22–29)
CREAT BLD-MCNC: 0.76 MG/DL (ref 0.57–1)
DEPRECATED RDW RBC AUTO: 51.5 FL (ref 37–54)
ERYTHROCYTE [DISTWIDTH] IN BLOOD BY AUTOMATED COUNT: 15.1 % (ref 12.3–15.4)
GFR SERPL CREATININE-BSD FRML MDRD: 73 ML/MIN/1.73
GLUCOSE BLD-MCNC: 92 MG/DL (ref 65–99)
HCT VFR BLD AUTO: 38.3 % (ref 34–46.6)
HGB BLD-MCNC: 11.9 G/DL (ref 12–15.9)
MCH RBC QN AUTO: 28.9 PG (ref 26.6–33)
MCHC RBC AUTO-ENTMCNC: 31.1 G/DL (ref 31.5–35.7)
MCV RBC AUTO: 93 FL (ref 79–97)
PLATELET # BLD AUTO: 251 10*3/MM3 (ref 140–450)
PMV BLD AUTO: 9.1 FL (ref 6–12)
POTASSIUM BLD-SCNC: 4.3 MMOL/L (ref 3.5–5.2)
RBC # BLD AUTO: 4.12 10*6/MM3 (ref 3.77–5.28)
SODIUM BLD-SCNC: 139 MMOL/L (ref 136–145)
WBC NRBC COR # BLD: 6.27 10*3/MM3 (ref 3.4–10.8)

## 2019-02-25 PROCEDURE — 93005 ELECTROCARDIOGRAM TRACING: CPT

## 2019-02-25 PROCEDURE — 85027 COMPLETE CBC AUTOMATED: CPT | Performed by: SURGERY

## 2019-02-25 PROCEDURE — 93010 ELECTROCARDIOGRAM REPORT: CPT | Performed by: INTERNAL MEDICINE

## 2019-02-25 PROCEDURE — 36415 COLL VENOUS BLD VENIPUNCTURE: CPT

## 2019-02-25 PROCEDURE — 80048 BASIC METABOLIC PNL TOTAL CA: CPT | Performed by: SURGERY

## 2019-02-25 RX ORDER — SIMVASTATIN 40 MG
40 TABLET ORAL NIGHTLY
COMMUNITY
End: 2019-03-15 | Stop reason: SDUPTHER

## 2019-02-25 RX ORDER — OMEPRAZOLE 20 MG/1
20 CAPSULE, DELAYED RELEASE ORAL DAILY
COMMUNITY
End: 2019-07-08

## 2019-02-25 NOTE — DISCHARGE INSTRUCTIONS
Take the following medications the morning of surgery with a small sip of water:    LOSARTAN  ALBUTEROL  TIMOLOL (TIMOPTIC)  BREO ELLIPTA INHALER    General Instructions:  • Do not eat solid food after midnight the night before surgery.  • You may drink clear liquids day of surgery but must stop at least one hour before your hospital arrival time @ 0500 AM STOP DRINKING!!  • It is beneficial for you to have a clear drink that contains carbohydrates the day of surgery.  We suggest a 12 to 20 ounce bottle of Gatorade or Powerade for non-diabetic patients or a 12 to 20 ounce bottle of G2 or Powerade Zero for diabetic patients.     Clear liquids are liquids you can see through.  Nothing red in color.     Plain water                               Sports drinks  Sodas                                   Gelatin (Jell-O)  Fruit juices without pulp such as white grape juice and apple juice  Popsicles that contain no fruit or yogurt  Tea or coffee (no cream or milk added)  Gatorade / Powerade  G2 / Powerade Zero    • Patients who avoid smoking, chewing tobacco and alcohol for 4 weeks prior to surgery have a reduced risk of post-operative complications.  Quit smoking as many days before surgery as you can.  • Do not smoke, use chewing tobacco or drink alcohol the day of surgery.   • Bring your C-PAP machine.  • Bring any papers given to you in the doctor’s office.  • Wear clean comfortable clothes and socks.  • Do not wear contact lenses or make-up.  Bring a case for your glasses.   • Bring crutches or walker if applicable.  • Remove all piercings.  Leave jewelry and any other valuables at home.  • Hair extensions with metal clips must be removed prior to surgery.  • The Pre-Admission Testing nurse will instruct you to bring medications if unable to obtain an accurate list in Pre-Admission Testing.        Preventing a Surgical Site Infection:  • For 2 to 3 days before surgery, avoid shaving with a razor because the razor can  irritate skin and make it easier to develop an infection.    • Any areas of open skin can increase the risk of a post-operative wound infection by allowing bacteria to enter and travel throughout the body.  Notify your surgeon if you have any skin wounds / rashes even if it is not near the expected surgical site.  The area will need assessed to determine if surgery should be delayed until it is healed.  • The night prior to surgery sleep in a clean bed with clean clothing.  Do not allow pets to sleep with you.  • Shower on the morning of surgery using a fresh bar of anti-bacterial soap (such as Dial) and clean washcloth.  Dry with a clean towel and dress in clean clothing.  • Ask your surgeon if you will be receiving antibiotics prior to surgery.  • Make sure you, your family, and all healthcare providers clean their hands with soap and water or an alcohol based hand  before caring for you or your wound.    Day of surgery: 3- arrive @ 0600 am REPORT TO THE MAIN   Upon arrival, a Pre-op nurse and Anesthesiologist will review your health history, obtain vital signs, and answer questions you may have.  The only belongings needed at this time will be your home medications and  your C-PAP machine.  If you are staying overnight your family can leave the rest of your belongings in the car and bring them to your room later.  A Pre-op nurse will start an IV and you may receive medication in preparation for surgery, including something to help you relax.  Your family will be able to see you in the Pre-op area.  While you are in surgery your family should notify the waiting room  if they leave the waiting room area and provide a contact phone number.    Please be aware that surgery does come with discomfort.  We want to make every effort to control your discomfort so please discuss any uncontrolled symptoms with your nurse.   Your doctor will most likely have prescribed pain medications.      If you  are going home after surgery you will receive individualized written care instructions before being discharged.  A responsible adult must drive you to and from the hospital on the day of your surgery and stay with you for 24 hours.    If you are staying overnight following surgery, you will be transported to your hospital room following the recovery period.  [facility] has all private rooms.    You have received a list of surgical assistants for your reference.  If you have any questions please call Pre-Admission Testing at [phone].  Deductibles and co-payments are collected on the day of service. Please be prepared to pay the required co-pay, deductible or deposit on the day of service as defined by your plan.

## 2019-03-04 ENCOUNTER — ANESTHESIA EVENT (OUTPATIENT)
Dept: PERIOP | Facility: HOSPITAL | Age: 84
End: 2019-03-04

## 2019-03-04 ENCOUNTER — ANESTHESIA (OUTPATIENT)
Dept: PERIOP | Facility: HOSPITAL | Age: 84
End: 2019-03-04

## 2019-03-04 ENCOUNTER — HOSPITAL ENCOUNTER (OUTPATIENT)
Facility: HOSPITAL | Age: 84
Setting detail: OBSERVATION
LOS: 1 days | Discharge: HOME | End: 2019-03-07
Attending: SURGERY | Admitting: SURGERY

## 2019-03-04 DIAGNOSIS — K43.2 INCISIONAL HERNIA, WITHOUT OBSTRUCTION OR GANGRENE: ICD-10-CM

## 2019-03-04 PROCEDURE — 25010000002 KETOROLAC TROMETHAMINE PER 15 MG: Performed by: SURGERY

## 2019-03-04 PROCEDURE — 94799 UNLISTED PULMONARY SVC/PX: CPT

## 2019-03-04 PROCEDURE — 49568 PR IMPLANT MESH HERNIA REPAIR/DEBRIDEMENT CLOSURE: CPT | Performed by: SURGERY

## 2019-03-04 PROCEDURE — 49560 PR REPAIR INCISIONAL HERNIA,REDUCIBLE: CPT | Performed by: SURGERY

## 2019-03-04 PROCEDURE — 25010000002 FENTANYL CITRATE (PF) 100 MCG/2ML SOLUTION: Performed by: NURSE ANESTHETIST, CERTIFIED REGISTERED

## 2019-03-04 PROCEDURE — 25010000003 CEFAZOLIN IN DEXTROSE 2-4 GM/100ML-% SOLUTION: Performed by: SURGERY

## 2019-03-04 PROCEDURE — 25010000002 HYDRALAZINE PER 20 MG: Performed by: NURSE ANESTHETIST, CERTIFIED REGISTERED

## 2019-03-04 PROCEDURE — 25010000003 BUPIVACAINE LIPOSOME 1.3 % SUSPENSION: Performed by: SURGERY

## 2019-03-04 PROCEDURE — 15734 MUSCLE-SKIN GRAFT TRUNK: CPT | Performed by: SURGERY

## 2019-03-04 PROCEDURE — 25010000002 PROMETHAZINE PER 50 MG: Performed by: SURGERY

## 2019-03-04 PROCEDURE — 25010000002 PROPOFOL 10 MG/ML EMULSION: Performed by: NURSE ANESTHETIST, CERTIFIED REGISTERED

## 2019-03-04 PROCEDURE — C1781 MESH (IMPLANTABLE): HCPCS | Performed by: SURGERY

## 2019-03-04 PROCEDURE — 25010000002 HYDROMORPHONE PER 4 MG: Performed by: NURSE ANESTHETIST, CERTIFIED REGISTERED

## 2019-03-04 PROCEDURE — 94640 AIRWAY INHALATION TREATMENT: CPT

## 2019-03-04 PROCEDURE — 25010000002 ONDANSETRON PER 1 MG: Performed by: NURSE ANESTHETIST, CERTIFIED REGISTERED

## 2019-03-04 PROCEDURE — G0378 HOSPITAL OBSERVATION PER HR: HCPCS

## 2019-03-04 PROCEDURE — S0260 H&P FOR SURGERY: HCPCS | Performed by: SURGERY

## 2019-03-04 PROCEDURE — 25010000002 MIDAZOLAM PER 1 MG: Performed by: ANESTHESIOLOGY

## 2019-03-04 PROCEDURE — C9290 INJ, BUPIVACAINE LIPOSOME: HCPCS | Performed by: SURGERY

## 2019-03-04 PROCEDURE — 25010000002 NEOSTIGMINE PER 0.5 MG: Performed by: NURSE ANESTHETIST, CERTIFIED REGISTERED

## 2019-03-04 DEVICE — BARD MESH
Type: IMPLANTABLE DEVICE | Site: ABDOMEN | Status: FUNCTIONAL
Brand: BARD MESH

## 2019-03-04 DEVICE — SEALANT WND FIBRIN TISSEEL PREFIL/SYR/PRIMAFZ 10ML: Type: IMPLANTABLE DEVICE | Site: ABDOMEN | Status: FUNCTIONAL

## 2019-03-04 RX ORDER — NALOXONE HCL 0.4 MG/ML
0.4 VIAL (ML) INJECTION
Status: DISCONTINUED | OUTPATIENT
Start: 2019-03-04 | End: 2019-03-07 | Stop reason: HOSPADM

## 2019-03-04 RX ORDER — ONDANSETRON 2 MG/ML
INJECTION INTRAMUSCULAR; INTRAVENOUS AS NEEDED
Status: DISCONTINUED | OUTPATIENT
Start: 2019-03-04 | End: 2019-03-04 | Stop reason: SURG

## 2019-03-04 RX ORDER — MONTELUKAST SODIUM 10 MG/1
10 TABLET ORAL DAILY
Status: DISCONTINUED | OUTPATIENT
Start: 2019-03-04 | End: 2019-03-06

## 2019-03-04 RX ORDER — PROMETHAZINE HYDROCHLORIDE 12.5 MG/1
12.5 TABLET ORAL EVERY 6 HOURS PRN
Status: DISCONTINUED | OUTPATIENT
Start: 2019-03-04 | End: 2019-03-07 | Stop reason: HOSPADM

## 2019-03-04 RX ORDER — EPHEDRINE SULFATE 50 MG/ML
5 INJECTION, SOLUTION INTRAVENOUS ONCE AS NEEDED
Status: DISCONTINUED | OUTPATIENT
Start: 2019-03-04 | End: 2019-03-04 | Stop reason: HOSPADM

## 2019-03-04 RX ORDER — ROCURONIUM BROMIDE 10 MG/ML
INJECTION, SOLUTION INTRAVENOUS AS NEEDED
Status: DISCONTINUED | OUTPATIENT
Start: 2019-03-04 | End: 2019-03-04 | Stop reason: SURG

## 2019-03-04 RX ORDER — MIDAZOLAM HYDROCHLORIDE 1 MG/ML
1 INJECTION INTRAMUSCULAR; INTRAVENOUS
Status: DISCONTINUED | OUTPATIENT
Start: 2019-03-04 | End: 2019-03-04 | Stop reason: HOSPADM

## 2019-03-04 RX ORDER — HYDROCODONE BITARTRATE AND ACETAMINOPHEN 7.5; 325 MG/1; MG/1
1 TABLET ORAL ONCE AS NEEDED
Status: DISCONTINUED | OUTPATIENT
Start: 2019-03-04 | End: 2019-03-04 | Stop reason: HOSPADM

## 2019-03-04 RX ORDER — HYDRALAZINE HYDROCHLORIDE 20 MG/ML
5 INJECTION INTRAMUSCULAR; INTRAVENOUS
Status: DISCONTINUED | OUTPATIENT
Start: 2019-03-04 | End: 2019-03-04 | Stop reason: HOSPADM

## 2019-03-04 RX ORDER — IPRATROPIUM BROMIDE AND ALBUTEROL SULFATE 2.5; .5 MG/3ML; MG/3ML
3 SOLUTION RESPIRATORY (INHALATION) ONCE
Status: COMPLETED | OUTPATIENT
Start: 2019-03-04 | End: 2019-03-04

## 2019-03-04 RX ORDER — HYDROMORPHONE HYDROCHLORIDE 1 MG/ML
0.5 INJECTION, SOLUTION INTRAMUSCULAR; INTRAVENOUS; SUBCUTANEOUS
Status: DISCONTINUED | OUTPATIENT
Start: 2019-03-04 | End: 2019-03-07 | Stop reason: HOSPADM

## 2019-03-04 RX ORDER — SODIUM CHLORIDE 0.9 % (FLUSH) 0.9 %
1-10 SYRINGE (ML) INJECTION AS NEEDED
Status: DISCONTINUED | OUTPATIENT
Start: 2019-03-04 | End: 2019-03-04 | Stop reason: HOSPADM

## 2019-03-04 RX ORDER — SODIUM CHLORIDE, SODIUM LACTATE, POTASSIUM CHLORIDE, CALCIUM CHLORIDE 600; 310; 30; 20 MG/100ML; MG/100ML; MG/100ML; MG/100ML
9 INJECTION, SOLUTION INTRAVENOUS CONTINUOUS
Status: DISCONTINUED | OUTPATIENT
Start: 2019-03-04 | End: 2019-03-05

## 2019-03-04 RX ORDER — BUPIVACAINE HYDROCHLORIDE AND EPINEPHRINE 5; 5 MG/ML; UG/ML
INJECTION, SOLUTION PERINEURAL AS NEEDED
Status: DISCONTINUED | OUTPATIENT
Start: 2019-03-04 | End: 2019-03-04 | Stop reason: HOSPADM

## 2019-03-04 RX ORDER — SODIUM CHLORIDE 0.9 % (FLUSH) 0.9 %
3 SYRINGE (ML) INJECTION EVERY 12 HOURS SCHEDULED
Status: DISCONTINUED | OUTPATIENT
Start: 2019-03-04 | End: 2019-03-07 | Stop reason: HOSPADM

## 2019-03-04 RX ORDER — ACETAMINOPHEN 325 MG/1
650 TABLET ORAL EVERY 4 HOURS PRN
Status: DISCONTINUED | OUTPATIENT
Start: 2019-03-04 | End: 2019-03-07 | Stop reason: HOSPADM

## 2019-03-04 RX ORDER — HYDROMORPHONE HYDROCHLORIDE 1 MG/ML
0.5 INJECTION, SOLUTION INTRAMUSCULAR; INTRAVENOUS; SUBCUTANEOUS
Status: DISCONTINUED | OUTPATIENT
Start: 2019-03-04 | End: 2019-03-04 | Stop reason: HOSPADM

## 2019-03-04 RX ORDER — SODIUM CHLORIDE, SODIUM LACTATE, POTASSIUM CHLORIDE, CALCIUM CHLORIDE 600; 310; 30; 20 MG/100ML; MG/100ML; MG/100ML; MG/100ML
100 INJECTION, SOLUTION INTRAVENOUS CONTINUOUS
Status: DISCONTINUED | OUTPATIENT
Start: 2019-03-04 | End: 2019-03-05

## 2019-03-04 RX ORDER — HYDROCODONE BITARTRATE AND ACETAMINOPHEN 5; 325 MG/1; MG/1
1 TABLET ORAL EVERY 4 HOURS PRN
Status: DISCONTINUED | OUTPATIENT
Start: 2019-03-04 | End: 2019-03-07 | Stop reason: HOSPADM

## 2019-03-04 RX ORDER — FENTANYL CITRATE 50 UG/ML
INJECTION, SOLUTION INTRAMUSCULAR; INTRAVENOUS AS NEEDED
Status: DISCONTINUED | OUTPATIENT
Start: 2019-03-04 | End: 2019-03-04 | Stop reason: SURG

## 2019-03-04 RX ORDER — LABETALOL HYDROCHLORIDE 5 MG/ML
5 INJECTION, SOLUTION INTRAVENOUS
Status: DISCONTINUED | OUTPATIENT
Start: 2019-03-04 | End: 2019-03-04 | Stop reason: HOSPADM

## 2019-03-04 RX ORDER — CEFAZOLIN SODIUM 2 G/100ML
2 INJECTION, SOLUTION INTRAVENOUS ONCE
Status: COMPLETED | OUTPATIENT
Start: 2019-03-04 | End: 2019-03-04

## 2019-03-04 RX ORDER — POLYETHYLENE GLYCOL 3350 17 G/17G
17 POWDER, FOR SOLUTION ORAL DAILY
Status: DISCONTINUED | OUTPATIENT
Start: 2019-03-04 | End: 2019-03-07 | Stop reason: HOSPADM

## 2019-03-04 RX ORDER — ONDANSETRON 2 MG/ML
4 INJECTION INTRAMUSCULAR; INTRAVENOUS ONCE AS NEEDED
Status: DISCONTINUED | OUTPATIENT
Start: 2019-03-04 | End: 2019-03-04 | Stop reason: HOSPADM

## 2019-03-04 RX ORDER — DIPHENHYDRAMINE HYDROCHLORIDE 50 MG/ML
12.5 INJECTION INTRAMUSCULAR; INTRAVENOUS
Status: DISCONTINUED | OUTPATIENT
Start: 2019-03-04 | End: 2019-03-04 | Stop reason: HOSPADM

## 2019-03-04 RX ORDER — BUDESONIDE AND FORMOTEROL FUMARATE DIHYDRATE 80; 4.5 UG/1; UG/1
2 AEROSOL RESPIRATORY (INHALATION)
Status: DISCONTINUED | OUTPATIENT
Start: 2019-03-04 | End: 2019-03-07 | Stop reason: HOSPADM

## 2019-03-04 RX ORDER — GABAPENTIN 100 MG/1
200 CAPSULE ORAL EVERY 12 HOURS SCHEDULED
Status: DISCONTINUED | OUTPATIENT
Start: 2019-03-04 | End: 2019-03-07 | Stop reason: HOSPADM

## 2019-03-04 RX ORDER — GLYCOPYRROLATE 0.2 MG/ML
INJECTION INTRAMUSCULAR; INTRAVENOUS AS NEEDED
Status: DISCONTINUED | OUTPATIENT
Start: 2019-03-04 | End: 2019-03-04 | Stop reason: SURG

## 2019-03-04 RX ORDER — LIDOCAINE HYDROCHLORIDE 20 MG/ML
INJECTION, SOLUTION INFILTRATION; PERINEURAL AS NEEDED
Status: DISCONTINUED | OUTPATIENT
Start: 2019-03-04 | End: 2019-03-04 | Stop reason: SURG

## 2019-03-04 RX ORDER — FLUMAZENIL 0.1 MG/ML
0.2 INJECTION INTRAVENOUS AS NEEDED
Status: DISCONTINUED | OUTPATIENT
Start: 2019-03-04 | End: 2019-03-04 | Stop reason: HOSPADM

## 2019-03-04 RX ORDER — OXYCODONE AND ACETAMINOPHEN 7.5; 325 MG/1; MG/1
1 TABLET ORAL ONCE AS NEEDED
Status: DISCONTINUED | OUTPATIENT
Start: 2019-03-04 | End: 2019-03-04 | Stop reason: HOSPADM

## 2019-03-04 RX ORDER — PROMETHAZINE HYDROCHLORIDE 25 MG/ML
12.5 INJECTION, SOLUTION INTRAMUSCULAR; INTRAVENOUS ONCE AS NEEDED
Status: DISCONTINUED | OUTPATIENT
Start: 2019-03-04 | End: 2019-03-04 | Stop reason: HOSPADM

## 2019-03-04 RX ORDER — EPHEDRINE SULFATE 50 MG/ML
INJECTION, SOLUTION INTRAVENOUS AS NEEDED
Status: DISCONTINUED | OUTPATIENT
Start: 2019-03-04 | End: 2019-03-04 | Stop reason: SURG

## 2019-03-04 RX ORDER — FENTANYL CITRATE 50 UG/ML
50 INJECTION, SOLUTION INTRAMUSCULAR; INTRAVENOUS
Status: DISCONTINUED | OUTPATIENT
Start: 2019-03-04 | End: 2019-03-04 | Stop reason: HOSPADM

## 2019-03-04 RX ORDER — LEVOTHYROXINE SODIUM 0.03 MG/1
25 TABLET ORAL
Status: DISCONTINUED | OUTPATIENT
Start: 2019-03-05 | End: 2019-03-07 | Stop reason: HOSPADM

## 2019-03-04 RX ORDER — PROMETHAZINE HYDROCHLORIDE 25 MG/1
25 SUPPOSITORY RECTAL ONCE AS NEEDED
Status: DISCONTINUED | OUTPATIENT
Start: 2019-03-04 | End: 2019-03-04 | Stop reason: HOSPADM

## 2019-03-04 RX ORDER — LOSARTAN POTASSIUM 100 MG/1
100 TABLET ORAL EVERY MORNING
Status: DISCONTINUED | OUTPATIENT
Start: 2019-03-05 | End: 2019-03-07 | Stop reason: HOSPADM

## 2019-03-04 RX ORDER — TIMOLOL MALEATE 5 MG/ML
1 SOLUTION/ DROPS OPHTHALMIC EVERY 12 HOURS SCHEDULED
Status: DISCONTINUED | OUTPATIENT
Start: 2019-03-04 | End: 2019-03-07 | Stop reason: HOSPADM

## 2019-03-04 RX ORDER — SCOLOPAMINE TRANSDERMAL SYSTEM 1 MG/1
1 PATCH, EXTENDED RELEASE TRANSDERMAL ONCE
Status: DISCONTINUED | OUTPATIENT
Start: 2019-03-04 | End: 2019-03-04

## 2019-03-04 RX ORDER — ALBUTEROL SULFATE 2.5 MG/3ML
2.5 SOLUTION RESPIRATORY (INHALATION) EVERY 6 HOURS PRN
Status: DISCONTINUED | OUTPATIENT
Start: 2019-03-04 | End: 2019-03-07 | Stop reason: HOSPADM

## 2019-03-04 RX ORDER — PANTOPRAZOLE SODIUM 40 MG/1
40 TABLET, DELAYED RELEASE ORAL EVERY MORNING
Status: DISCONTINUED | OUTPATIENT
Start: 2019-03-05 | End: 2019-03-07 | Stop reason: HOSPADM

## 2019-03-04 RX ORDER — HYDRALAZINE HYDROCHLORIDE 20 MG/ML
INJECTION INTRAMUSCULAR; INTRAVENOUS AS NEEDED
Status: DISCONTINUED | OUTPATIENT
Start: 2019-03-04 | End: 2019-03-04 | Stop reason: SURG

## 2019-03-04 RX ORDER — ACETAMINOPHEN 325 MG/1
650 TABLET ORAL ONCE AS NEEDED
Status: DISCONTINUED | OUTPATIENT
Start: 2019-03-04 | End: 2019-03-04 | Stop reason: HOSPADM

## 2019-03-04 RX ORDER — SODIUM CHLORIDE 0.9 % (FLUSH) 0.9 %
3-10 SYRINGE (ML) INJECTION AS NEEDED
Status: DISCONTINUED | OUTPATIENT
Start: 2019-03-04 | End: 2019-03-07 | Stop reason: HOSPADM

## 2019-03-04 RX ORDER — FAMOTIDINE 10 MG/ML
20 INJECTION, SOLUTION INTRAVENOUS ONCE
Status: COMPLETED | OUTPATIENT
Start: 2019-03-04 | End: 2019-03-04

## 2019-03-04 RX ORDER — PROMETHAZINE HYDROCHLORIDE 25 MG/1
12.5 SUPPOSITORY RECTAL EVERY 6 HOURS PRN
Status: DISCONTINUED | OUTPATIENT
Start: 2019-03-04 | End: 2019-03-07 | Stop reason: HOSPADM

## 2019-03-04 RX ORDER — MIDAZOLAM HYDROCHLORIDE 1 MG/ML
2 INJECTION INTRAMUSCULAR; INTRAVENOUS
Status: DISCONTINUED | OUTPATIENT
Start: 2019-03-04 | End: 2019-03-04 | Stop reason: HOSPADM

## 2019-03-04 RX ORDER — LIDOCAINE HYDROCHLORIDE 10 MG/ML
0.5 INJECTION, SOLUTION EPIDURAL; INFILTRATION; INTRACAUDAL; PERINEURAL ONCE AS NEEDED
Status: DISCONTINUED | OUTPATIENT
Start: 2019-03-04 | End: 2019-03-04 | Stop reason: HOSPADM

## 2019-03-04 RX ORDER — PROMETHAZINE HYDROCHLORIDE 25 MG/1
25 TABLET ORAL ONCE AS NEEDED
Status: DISCONTINUED | OUTPATIENT
Start: 2019-03-04 | End: 2019-03-04 | Stop reason: HOSPADM

## 2019-03-04 RX ORDER — KETOROLAC TROMETHAMINE 30 MG/ML
15 INJECTION, SOLUTION INTRAMUSCULAR; INTRAVENOUS EVERY 6 HOURS PRN
Status: DISCONTINUED | OUTPATIENT
Start: 2019-03-04 | End: 2019-03-07 | Stop reason: HOSPADM

## 2019-03-04 RX ORDER — PROMETHAZINE HYDROCHLORIDE 25 MG/ML
12.5 INJECTION, SOLUTION INTRAMUSCULAR; INTRAVENOUS EVERY 6 HOURS PRN
Status: DISCONTINUED | OUTPATIENT
Start: 2019-03-04 | End: 2019-03-07 | Stop reason: HOSPADM

## 2019-03-04 RX ORDER — DIPHENHYDRAMINE HCL 25 MG
25 CAPSULE ORAL
Status: DISCONTINUED | OUTPATIENT
Start: 2019-03-04 | End: 2019-03-04 | Stop reason: HOSPADM

## 2019-03-04 RX ORDER — MAGNESIUM HYDROXIDE 1200 MG/15ML
LIQUID ORAL AS NEEDED
Status: DISCONTINUED | OUTPATIENT
Start: 2019-03-04 | End: 2019-03-04 | Stop reason: HOSPADM

## 2019-03-04 RX ORDER — PROPOFOL 10 MG/ML
VIAL (ML) INTRAVENOUS AS NEEDED
Status: DISCONTINUED | OUTPATIENT
Start: 2019-03-04 | End: 2019-03-04 | Stop reason: SURG

## 2019-03-04 RX ORDER — NALOXONE HCL 0.4 MG/ML
0.2 VIAL (ML) INJECTION AS NEEDED
Status: DISCONTINUED | OUTPATIENT
Start: 2019-03-04 | End: 2019-03-04 | Stop reason: HOSPADM

## 2019-03-04 RX ADMIN — HYDROCODONE BITARTRATE AND ACETAMINOPHEN 1 TABLET: 5; 325 TABLET ORAL at 14:39

## 2019-03-04 RX ADMIN — MIDAZOLAM 1 MG: 1 INJECTION INTRAMUSCULAR; INTRAVENOUS at 07:35

## 2019-03-04 RX ADMIN — ONDANSETRON 4 MG: 2 INJECTION INTRAMUSCULAR; INTRAVENOUS at 10:18

## 2019-03-04 RX ADMIN — FENTANYL CITRATE 25 MCG: 50 INJECTION INTRAMUSCULAR; INTRAVENOUS at 08:56

## 2019-03-04 RX ADMIN — ROCURONIUM BROMIDE 10 MG: 10 INJECTION INTRAVENOUS at 09:19

## 2019-03-04 RX ADMIN — EPHEDRINE SULFATE 10 MG: 50 INJECTION INTRAMUSCULAR; INTRAVENOUS; SUBCUTANEOUS at 08:15

## 2019-03-04 RX ADMIN — FENTANYL CITRATE 50 MCG: 50 INJECTION, SOLUTION INTRAMUSCULAR; INTRAVENOUS at 11:19

## 2019-03-04 RX ADMIN — LIDOCAINE HYDROCHLORIDE 50 MG: 20 INJECTION, SOLUTION INFILTRATION; PERINEURAL at 08:06

## 2019-03-04 RX ADMIN — SODIUM CHLORIDE, POTASSIUM CHLORIDE, SODIUM LACTATE AND CALCIUM CHLORIDE 9 ML/HR: 600; 310; 30; 20 INJECTION, SOLUTION INTRAVENOUS at 07:35

## 2019-03-04 RX ADMIN — SCOPALAMINE 1 PATCH: 1 PATCH, EXTENDED RELEASE TRANSDERMAL at 07:35

## 2019-03-04 RX ADMIN — TIMOLOL MALEATE 1 DROP: 5 SOLUTION OPHTHALMIC at 23:03

## 2019-03-04 RX ADMIN — KETOROLAC TROMETHAMINE 15 MG: 30 INJECTION, SOLUTION INTRAMUSCULAR at 19:50

## 2019-03-04 RX ADMIN — SODIUM CHLORIDE, POTASSIUM CHLORIDE, SODIUM LACTATE AND CALCIUM CHLORIDE: 600; 310; 30; 20 INJECTION, SOLUTION INTRAVENOUS at 09:50

## 2019-03-04 RX ADMIN — EPHEDRINE SULFATE 10 MG: 50 INJECTION INTRAMUSCULAR; INTRAVENOUS; SUBCUTANEOUS at 08:23

## 2019-03-04 RX ADMIN — TIMOLOL MALEATE 1 DROP: 5 SOLUTION OPHTHALMIC at 14:40

## 2019-03-04 RX ADMIN — HYDROCODONE BITARTRATE AND ACETAMINOPHEN 1 TABLET: 5; 325 TABLET ORAL at 23:28

## 2019-03-04 RX ADMIN — FAMOTIDINE 20 MG: 10 INJECTION INTRAVENOUS at 07:35

## 2019-03-04 RX ADMIN — FENTANYL CITRATE 50 MCG: 50 INJECTION INTRAMUSCULAR; INTRAVENOUS at 08:44

## 2019-03-04 RX ADMIN — IPRATROPIUM BROMIDE AND ALBUTEROL SULFATE 3 ML: 2.5; .5 SOLUTION RESPIRATORY (INHALATION) at 07:40

## 2019-03-04 RX ADMIN — ROCURONIUM BROMIDE 40 MG: 10 INJECTION INTRAVENOUS at 08:06

## 2019-03-04 RX ADMIN — HYDRALAZINE HYDROCHLORIDE 5 MG: 20 INJECTION INTRAMUSCULAR; INTRAVENOUS at 08:56

## 2019-03-04 RX ADMIN — SODIUM CHLORIDE, PRESERVATIVE FREE 3 ML: 5 INJECTION INTRAVENOUS at 23:03

## 2019-03-04 RX ADMIN — EPHEDRINE SULFATE 10 MG: 50 INJECTION INTRAMUSCULAR; INTRAVENOUS; SUBCUTANEOUS at 09:19

## 2019-03-04 RX ADMIN — SODIUM CHLORIDE, POTASSIUM CHLORIDE, SODIUM LACTATE AND CALCIUM CHLORIDE 100 ML/HR: 600; 310; 30; 20 INJECTION, SOLUTION INTRAVENOUS at 12:27

## 2019-03-04 RX ADMIN — MONTELUKAST SODIUM 10 MG: 10 TABLET, FILM COATED ORAL at 14:39

## 2019-03-04 RX ADMIN — SODIUM CHLORIDE, POTASSIUM CHLORIDE, SODIUM LACTATE AND CALCIUM CHLORIDE 100 ML/HR: 600; 310; 30; 20 INJECTION, SOLUTION INTRAVENOUS at 23:02

## 2019-03-04 RX ADMIN — HYDROMORPHONE HYDROCHLORIDE 0.5 MG: 1 INJECTION, SOLUTION INTRAMUSCULAR; INTRAVENOUS; SUBCUTANEOUS at 11:12

## 2019-03-04 RX ADMIN — FENTANYL CITRATE 50 MCG: 50 INJECTION, SOLUTION INTRAMUSCULAR; INTRAVENOUS at 11:31

## 2019-03-04 RX ADMIN — PROMETHAZINE HYDROCHLORIDE 12.5 MG: 25 INJECTION INTRAMUSCULAR; INTRAVENOUS at 12:48

## 2019-03-04 RX ADMIN — GLYCOPYRROLATE 0.6 MG: 0.2 INJECTION INTRAMUSCULAR; INTRAVENOUS at 10:44

## 2019-03-04 RX ADMIN — NEOSTIGMINE METHYLSULFATE 3 MG: 1 INJECTION INTRAMUSCULAR; INTRAVENOUS; SUBCUTANEOUS at 10:44

## 2019-03-04 RX ADMIN — ROCURONIUM BROMIDE 10 MG: 10 INJECTION INTRAVENOUS at 08:51

## 2019-03-04 RX ADMIN — PROPOFOL 100 MG: 10 INJECTION, EMULSION INTRAVENOUS at 08:06

## 2019-03-04 RX ADMIN — GABAPENTIN 200 MG: 100 CAPSULE ORAL at 23:07

## 2019-03-04 RX ADMIN — GABAPENTIN 200 MG: 100 CAPSULE ORAL at 14:40

## 2019-03-04 RX ADMIN — FENTANYL CITRATE 50 MCG: 50 INJECTION INTRAMUSCULAR; INTRAVENOUS at 08:06

## 2019-03-04 RX ADMIN — CEFAZOLIN SODIUM 2 G: 2 INJECTION, SOLUTION INTRAVENOUS at 08:06

## 2019-03-04 RX ADMIN — GLYCOPYRROLATE 0.2 MG: 0.2 INJECTION INTRAMUSCULAR; INTRAVENOUS at 08:06

## 2019-03-04 NOTE — ANESTHESIA PROCEDURE NOTES
Airway  Urgency: elective    Date/Time: 3/4/2019 8:23 AM  End Time:3/4/2019 8:23 AM  Airway not difficult    General Information and Staff    Patient location during procedure: OR  Anesthesiologist: Darrius Mancera MD  CRNA: Shaneka Rodriges CRNA    Indications and Patient Condition  Indications for airway management: airway protection    Preoxygenated: yes  MILS maintained throughout  Mask difficulty assessment: 1 - vent by mask    Final Airway Details  Final airway type: endotracheal airway      Successful airway: ETT  Cuffed: yes   Successful intubation technique: direct laryngoscopy  Endotracheal tube insertion site: oral  Blade: Oswald  Blade size: 2  ETT size (mm): 7.0  Cormack-Lehane Classification: grade I - full view of glottis  Placement verified by: chest auscultation and capnometry   Measured from: lips  Number of attempts at approach: 1    Additional Comments  Atraumatic, Secured after verification of placement

## 2019-03-04 NOTE — PLAN OF CARE
Problem: Patient Care Overview  Goal: Plan of Care Review  Outcome: Ongoing (interventions implemented as appropriate)   03/04/19 0831   Coping/Psychosocial   Plan of Care Reviewed With patient   Plan of Care Review   Progress no change   OTHER   Outcome Summary Surgery Admit - Incisional Hernia Repair. Midline Incision c/d/i. ITALO x2. Reeder Catheter in Place. Phenergan x1 & Norco x1. Clear Liquid Diet - tolerating well - diet advanced as tolerated. Up w/ Assist x1. LRs @ 100 mL/hr.      Goal: Individualization and Mutuality  Outcome: Ongoing (interventions implemented as appropriate)    Goal: Discharge Needs Assessment  Outcome: Ongoing (interventions implemented as appropriate)    Goal: Interprofessional Rounds/Family Conf  Outcome: Ongoing (interventions implemented as appropriate)      Problem: Surgery Nonspecified (Adult)  Goal: Signs and Symptoms of Listed Potential Problems Will be Absent, Minimized or Managed (Surgery Nonspecified)  Outcome: Ongoing (interventions implemented as appropriate)    Goal: Anesthesia/Sedation Recovery  Outcome: Ongoing (interventions implemented as appropriate)      Problem: Pain, Acute (Adult)  Goal: Identify Related Risk Factors and Signs and Symptoms  Outcome: Ongoing (interventions implemented as appropriate)    Goal: Acceptable Pain Control/Comfort Level  Outcome: Ongoing (interventions implemented as appropriate)

## 2019-03-04 NOTE — OP NOTE
PREOPERATIVE DIAGNOSIS: Incisional hernia.    POSTOPERATIVE DIAGNOSIS: Incisional hernia.    PROCEDURE PERFORMED: Open incisional hernia repair with bilateral component separation and retrorectus mesh placement.    PRIMARY SURGEON: Cullen Munoz MD    ASSISTANT: Dre Godinez CSA    ANESTHESIA:  General.    ESTIMATED BLOOD LOSS: 20 mL.    COMPLICATIONS: None.    FINDINGS:  Large incisional hernia.    CONDITION: Patient stable, to recovery.    INDICATIONS: An 83-year-old female that underwent laparoscopic unassisted sigmoid colectomy with anastomosis for perforated diverticulitis. She developed a wound infection postop and later she developed a hernia. She was evaluated in my office and I recommended that she undergo open incisional hernia repair with bilateral component separation and mesh placement. Risks and benefits of the procedure including bleeding, infection, mesh complications, mesh infection, chronic abdominal pain, and risks of anesthesia. She verbalized understanding and agreed with the plan.    DESCRIPTION OF PROCEDURE: The patient was taken to operating room where he was placed on the OR table in supine position. Preoperative antibiotics were given and SCDs were placed. Patient underwent general endotracheal anesthesia. Patient's abdomen was then prepped and draped in usual sterile fashion. Incision was performed from the supraumbilical to the infraumbilical area, approximately 10 cm. Incision was carried down through the subcutaneous tissue. The abdomen was entered and the incision was extended through the length of the skin incision. There was evidence of small bowel attached to the right side of the fascia and there was a large hernia over the right side. I started by taking down the small bowel attached to abdominal wall with blunt, sharp dissection and Bovie electrocautery. This was performed without any major problem. Then the bowel was assessed and  was found to be healthy. I then proceeded to  create flaps of the posterior rectus sheath by incising the posterior rectus sheath 0.5 cm away from the linea alba. The posterior rectus space was entered and the posterior rectus sheath was transected superiorly and inferiorly. The dissection was carried laterally to a level of the perforators of the epigastric artery, inferior to the space of retzius and superior to the epigastrium. I entered the retrorectus space on the right side of the abdomen and dissected the posterior rectus sheath from the rectus muscle all the way laterally to the level of the perforators. Dissection continued inferiorly down to the level of the pubic bone and space of retzius. Dissection continued all the way up to approximately 5 cm of rectus sheath above the level of the incision. Then it was obvious that the posterior rectus sheath was going to close without tension and I irrigated the abdomen. I again checked the bowel and there was no evidence of injury. I then closed the posterior rectus sheath with running 2-0 Vicryl suture from superior and inferior portion. Irrigation was performed with bacitracin and then 25 x 15 cm Bard polypropylene mesh was placed in the retrorectus space. It was secured to the Joshua ligament inferiorly and to abdominal wall fascia superiorly. Tisseel was used to spray over the mesh that was laying flat in the retrorectus space. Then a 19-Kittitian Avinash drain was placed in the rectus space and brought out through the right upper quadrant. It was sutured in place with 2-0 Vicryl suture. Happy with this, I proceeded to transect all the hernia sacs in both right and left side of the abdominal wall and create subcutaneous flaps to find healthy anterior rectus fascia. I injected exparel at the fascia and subcutaneous tissue.  I then closed the abdominal wall fascia with running #1 PDS suture from inferior and superior portion. A 15-Kittitian Avinash drain was placed in the subcutaneous tissue and brought out through  the left upper quadrant Then subcutaneous tissue and skin over the right side was removed since this was redundant. The incision was then closed in 2 layers with 2 interrupted 2-0 Vicryl and staplers. This was sutured in place with 2-0 Vicryl suture. Dressings were applied. Patient tolerated the procedure well and she was sent to recovery area in stable condition. Instrument and sponge count was correct

## 2019-03-04 NOTE — H&P
"CC: Abdominal wall hernia     HPI: The patient is a very pleasant 83-year-old female that is here today for discussion after undergoing CT scan for evaluation of incisional hernia.  And tedious report fullness over the right lower abdomen    denies any signs of incarceration or strangulation.  CT scan of the abdomen and pelvis was performed that show to different separate hernias at the midline incision     PMH: Anemia, asthma, GERD, glaucoma, hyperlipidemia, hypertension, sleep apnea, thyroid disease, diverticulosis     PSH: Laparoscopic hand-assisted sigmoid colon resection and incidental appendectomy, breast lumpectomy, colonoscopy 2013 and 2017, wisdom tooth extraction     MEDS: Reviewed in Epic.  No blood thinners     ALL: Contrast dye     FH and SH: Father with kidney cancer and mother with tuberculosis.  The patient is an and and is single.  She does not smoke or drink any alcohol.     PE:   Weight: 91.6 kg (202 lb)   Height: 162.6 cm (64\")   BMI 34.7  Alert and oriented ×3, no acute distress.  Head is normocephalic and atraumatic.  Neck is supple there is no thyromegaly or lymphadenopathy  Chest is clear bilaterally there is no added sounds  Regular rate and rhythm, no murmurs  Abdomen is soft and nontender, is nondistended, bowel sounds are positive.  There are 2 hernias at the midline wound.  There is a large hernia over the inferior aspect of the wound that has a large hernia sac.  The hernia is easy reducible.  The hernia defect is approximately 5 x 6 cm.  There is a small hernia that is easy reducible in the superior aspect of the wound.  Well-healed abdominal incision  No clubbing cyanosis or edema in lower or upper extremities     Imaging independently reviewed by me  CT scan abdomen and pelvis 1/15/19: At the prior midline incision there are 2 abdominal wall defects one located over the left side of the incision that one over the right aspect.  The right one has small bowel contents inside and there " is no signs of incarceration or strangulation.  There is evidence of prior sigmoidectomy colectomy with intact anastomosis     Assessment and plan  The patient is a very pleasant 83-year-old female status post emergent open sigmoid colectomy for perforation.  She developed postoperative wound infection requiring a wound back.  She has now developed a large incisional hernia that is symptomatic but there's no signs of incarceration or strangulation.  Discussed with the patient about treatment options.  I show her the imaging a explained her her abdominal wall anatomy.  I recommend that she undergoes open incisional hernia repair with mesh placement and possible component separations.  Risk and benefits of the procedure including bleeding, infection, wound complications, intra-abdominal injuries were discussed in detail with the patient that had time to ask questions, verbalized understanding and agreed with the plan  I discussed with her about the need for her to lose weight since this will decrease the risk of wound related complications and hernia recurrence     - Plan for open incisional hernia repair with component separation and mesh placement

## 2019-03-04 NOTE — BRIEF OP NOTE
VENTRAL HERNIA WITH COMPONENT SEPARATION  Progress Note    Ghada Ryan  3/4/2019    Pre-op Diagnosis:   Incisional hernia, without obstruction or gangrene [K43.2]       Post-Op Diagnosis Codes:     * Incisional hernia, without obstruction or gangrene [K43.2]    Procedure/CPT® Codes:      Procedure(s):  Incisional hernia repair with bilateral component separation and mesh placement    Surgeon(s):  Cullen Munoz MD    Anesthesia: General    Staff:   Circulator: Mia Marinelli RN; Jaclyn Jack RN  Scrub Person: Paige Winston  Assistant: Dre Godinez CSA    Estimated Blood Loss: 20 mL    Urine Voided: 300 mL    Specimens:                None      Drains:   Closed/Suction Drain Right Abdomen Bulb 19 Fr. (Active)       Closed/Suction Drain Left Abdomen Bulb 15 Fr. (Active)       Urethral Catheter Non-latex 16 Fr. (Active)       Findings: Large incisional hernia    Complications: None      Cullen Munoz MD     Date: 3/4/2019  Time: 11:04 AM

## 2019-03-04 NOTE — ANESTHESIA PREPROCEDURE EVALUATION
Anesthesia Evaluation     Patient summary reviewed and Nursing notes reviewed   NPO Solid Status: > 8 hours  NPO Liquid Status: > 8 hours           Airway   Mallampati: II  No difficulty expected  Dental - normal exam     Pulmonary     breath sounds clear to auscultation  (+) asthma, sleep apnea, decreased breath sounds,   Cardiovascular     Rhythm: regular    (+) hypertension, PVD, DVT, hyperlipidemia,       Neuro/Psych  (+) psychiatric history Anxiety,     GI/Hepatic/Renal/Endo    (+) obesity,  GERD,  hypothyroidism,     Musculoskeletal     Abdominal   (+) obese,    Substance History      OB/GYN          Other   (+) arthritis                     Anesthesia Plan    ASA 3     general     intravenous induction   Anesthetic plan, all risks, benefits, and alternatives have been provided, discussed and informed consent has been obtained with: patient.

## 2019-03-05 LAB
ANION GAP SERPL CALCULATED.3IONS-SCNC: 8.6 MMOL/L
BASOPHILS # BLD AUTO: 0.02 10*3/MM3 (ref 0–0.2)
BASOPHILS NFR BLD AUTO: 0.3 % (ref 0–1.5)
BUN BLD-MCNC: 9 MG/DL (ref 8–23)
BUN/CREAT SERPL: 14.3 (ref 7–25)
CALCIUM SPEC-SCNC: 8.2 MG/DL (ref 8.6–10.5)
CHLORIDE SERPL-SCNC: 97 MMOL/L (ref 98–107)
CO2 SERPL-SCNC: 26.4 MMOL/L (ref 22–29)
CREAT BLD-MCNC: 0.63 MG/DL (ref 0.57–1)
DEPRECATED RDW RBC AUTO: 49.6 FL (ref 37–54)
EOSINOPHIL # BLD AUTO: 0.07 10*3/MM3 (ref 0–0.4)
EOSINOPHIL NFR BLD AUTO: 1 % (ref 0.3–6.2)
ERYTHROCYTE [DISTWIDTH] IN BLOOD BY AUTOMATED COUNT: 14.7 % (ref 12.3–15.4)
GFR SERPL CREATININE-BSD FRML MDRD: 90 ML/MIN/1.73
GLUCOSE BLD-MCNC: 106 MG/DL (ref 65–99)
HCT VFR BLD AUTO: 32.4 % (ref 34–46.6)
HGB BLD-MCNC: 10.4 G/DL (ref 12–15.9)
IMM GRANULOCYTES # BLD AUTO: 0.02 10*3/MM3 (ref 0–0.05)
IMM GRANULOCYTES NFR BLD AUTO: 0.3 % (ref 0–0.5)
LYMPHOCYTES # BLD AUTO: 1.12 10*3/MM3 (ref 0.7–3.1)
LYMPHOCYTES NFR BLD AUTO: 16.4 % (ref 19.6–45.3)
MCH RBC QN AUTO: 29.3 PG (ref 26.6–33)
MCHC RBC AUTO-ENTMCNC: 32.1 G/DL (ref 31.5–35.7)
MCV RBC AUTO: 91.3 FL (ref 79–97)
MONOCYTES # BLD AUTO: 0.52 10*3/MM3 (ref 0.1–0.9)
MONOCYTES NFR BLD AUTO: 7.6 % (ref 5–12)
NEUTROPHILS # BLD AUTO: 5.09 10*3/MM3 (ref 1.4–7)
NEUTROPHILS NFR BLD AUTO: 74.4 % (ref 42.7–76)
NRBC BLD AUTO-RTO: 0 /100 WBC (ref 0–0)
PLATELET # BLD AUTO: 210 10*3/MM3 (ref 140–450)
PMV BLD AUTO: 9.3 FL (ref 6–12)
POTASSIUM BLD-SCNC: 3.6 MMOL/L (ref 3.5–5.2)
RBC # BLD AUTO: 3.55 10*6/MM3 (ref 3.77–5.28)
SODIUM BLD-SCNC: 132 MMOL/L (ref 136–145)
WBC NRBC COR # BLD: 6.84 10*3/MM3 (ref 3.4–10.8)

## 2019-03-05 PROCEDURE — 80048 BASIC METABOLIC PNL TOTAL CA: CPT | Performed by: SURGERY

## 2019-03-05 PROCEDURE — 94640 AIRWAY INHALATION TREATMENT: CPT

## 2019-03-05 PROCEDURE — 25010000002 ENOXAPARIN PER 10 MG: Performed by: SURGERY

## 2019-03-05 PROCEDURE — G0378 HOSPITAL OBSERVATION PER HR: HCPCS

## 2019-03-05 PROCEDURE — 94799 UNLISTED PULMONARY SVC/PX: CPT

## 2019-03-05 PROCEDURE — 99024 POSTOP FOLLOW-UP VISIT: CPT | Performed by: SURGERY

## 2019-03-05 PROCEDURE — 85025 COMPLETE CBC W/AUTO DIFF WBC: CPT | Performed by: SURGERY

## 2019-03-05 PROCEDURE — 25010000002 KETOROLAC TROMETHAMINE PER 15 MG: Performed by: SURGERY

## 2019-03-05 PROCEDURE — 25010000002 PROMETHAZINE PER 50 MG: Performed by: SURGERY

## 2019-03-05 RX ORDER — DEXTROSE AND SODIUM CHLORIDE 5; .45 G/100ML; G/100ML
100 INJECTION, SOLUTION INTRAVENOUS CONTINUOUS
Status: DISCONTINUED | OUTPATIENT
Start: 2019-03-05 | End: 2019-03-06

## 2019-03-05 RX ADMIN — SODIUM CHLORIDE, PRESERVATIVE FREE 3 ML: 5 INJECTION INTRAVENOUS at 21:31

## 2019-03-05 RX ADMIN — TIMOLOL MALEATE 1 DROP: 5 SOLUTION OPHTHALMIC at 08:17

## 2019-03-05 RX ADMIN — ENOXAPARIN SODIUM 30 MG: 30 INJECTION SUBCUTANEOUS at 11:06

## 2019-03-05 RX ADMIN — POLYETHYLENE GLYCOL 3350 17 G: 17 POWDER, FOR SOLUTION ORAL at 08:16

## 2019-03-05 RX ADMIN — KETOROLAC TROMETHAMINE 15 MG: 30 INJECTION, SOLUTION INTRAMUSCULAR at 08:14

## 2019-03-05 RX ADMIN — HYDROCODONE BITARTRATE AND ACETAMINOPHEN 1 TABLET: 5; 325 TABLET ORAL at 11:06

## 2019-03-05 RX ADMIN — GABAPENTIN 200 MG: 100 CAPSULE ORAL at 08:14

## 2019-03-05 RX ADMIN — PROMETHAZINE HYDROCHLORIDE 12.5 MG: 25 INJECTION INTRAMUSCULAR; INTRAVENOUS at 18:01

## 2019-03-05 RX ADMIN — DEXTROSE AND SODIUM CHLORIDE 100 ML/HR: 5; 450 INJECTION, SOLUTION INTRAVENOUS at 18:01

## 2019-03-05 RX ADMIN — LOSARTAN POTASSIUM 100 MG: 100 TABLET, FILM COATED ORAL at 08:13

## 2019-03-05 RX ADMIN — PANTOPRAZOLE SODIUM 40 MG: 40 TABLET, DELAYED RELEASE ORAL at 05:16

## 2019-03-05 RX ADMIN — SERTRALINE 50 MG: 50 TABLET, FILM COATED ORAL at 21:28

## 2019-03-05 RX ADMIN — TIMOLOL MALEATE 1 DROP: 5 SOLUTION OPHTHALMIC at 21:29

## 2019-03-05 RX ADMIN — ENOXAPARIN SODIUM 30 MG: 30 INJECTION SUBCUTANEOUS at 23:47

## 2019-03-05 RX ADMIN — MONTELUKAST SODIUM 10 MG: 10 TABLET, FILM COATED ORAL at 08:14

## 2019-03-05 RX ADMIN — ACETAMINOPHEN 650 MG: 325 TABLET, FILM COATED ORAL at 18:02

## 2019-03-05 RX ADMIN — GABAPENTIN 200 MG: 100 CAPSULE ORAL at 21:29

## 2019-03-05 RX ADMIN — LEVOTHYROXINE SODIUM 25 MCG: 25 TABLET ORAL at 05:15

## 2019-03-05 NOTE — PROGRESS NOTES
Postoperative day 1 status post open incisional hernia repair with component separation and mesh placement    S: No evidence overnight.  Pain is well controlled.  She has been tolerating a liquid diet.  No bowel function yet    O:   Vitals:    03/04/19 2131 03/05/19 0508 03/05/19 0735 03/05/19 0832   BP: 106/67 105/63  92/62   BP Location: Right arm Right arm  Right arm   Patient Position: Lying Lying  Lying   Pulse: 62 60 64 58   Resp: 18 18 18 18   Temp: 96.6 °F (35.9 °C) 96.9 °F (36.1 °C)  97.3 °F (36.3 °C)   TempSrc: Oral Oral  Oral   SpO2: 96% 97% 97% 95%   Weight:       Height:         Left drain 15  Right drain 220  Alert and oriented x3, no acute distress  Chest clear bilaterally, regular rate rhythm  Abdomen soft, slightly tender to palpation, nondistended, Avinash drain in place with serosanguineous fluid, incision clean dry and intact  No edemas    Hemoglobin 10.4  White blood cell normal  Sodium 132  Otherwise normal    Postoperative day 1 status post open incisional hernia repair with component separation and mesh placement, no issues, recovering as expected    -Remove Reeder catheter  -Out of bed to chair and ambulate  -DC IV fluids  -Regular diet  -Place abdominal binder  -We will keep drain in place for now  -SCDs and Christianx    Cullen Munoz MD, FACS  General, Minimally Invasive and Endoscopic Surgery  [facility]    [address], [address]  [place], [place], [address]  P: [phone]  F: [phone]

## 2019-03-05 NOTE — PLAN OF CARE
Problem: Patient Care Overview  Goal: Plan of Care Review  Outcome: Ongoing (interventions implemented as appropriate)   03/05/19 3762   Coping/Psychosocial   Plan of Care Reviewed With patient   Plan of Care Review   Progress no change   OTHER   Outcome Summary Pt dressing c/d/i. Norco x1. Denies nausea. Appeared to have a restful night. VSS. Monitoring.       Problem: Pain, Acute (Adult)  Goal: Acceptable Pain Control/Comfort Level  Outcome: Ongoing (interventions implemented as appropriate)

## 2019-03-05 NOTE — PLAN OF CARE
Problem: Patient Care Overview  Goal: Plan of Care Review  Outcome: Ongoing (interventions implemented as appropriate)   03/05/19 4536   Coping/Psychosocial   Plan of Care Reviewed With patient   Plan of Care Review   Progress improving   OTHER   Outcome Summary POD 1 - Incisional Hernia Repair. VSS. Up to Chair for meals. Ambulated in Hallway x4 - patient refused gait belt and walker assistance. Incentive Spirometer Teaching provided. Reeder Catheter discontinued. ITALO x2 in place.      Goal: Individualization and Mutuality  Outcome: Ongoing (interventions implemented as appropriate)    Goal: Discharge Needs Assessment  Outcome: Ongoing (interventions implemented as appropriate)    Goal: Interprofessional Rounds/Family Conf  Outcome: Ongoing (interventions implemented as appropriate)      Problem: Surgery Nonspecified (Adult)  Goal: Signs and Symptoms of Listed Potential Problems Will be Absent, Minimized or Managed (Surgery Nonspecified)  Outcome: Ongoing (interventions implemented as appropriate)    Goal: Anesthesia/Sedation Recovery  Outcome: Ongoing (interventions implemented as appropriate)      Problem: Pain, Acute (Adult)  Goal: Identify Related Risk Factors and Signs and Symptoms  Outcome: Ongoing (interventions implemented as appropriate)    Goal: Acceptable Pain Control/Comfort Level  Outcome: Ongoing (interventions implemented as appropriate)      Problem: Fall Risk (Adult)  Goal: Identify Related Risk Factors and Signs and Symptoms  Outcome: Ongoing (interventions implemented as appropriate)    Goal: Absence of Fall  Outcome: Ongoing (interventions implemented as appropriate)

## 2019-03-06 PROCEDURE — 99024 POSTOP FOLLOW-UP VISIT: CPT | Performed by: SURGERY

## 2019-03-06 PROCEDURE — G0378 HOSPITAL OBSERVATION PER HR: HCPCS

## 2019-03-06 PROCEDURE — 25010000002 ENOXAPARIN PER 10 MG: Performed by: SURGERY

## 2019-03-06 PROCEDURE — 94799 UNLISTED PULMONARY SVC/PX: CPT

## 2019-03-06 RX ORDER — MONTELUKAST SODIUM 10 MG/1
10 TABLET ORAL NIGHTLY
Status: DISCONTINUED | OUTPATIENT
Start: 2019-03-06 | End: 2019-03-07 | Stop reason: HOSPADM

## 2019-03-06 RX ADMIN — HYDROCODONE BITARTRATE AND ACETAMINOPHEN 1 TABLET: 5; 325 TABLET ORAL at 13:22

## 2019-03-06 RX ADMIN — GABAPENTIN 200 MG: 100 CAPSULE ORAL at 21:57

## 2019-03-06 RX ADMIN — SODIUM CHLORIDE, PRESERVATIVE FREE 3 ML: 5 INJECTION INTRAVENOUS at 08:40

## 2019-03-06 RX ADMIN — ENOXAPARIN SODIUM 30 MG: 30 INJECTION SUBCUTANEOUS at 11:11

## 2019-03-06 RX ADMIN — SERTRALINE 50 MG: 50 TABLET, FILM COATED ORAL at 21:57

## 2019-03-06 RX ADMIN — LOSARTAN POTASSIUM 100 MG: 100 TABLET, FILM COATED ORAL at 06:03

## 2019-03-06 RX ADMIN — PANTOPRAZOLE SODIUM 40 MG: 40 TABLET, DELAYED RELEASE ORAL at 06:03

## 2019-03-06 RX ADMIN — SODIUM CHLORIDE, PRESERVATIVE FREE 3 ML: 5 INJECTION INTRAVENOUS at 21:57

## 2019-03-06 RX ADMIN — TIMOLOL MALEATE 1 DROP: 5 SOLUTION OPHTHALMIC at 21:57

## 2019-03-06 RX ADMIN — HYDROCODONE BITARTRATE AND ACETAMINOPHEN 1 TABLET: 5; 325 TABLET ORAL at 04:59

## 2019-03-06 RX ADMIN — DEXTROSE AND SODIUM CHLORIDE 100 ML/HR: 5; 450 INJECTION, SOLUTION INTRAVENOUS at 06:03

## 2019-03-06 RX ADMIN — HYDROCODONE BITARTRATE AND ACETAMINOPHEN 1 TABLET: 5; 325 TABLET ORAL at 21:57

## 2019-03-06 RX ADMIN — GABAPENTIN 200 MG: 100 CAPSULE ORAL at 08:38

## 2019-03-06 RX ADMIN — MONTELUKAST SODIUM 10 MG: 10 TABLET, FILM COATED ORAL at 21:57

## 2019-03-06 RX ADMIN — POLYETHYLENE GLYCOL 3350 17 G: 17 POWDER, FOR SOLUTION ORAL at 08:38

## 2019-03-06 RX ADMIN — LEVOTHYROXINE SODIUM 25 MCG: 25 TABLET ORAL at 05:00

## 2019-03-06 RX ADMIN — TIMOLOL MALEATE 1 DROP: 5 SOLUTION OPHTHALMIC at 08:38

## 2019-03-06 RX ADMIN — ENOXAPARIN SODIUM 30 MG: 30 INJECTION SUBCUTANEOUS at 22:01

## 2019-03-06 NOTE — PLAN OF CARE
Problem: Patient Care Overview  Goal: Plan of Care Review   03/06/19 0241   Coping/Psychosocial   Plan of Care Reviewed With patient   Plan of Care Review   Progress no change   OTHER   Outcome Summary Patient had an incisional hernia repair. POD 2. VSS. She had trouble urinating in the beginning of the shift but now she is voiding adequately without the use of a catheter. Needs assistance when getting up to the bathroom. ITALO x2 in place. New IV placed this morning in left forearm and is infusing. Did not report to be in any pain so far. Will continue to monitor.        Problem: Fall Risk (Adult)  Goal: Absence of Fall  Outcome: Ongoing (interventions implemented as appropriate)

## 2019-03-06 NOTE — PROGRESS NOTES
Postoperative day 2 status post open incisional hernia repair with component separation and mesh placement    S: No issues, tolerating diet. No bowel movement yet    O:   Vitals:    03/05/19 2105 03/06/19 0012 03/06/19 0449 03/06/19 0730   BP: 145/78 144/77 163/84 121/67   BP Location: Right arm Right arm Right arm Right arm   Patient Position: Lying Lying Lying Lying   Pulse: 67 63 68 62   Resp: 18 18 18 18   Temp: 97.8 °F (36.6 °C) 98.8 °F (37.1 °C) 97.6 °F (36.4 °C) 97 °F (36.1 °C)   TempSrc: Oral Oral Oral Oral   SpO2: 94% 95% 92% 97%   Weight:       Height:         Drains around 100 each  Alert and oriented x3, no acute distress  On 2L nc  Chest clear bilaterally, regular rate rhythm  Abdomen soft, slightly tender to palpation, nondistended, Avinash drain in place with serosanguineous fluid, incision clean dry and intact  No edemas    Postoperative day 2 status post open incisional hernia repair with component separation and mesh placement, recovering as expected. Still on oxygen. Tole diet    - IS  - Ambulate  - DC IVF   - Cardiac diet  - Home tomorrow likely    Cullen Munoz MD, FACS  General, Minimally Invasive and Endoscopic Surgery  [facility]    [address] [place] [address], [address]  [place], [place], [address]  P: [phone]  F: [phone]

## 2019-03-06 NOTE — PROGRESS NOTES
Discharge Planning Assessment  [facility]     Patient Name: Ghada Ryan  MRN: [id number]  Today's Date: 3/6/2019    Admit Date: 3/4/2019    Discharge Needs Assessment     Row Name 03/06/19 1634       Living Environment    Lives With  other (see comments)    Name(s) of Who Lives With Patient  [facility] in a convent    Current Living Arrangements  home/apartment/condo    Primary Care Provided by  self    Provides Primary Care For  no one    Family Caregiver if Needed  friend(s)    Family Caregiver Names  Mother En/friend [phone]    Quality of Family Relationships  helpful;involved;supportive    Able to Return to Prior Arrangements  yes    Living Arrangement Comments  Lives in a convent.       Resource/Environmental Concerns    Resource/Environmental Concerns  none       Transition Planning    Patient/Family Anticipates Transition to  home with family    Patient/Family Anticipated Services at Transition  none    Transportation Anticipated  family or friend will provide       Discharge Needs Assessment    Readmission Within the Last 30 Days  no previous admission in last 30 days    Concerns to be Addressed  no discharge needs identified;denies needs/concerns at this time    Equipment Currently Used at Home  bipap/cpap [employer]    Anticipated Changes Related to Illness  none    Equipment Needed After Discharge  none    Offered/Gave Vendor List  no    Patient's Choice of Community Agency(s)  Has used [facility] in the past. Denies using SNF.    Discharge Coordination/Progress  Spoke with patient at bedside. Confirmed information on facesheet. Cindy 3/4/19.        Discharge Plan    No documentation.       Destination      No service coordination in this encounter.      Durable Medical Equipment      No service coordination in this encounter.      Dialysis/Infusion      No service coordination in this encounter.      Home Medical Care      No service coordination in this encounter.      Community Resources       No service coordination in this encounter.          Demographic Summary     Row Name 03/06/19 1638       General Information    Admission Type  observation    Referral Source  admission list    Reason for Consult  discharge planning        Functional Status     Row Name 03/06/19 1634       Functional Status    Usual Activity Tolerance  good    Current Activity Tolerance  moderate       Functional Status, IADL    Medications  independent    Meal Preparation  independent    Housekeeping  independent    Laundry  independent    Shopping  independent        Psychosocial    No documentation.       Abuse/Neglect    No documentation.       Legal    No documentation.       Substance Abuse    No documentation.       Patient Forms    No documentation.           Alyce Duran RN

## 2019-03-06 NOTE — PLAN OF CARE
Problem: Patient Care Overview  Goal: Plan of Care Review  Outcome: Ongoing (interventions implemented as appropriate)   03/06/19 8486   Coping/Psychosocial   Plan of Care Reviewed With patient   Plan of Care Review   Progress improving   OTHER   Outcome Summary VSS. Fluids d/c per MD order. Ambulating in [place]. C/o pain a couple times during shift. Decaf coffee with her mirilax. No bm yet. Will continue to monitor.      Goal: Individualization and Mutuality  Outcome: Ongoing (interventions implemented as appropriate)    Goal: Discharge Needs Assessment  Outcome: Ongoing (interventions implemented as appropriate)    Goal: Interprofessional Rounds/Family Conf  Outcome: Ongoing (interventions implemented as appropriate)      Problem: Surgery Nonspecified (Adult)  Goal: Signs and Symptoms of Listed Potential Problems Will be Absent, Minimized or Managed (Surgery Nonspecified)  Outcome: Outcome(s) achieved Date Met: 03/06/19    Goal: Anesthesia/Sedation Recovery  Outcome: Ongoing (interventions implemented as appropriate)      Problem: Pain, Acute (Adult)  Goal: Identify Related Risk Factors and Signs and Symptoms  Outcome: Outcome(s) achieved Date Met: 03/06/19    Goal: Acceptable Pain Control/Comfort Level  Outcome: Ongoing (interventions implemented as appropriate)      Problem: Fall Risk (Adult)  Goal: Identify Related Risk Factors and Signs and Symptoms  Outcome: Outcome(s) achieved Date Met: 03/06/19    Goal: Absence of Fall  Outcome: Ongoing (interventions implemented as appropriate)

## 2019-03-07 VITALS
BODY MASS INDEX: 33.98 KG/M2 | RESPIRATION RATE: 18 BRPM | HEART RATE: 62 BPM | DIASTOLIC BLOOD PRESSURE: 71 MMHG | SYSTOLIC BLOOD PRESSURE: 113 MMHG | HEIGHT: 63 IN | WEIGHT: 191.8 LBS | OXYGEN SATURATION: 93 % | TEMPERATURE: 97.4 F

## 2019-03-07 PROBLEM — K43.2 INCISIONAL HERNIA: Status: RESOLVED | Noted: 2019-03-04 | Resolved: 2019-03-07

## 2019-03-07 PROBLEM — K43.2 INCISIONAL HERNIA, WITHOUT OBSTRUCTION OR GANGRENE: Status: RESOLVED | Noted: 2019-01-30 | Resolved: 2019-03-07

## 2019-03-07 PROCEDURE — 99024 POSTOP FOLLOW-UP VISIT: CPT | Performed by: SURGERY

## 2019-03-07 PROCEDURE — 25010000002 ENOXAPARIN PER 10 MG: Performed by: SURGERY

## 2019-03-07 PROCEDURE — G0378 HOSPITAL OBSERVATION PER HR: HCPCS

## 2019-03-07 RX ORDER — AMOXICILLIN 250 MG
2 CAPSULE ORAL DAILY PRN
Qty: 30 TABLET | Refills: 1 | Status: SHIPPED | OUTPATIENT
Start: 2019-03-07 | End: 2019-03-11

## 2019-03-07 RX ORDER — HYDROCODONE BITARTRATE AND ACETAMINOPHEN 5; 325 MG/1; MG/1
1 TABLET ORAL EVERY 6 HOURS PRN
Qty: 30 TABLET | Refills: 0 | Status: SHIPPED | OUTPATIENT
Start: 2019-03-07 | End: 2019-03-11

## 2019-03-07 RX ORDER — PROMETHAZINE HYDROCHLORIDE 12.5 MG/1
12.5 TABLET ORAL EVERY 6 HOURS PRN
Qty: 10 TABLET | Refills: 0 | Status: SHIPPED | OUTPATIENT
Start: 2019-03-07 | End: 2019-03-11

## 2019-03-07 RX ADMIN — HYDROCODONE BITARTRATE AND ACETAMINOPHEN 1 TABLET: 5; 325 TABLET ORAL at 05:02

## 2019-03-07 RX ADMIN — HYDROCODONE BITARTRATE AND ACETAMINOPHEN 1 TABLET: 5; 325 TABLET ORAL at 09:06

## 2019-03-07 RX ADMIN — ENOXAPARIN SODIUM 30 MG: 30 INJECTION SUBCUTANEOUS at 12:39

## 2019-03-07 RX ADMIN — PANTOPRAZOLE SODIUM 40 MG: 40 TABLET, DELAYED RELEASE ORAL at 06:42

## 2019-03-07 RX ADMIN — POLYETHYLENE GLYCOL 3350 17 G: 17 POWDER, FOR SOLUTION ORAL at 08:56

## 2019-03-07 RX ADMIN — HYDROCODONE BITARTRATE AND ACETAMINOPHEN 1 TABLET: 5; 325 TABLET ORAL at 13:10

## 2019-03-07 RX ADMIN — LOSARTAN POTASSIUM 100 MG: 100 TABLET, FILM COATED ORAL at 06:42

## 2019-03-07 RX ADMIN — TIMOLOL MALEATE 1 DROP: 5 SOLUTION OPHTHALMIC at 09:02

## 2019-03-07 RX ADMIN — GABAPENTIN 200 MG: 100 CAPSULE ORAL at 08:55

## 2019-03-07 RX ADMIN — LEVOTHYROXINE SODIUM 25 MCG: 25 TABLET ORAL at 06:42

## 2019-03-07 NOTE — DISCHARGE SUMMARY
Admission date: 3/4/2019   Discharge date: 3/7/2019  4:40 PM     Admission diagnosis: Incisional hernia, without obstruction or gangrene [K43.2]  Incisional hernia [K43.2]  Incisional hernia, without obstruction or gangrene [K43.2]     Discharge diagnosis: Same     Procedure: Open incisional hernia repair with retrorectus mesh placement on bilateral component separation    Hospital course: Patient was admitted to the hospital after undergoing open incisional hernia repair with bilateral component separation and mesh placement.  Her postoperative course was uneventful.  Diet was advanced slowly and Reeder catheter was removed on postoperative day 1.  She started ambulating her pain was better controlled.  She was tolerating diet and her pain was well controlled on postoperative day 3.  Decision was to discharge the patient home in stable condition.      Meds:      Your medication list      START taking these medications      Instructions Last Dose Given Next Dose Due   HYDROcodone-acetaminophen 5-325 MG per tablet  Commonly known as:  NORCO      Take 1 tablet by mouth Every 6 (Six) Hours As Needed for Moderate Pain  for up to 7 days.       promethazine 12.5 MG tablet  Commonly known as:  PHENERGAN      Take 1 tablet by mouth Every 6 (Six) Hours As Needed for Nausea or Vomiting.       senna-docusate 8.6-50 MG per tablet  Commonly known as:  PERICOLACE      Take 2 tablets by mouth Daily As Needed for Constipation.          CONTINUE taking these medications      Instructions Last Dose Given Next Dose Due   albuterol sulfate  (90 Base) MCG/ACT inhaler  Commonly known as:  PROVENTIL HFA;VENTOLIN HFA;PROAIR HFA      Inhale 2 puffs Every 6 (Six) Hours As Needed.       aspirin 81 MG tablet      Take 81 mg by mouth. HOLD PRIOR TO SURGERY       BREO ELLIPTA 100-25 MCG/INH inhaler  Generic drug:  Fluticasone Furoate-Vilanterol      Inhale 1 puff Daily.       Cetirizine HCl 10 MG capsule      Take 10 mg by mouth Daily.        levothyroxine 25 MCG tablet  Commonly known as:  SYNTHROID, LEVOTHROID      Take 25 mcg by mouth Daily.       losartan 100 MG tablet  Commonly known as:  COZAAR      Take 100 mg by mouth Every Morning.       montelukast 10 MG tablet  Commonly known as:  SINGULAIR      Take 10 mg by mouth Daily.       omeprazole 20 MG capsule  Commonly known as:  priLOSEC      Take 20 mg by mouth Daily.       polyethylene glycol powder  Commonly known as:  MIRALAX      TAKE 17 G BY MOUTH DAILY.       sertraline 50 MG tablet  Commonly known as:  ZOLOFT      Take 50 mg by mouth Daily.       simvastatin 40 MG tablet  Commonly known as:  ZOCOR      Take 40 mg by mouth Every Night.       timolol 0.5 % ophthalmic solution  Commonly known as:  TIMOPTIC      Apply 1 drop to eye 2 (Two) Times a Day.       VITAMIN B COMPLEX PO      Take 1 tablet by mouth Daily. HOLD PRIOR TO SURGERY             Where to Get Your Medications      These medications were sent to [facility] - [place] [address]    Hours:  7:00AM-6PM Mon-Fri Phone:  [phone]   · HYDROcodone-acetaminophen 5-325 MG per tablet  · promethazine 12.5 MG tablet  · senna-docusate 8.6-50 MG per tablet         Instructions:    - No heavy lifting of more than 15 lb for 6 weeks. Can start doing aerobic type exercise in 4 weeks.   - No driving while taking pain medications  - Take medications as prescribed.   - Can shower, no bath tub  -Empty drains daily    Follow-up in my office next Tuesday     Cullen Munoz MD  General, Minimally Invasive and Endoscopic Surgery  [facility]

## 2019-03-07 NOTE — PROGRESS NOTES
Postoperative day 3 status post open incisional hernia repair with component separation and mesh placement    Patient is feeling much better.  She has been able to tolerate diet without any problem.  She has been passing gas, no bowel movement yet    Both drains serosanguineous around 100 cc each  Abdomen is soft, nontender and nondistended, midline incision is well-healed but is a small dehiscence in the mid aspect of the wound without drainage.  This is superficial staples otherwise are in place    Plan:     -I change the dressing and apply antibiotic ointment over the open aspect of the wound.  Discussed with her about the need to change dressings on a daily basis and apply antibiotic ointment  -She should keep the drain and go home with the drains.  She will follow-up in my office next Tuesday for drain removal  -Continue regular diet and bowel regimen with MiraLAX, stool softeners and fiber  -Discharge home today

## 2019-03-07 NOTE — PLAN OF CARE
Problem: Patient Care Overview  Goal: Plan of Care Review  Outcome: Ongoing (interventions implemented as appropriate)   03/07/19 0621   Coping/Psychosocial   Plan of Care Reviewed With patient   Plan of Care Review   Progress improving   OTHER   Outcome Summary VSS. A&Ox4. Ambulating in room with minimal asst. C/O pain but less pain med use today. No BM. Wants miralax mixed with decaf coffee. Possible d/c today.

## 2019-03-08 ENCOUNTER — TELEPHONE (OUTPATIENT)
Dept: FAMILY MEDICINE CLINIC | Facility: CLINIC | Age: 84
End: 2019-03-08

## 2019-03-08 NOTE — TELEPHONE ENCOUNTER
----- Message from Tammy Street MA sent at 3/8/2019 10:26 AM EST -----  Mother En called regarding this pt. She had hernia surgery on 3/4/19 and just got out of the hospital yesterday. Her back has been itching and they think she may have shingles. Please call Mother En back at [phone]

## 2019-03-11 ENCOUNTER — OFFICE VISIT (OUTPATIENT)
Dept: SURGERY | Facility: CLINIC | Age: 84
End: 2019-03-11

## 2019-03-11 DIAGNOSIS — Z51.89 VISIT FOR WOUND CHECK: Primary | ICD-10-CM

## 2019-03-11 PROCEDURE — 99024 POSTOP FOLLOW-UP VISIT: CPT | Performed by: SURGERY

## 2019-03-11 NOTE — PROGRESS NOTES
Cc: Post-op check hernia surgery    S: Ghada Ryan is a 83 y.o. female patient here for follow up of her open incisional hernia repair with component separation and mesh placement on 3/4/19.  The patient is doing well, and has no specific complaints.  Denies problems with incisions, fever, chills, nausea, vomiting, diarrhea or constipation.  Minimal drainage from drains    O:   Wound are healing well without signs of infection.  No current signs of recurrence.    Abdomen: soft, non tender.  Both Avinash drains with serosanguineous fluid    A/P Patient s/p uncomplicated open incisional hernia repair with component separation the patient is doing great and denies any major issues.  Her drains are draining minimal serosanguineous fluid.  The wound is healing appropriately    Both Avinash drains were removed and antibiotic ointment was placed over the wounds and covered with 4 x 4 and Tegaderm.  Discussed with the patient about the need to remove the dressings in 3 days and replace dressing if she has continuous drainage, after 3 days she can shower    Patient should avoid doing any heavy lifting.  Patient to follow-up in my office next week for staple removal    Patient verbalized understanding and agreed with the plan    Cullen Munoz MD  General, Minimally Invasive and Endoscopic Surgery  [facility]    [facility], [address]  [place], [place], [address]  P: [phone]  F: [phone]

## 2019-03-15 RX ORDER — SIMVASTATIN 40 MG
40 TABLET ORAL NIGHTLY
Qty: 90 TABLET | Refills: 1 | Status: SHIPPED | OUTPATIENT
Start: 2019-03-15 | End: 2019-11-21 | Stop reason: SDUPTHER

## 2019-03-19 ENCOUNTER — OFFICE VISIT (OUTPATIENT)
Dept: SURGERY | Facility: CLINIC | Age: 84
End: 2019-03-19

## 2019-03-19 DIAGNOSIS — Z09 POSTOP CHECK: Primary | ICD-10-CM

## 2019-03-19 PROCEDURE — 99024 POSTOP FOLLOW-UP VISIT: CPT | Performed by: SURGERY

## 2019-03-19 NOTE — PROGRESS NOTES
Cc: Post-op check hernia surgery     S: Ghada Ryan is a 83 y.o. female patient here for follow up of her open incisional hernia repair with component separation and mesh placement on 3/4/19.   Her drains were removed on 3/11/2019.  Since then she reports no issues other than mild abdominal discomfort around her wound.  She has been tolerating diet without any problem.  Reports good bowel function.     O: Alert and oriented x3, no acute distress  Abdomen soft, slightly tender to palpation over the right side, nondistended, incision is clean dry and intact and is healing great without any signs of infection and with staples in place.  Soft tissue thickening of the lower aspect of the wound, possible seroma.  There is no hernia recurrence    Assessment and plan    The patient is a very pleasant 83-year-old female status post incisional hernia repair with component separation and retrorectus mesh placement that is doing great.  Staples were removed and Steri-Strips were placed.  The hernia is healing great without signs of recurrence.  There is soft tissue thickening at the lower aspect of the wound that is expected after her surgery.  There is likely a small seroma that is clinically insignificant and does not need any type of intervention    -Discussed with the patient about the need to continue limiting her physical activity and lifting for the next 4-week  -I encouraged her to continue to lose weight  -She can shower, no bathtub  -Patient to follow-up in my office in 4 weeks    Patient verbalized understanding and agree with the plan    Cullen Munoz MD, FACS  General, Minimally Invasive and Endoscopic Surgery  [facility]    [address] [place] [address], [address]  [place], [place], [address]  P: [phone]  F: [phone]

## 2019-03-20 ENCOUNTER — OFFICE VISIT (OUTPATIENT)
Dept: FAMILY MEDICINE CLINIC | Facility: CLINIC | Age: 84
End: 2019-03-20

## 2019-03-20 VITALS
TEMPERATURE: 98.1 F | HEART RATE: 72 BPM | HEIGHT: 63 IN | BODY MASS INDEX: 33.49 KG/M2 | DIASTOLIC BLOOD PRESSURE: 76 MMHG | OXYGEN SATURATION: 97 % | SYSTOLIC BLOOD PRESSURE: 130 MMHG | WEIGHT: 189 LBS | RESPIRATION RATE: 16 BRPM

## 2019-03-20 DIAGNOSIS — E87.1 HYPONATREMIA: ICD-10-CM

## 2019-03-20 DIAGNOSIS — R21 RASH OF BACK: ICD-10-CM

## 2019-03-20 DIAGNOSIS — K43.2 INCISIONAL HERNIA, WITHOUT OBSTRUCTION OR GANGRENE: Primary | ICD-10-CM

## 2019-03-20 DIAGNOSIS — D64.9 ANEMIA, UNSPECIFIED TYPE: ICD-10-CM

## 2019-03-20 PROBLEM — N95.1 MENOPAUSAL STATE: Status: ACTIVE | Noted: 2018-10-02

## 2019-03-20 LAB
ERYTHROCYTE [DISTWIDTH] IN BLOOD BY AUTOMATED COUNT: 15 % (ref 12.3–15.4)
HCT VFR BLD AUTO: 35.9 % (ref 34–46.6)
HGB BLD-MCNC: 11.2 G/DL (ref 12–15.9)
MCH RBC QN AUTO: 28.4 PG (ref 26.6–33)
MCHC RBC AUTO-ENTMCNC: 31.2 G/DL (ref 31.5–35.7)
MCV RBC AUTO: 91.1 FL (ref 79–97)
PLATELET # BLD AUTO: 358 10*3/MM3 (ref 140–450)
RBC # BLD AUTO: 3.94 10*6/MM3 (ref 3.77–5.28)
WBC # BLD AUTO: 4.83 10*3/MM3 (ref 3.4–10.8)

## 2019-03-20 PROCEDURE — 99214 OFFICE O/P EST MOD 30 MIN: CPT | Performed by: FAMILY MEDICINE

## 2019-03-20 NOTE — PATIENT INSTRUCTIONS
"Use calamine lotion for your rash and Zyrtec without \"D\".    I will call you with lab results.    "

## 2019-03-20 NOTE — PROGRESS NOTES
Subjective   Ghada Ryan is a 83 y.o. female.     History of Present Illness   Sister is here for f/u regarding anemia  and rash. Pt says she had an abdominal surgery , a hernia repair, on  3/4/19. She says  before the surgery at the hospital bed she started having some itching in her back. Says after surgery the itching got worse, specially at night when she goes bed. She is doing a little better. She has not taken any antihistamines. She has occasionally used calamine lotion and that helps.    She had mild anemia and hyponatremia in the hospital and needs her labs checked today.  Abdominal sutures removes yesterday and she is feeling better.    The following portions of the patient's history were reviewed and updated as appropriate: allergies, current medications, past family history, past medical history, past social history, past surgical history and problem list.    Review of Systems   Constitutional: Positive for fatigue.   HENT: Negative.    Eyes: Negative.    Gastrointestinal: Positive for abdominal pain. Negative for constipation and diarrhea.   Musculoskeletal: Negative.    Skin: Positive for rash.   Neurological: Negative.    Hematological: Negative.    Psychiatric/Behavioral: Negative.        Objective   Physical Exam   Constitutional: She is oriented to person, place, and time. She appears well-developed and well-nourished. No distress.   Cardiovascular: Normal rate and regular rhythm.   Pulmonary/Chest: Effort normal.   Abdominal: Soft.   Sutures are clean and dry   Musculoskeletal: Normal range of motion.   Neurological: She is alert and oriented to person, place, and time.   Skin: Rash noted.   Mild pink rash on lower back, crossing the midline, no vesicles or papules.   Psychiatric: She has a normal mood and affect. Her behavior is normal.   Nursing note and vitals reviewed.        Assessment/Plan   Ghada was seen today for anemia and rash.    Diagnoses and all orders for this  visit:    Incisional hernia, without obstruction or gangrene  She is healing well and followed by general surgery.    Anemia, unspecified type  Will check labs today.  -     CBC (No Diff)    Hyponatremia  Mild and noticed post operatively/ Will check today  -     Comprehensive Metabolic Panel    Rash of back  This has actually improved a great deal and I have advised her to take an antihistamine twice a day as well as use calamine lotion.       Current outpatient and discharge medications have been reconciled for the patient.  Reviewed by: Miguelito Connolly MD

## 2019-03-21 LAB
ALBUMIN SERPL-MCNC: 4.1 G/DL (ref 3.5–5.2)
ALBUMIN/GLOB SERPL: 1.6 G/DL
ALP SERPL-CCNC: 91 U/L (ref 39–117)
ALT SERPL-CCNC: 24 U/L (ref 1–33)
AST SERPL-CCNC: 24 U/L (ref 1–32)
BILIRUB SERPL-MCNC: 0.3 MG/DL (ref 0.2–1.2)
BUN SERPL-MCNC: 13 MG/DL (ref 8–23)
BUN/CREAT SERPL: 18.3 (ref 7–25)
CALCIUM SERPL-MCNC: 9.6 MG/DL (ref 8.6–10.5)
CHLORIDE SERPL-SCNC: 97 MMOL/L (ref 98–107)
CO2 SERPL-SCNC: 26.4 MMOL/L (ref 22–29)
CREAT SERPL-MCNC: 0.71 MG/DL (ref 0.57–1)
GLOBULIN SER CALC-MCNC: 2.6 GM/DL
GLUCOSE SERPL-MCNC: 95 MG/DL (ref 65–99)
POTASSIUM SERPL-SCNC: 4.4 MMOL/L (ref 3.5–5.2)
PROT SERPL-MCNC: 6.7 G/DL (ref 6–8.5)
SODIUM SERPL-SCNC: 137 MMOL/L (ref 136–145)

## 2019-03-30 DIAGNOSIS — F41.9 ANXIETY: ICD-10-CM

## 2019-04-16 ENCOUNTER — OFFICE VISIT (OUTPATIENT)
Dept: SURGERY | Facility: CLINIC | Age: 84
End: 2019-04-16

## 2019-04-16 DIAGNOSIS — Z51.89 VISIT FOR WOUND CHECK: Primary | ICD-10-CM

## 2019-04-16 PROCEDURE — 99024 POSTOP FOLLOW-UP VISIT: CPT | Performed by: SURGERY

## 2019-04-18 NOTE — PROGRESS NOTES
Cc: Post-op check hernia surgery    S: Ghada Ryan is a 83 y.o. female patient here for follow up of her open incisional hernia repair with component separation and mesh placement on 3/4/19.   Her drains were removed on 3/11/2019.  She reports intermittent episodes of pain over the left side of her incision, otherwise she has been tolerating diet without any problem.  She has been having regular bowel movements.  She has been able to lose some weight      O: Alert and oriented x3, no acute distress  Abdomen soft, nontender and nondistended, midline incision is well-healed, there is no evidence of hernia recurrence.    Assessment and plan    The patient is a very pleasant 83-year-old female status post incisional hernia repair with component separation and retrorectus mesh placement that is doing great.  Midline incision is well-healed.  There is no evidence of hernia recurrence.  Minimal pain.  Discussed with the patient about further step.     -No heavy lifting for another 4 weeks  -Continue to lose weight  -Follow-up in my office in 6 months    Cullen Munoz MD, FACS  General, Minimally Invasive and Endoscopic Surgery  [facility]    [address] [place] [address], [address]  [place], [place], [address]  P: [phone]  F: [phone]

## 2019-05-15 RX ORDER — LOSARTAN POTASSIUM 100 MG/1
TABLET ORAL
Qty: 90 TABLET | Refills: 3 | Status: SHIPPED | OUTPATIENT
Start: 2019-05-15 | End: 2020-05-20

## 2019-05-28 ENCOUNTER — OFFICE VISIT (OUTPATIENT)
Dept: FAMILY MEDICINE CLINIC | Facility: CLINIC | Age: 84
End: 2019-05-28

## 2019-05-28 VITALS
OXYGEN SATURATION: 98 % | SYSTOLIC BLOOD PRESSURE: 140 MMHG | HEIGHT: 63 IN | BODY MASS INDEX: 34.38 KG/M2 | DIASTOLIC BLOOD PRESSURE: 80 MMHG | RESPIRATION RATE: 16 BRPM | HEART RATE: 62 BPM | WEIGHT: 194 LBS

## 2019-05-28 DIAGNOSIS — M54.6 CHRONIC THORACIC BACK PAIN, UNSPECIFIED BACK PAIN LATERALITY: Primary | ICD-10-CM

## 2019-05-28 DIAGNOSIS — R26.89 BALANCE PROBLEM: ICD-10-CM

## 2019-05-28 DIAGNOSIS — M19.041 PRIMARY OSTEOARTHRITIS OF BOTH HANDS: ICD-10-CM

## 2019-05-28 DIAGNOSIS — M19.042 PRIMARY OSTEOARTHRITIS OF BOTH HANDS: ICD-10-CM

## 2019-05-28 DIAGNOSIS — G89.29 CHRONIC THORACIC BACK PAIN, UNSPECIFIED BACK PAIN LATERALITY: Primary | ICD-10-CM

## 2019-05-28 DIAGNOSIS — I10 ESSENTIAL HYPERTENSION: ICD-10-CM

## 2019-05-28 PROCEDURE — 99214 OFFICE O/P EST MOD 30 MIN: CPT | Performed by: FAMILY MEDICINE

## 2019-05-28 RX ORDER — ALENDRONATE SODIUM 70 MG/1
70 TABLET ORAL
Refills: 2 | COMMUNITY
Start: 2019-04-29 | End: 2020-02-20

## 2019-05-28 NOTE — PROGRESS NOTES
Subjective   Ghada Ryan is a 83 y.o. female.     History of Present Illness   Sr is here to discuss bone and joint aches and possible referral to PT.  She recently saw Dr Bernstein, endocrine, who started her on Fosamax.  She states she has noticed that she is walking a little bent over and would like to se if PT could help w/ this.She has a hx of chronic low back pain.She works near [employer] and would like to go there for PT.   She also has chronic arthritis in both hands and is wondering how to help with the pain. She is functional, hands just hurt during the day.    Sr. has a history of chronic hypertension and has been well controlled on current medications.She is tolerating medications without side effect. She reports no vision changes, headaches or lightheadedness. She is requesting refills of medications.  The following portions of the patient's history were reviewed and updated as appropriate: allergies, current medications, past family history, past medical history, past social history, past surgical history and problem list.    Review of Systems   Constitutional: Positive for unexpected weight gain.   HENT: Positive for congestion.    Eyes: Negative.    Respiratory: Positive for cough.    Cardiovascular: Negative.    Genitourinary: Positive for frequency.   Musculoskeletal: Positive for arthralgias.   Skin: Negative.    Neurological: Negative.    All other systems reviewed and are negative.      Objective   Physical Exam   Constitutional: She is oriented to person, place, and time. She appears well-developed.   HENT:   Head: Normocephalic and atraumatic.   Cardiovascular: Normal rate.   Pulmonary/Chest: Effort normal.   Musculoskeletal:   Kyphosis, bilateral hands have swollen DIPs and PIPs. No redness or warmth.   Neurological: She is alert and oriented to person, place, and time.   Skin: Skin is warm.   Psychiatric: She has a normal mood and affect. Her behavior is normal. Judgment and  thought content normal.   Nursing note and vitals reviewed.        Assessment/Plan zac Urrutia was seen today for osteoarthritis.    Diagnoses and all orders for this visit:    Balance problem  Chronic thoracic back pain, unspecified back pain laterality  -     Ambulatory Referral to Physical Therapy    Primary osteoarthritis of both hands  Advised warm water in the morning and Aspirin cream to help with pain.    Essential hypertension  Well controlled on current medication, will refill medication today and as needed. She will RTO for repeat B/P check in 6 months.

## 2019-05-29 PROBLEM — I10 HYPERTENSION: Status: ACTIVE | Noted: 2019-05-29

## 2019-05-29 PROBLEM — M81.0 AGE-RELATED OSTEOPOROSIS WITHOUT CURRENT PATHOLOGICAL FRACTURE: Status: ACTIVE | Noted: 2019-04-29

## 2019-06-07 RX ORDER — OMEPRAZOLE 20 MG/1
CAPSULE, DELAYED RELEASE ORAL
Qty: 90 CAPSULE | Refills: 1 | Status: SHIPPED | OUTPATIENT
Start: 2019-06-07 | End: 2019-07-08 | Stop reason: SDUPTHER

## 2019-06-20 ENCOUNTER — OFFICE VISIT (OUTPATIENT)
Dept: ORTHOPEDIC SURGERY | Facility: CLINIC | Age: 84
End: 2019-06-20

## 2019-06-20 VITALS — BODY MASS INDEX: 34.38 KG/M2 | TEMPERATURE: 97.9 F | HEIGHT: 63 IN | WEIGHT: 194 LBS

## 2019-06-20 DIAGNOSIS — M17.12 PRIMARY OSTEOARTHRITIS OF LEFT KNEE: Primary | ICD-10-CM

## 2019-06-20 PROCEDURE — 99213 OFFICE O/P EST LOW 20 MIN: CPT | Performed by: ORTHOPAEDIC SURGERY

## 2019-06-20 NOTE — PROGRESS NOTES
"Ghada Ryan : 1935 MRN: [id number] DATE: 2019    DIAGNOSIS: Annual follow up right total knee      SUBJECTIVE:Patient returns today for  1 year follow up of right total knee replacement. Patient reports doing well with no unusual complaints. Denies any limitations due to the knee. Has diffuse anterior lefft knee soreness x 6 months.    OBJECTIVE:    Temp 97.9 °F (36.6 °C)   Ht 160 cm (62.99\")   Wt 88 kg (194 lb)   BMI 34.37 kg/m²   Family History   Problem Relation Age of Onset   • Tuberculosis Mother    • Kidney cancer Father    • Malig Hyperthermia Neg Hx      Past Medical History:   Diagnosis Date   • Anemia    • Anxiety    • Arthritis    • Asthma    • Baker's cyst of knee, left     BEHIND THE LEFT KNEE   • Colon polyps    • Diverticulitis 2017   • Diverticulosis    • DVT, lower extremity, distal (CMS/HCC)    • Elevated cholesterol    • Environmental allergies    • GERD (gastroesophageal reflux disease)    • Glaucoma    • Hyperlipidemia    • Hypertension    • Hypothyroidism    • Incisional hernia    • Macular degeneration    • Meniscus degeneration    • Parathyroid disease (CMS/HCC)    • Seasonal allergies    • Sleep apnea     C-PAP     Past Surgical History:   Procedure Laterality Date   • ABDOMINAL HERNIA REPAIR Bilateral 2019   • BREAST LUMPECTOMY     • COLON RESECTION N/A 2017    Procedure: Laparoscopic hand assist sigmoid colectomy;  Surgeon: Cullen Munoz MD;  Location: [facility] OR;  Service:    • COLONOSCOPY N/A 2017    Diverticulosis in the sigmoid colon and in the descending colon, A single erosion in the descending colon, IH. PATH: NO INFLAMMATION, FIBROSIS, ULCERATION, OR GRANULOMA.   • COLONOSCOPY W/ POLYPECTOMY N/A 2013    pre cancerous polyps [place]-BENIGN   • THYROIDECTOMY Left 3/15/2018    Procedure: RIGHT AND LEFT SUPERIOR  PARATHYROID ADENOMA RESECTION;  Surgeon: Adamaris Bhandari MD;  Location: [facility];  Service: General "   • TOTAL KNEE ARTHROPLASTY Right 8/8/2018    Procedure: RT TOTAL KNEE ARTHROPLASTY;  Surgeon: Demond Forrest MD;  Location: [facility] OR;  Service: Orthopedics   • VENTRAL HERNIA REPAIR N/A 3/4/2019    Procedure: Incisional hernia repair with bilateral component separation and mesh placement;  Surgeon: Cullen Munoz MD;  Location: [facility] MAIN OR;  Service: General   • WISDOM TOOTH EXTRACTION Bilateral      Social History     Socioeconomic History   • Marital status: Single     Spouse name: Not on file   • Number of children: Not on file   • Years of education: college   • Highest education level: Not on file   Occupational History   • Occupation: Nun     Employer: [employer]   Tobacco Use   • Smoking status: Never Smoker   • Smokeless tobacco: Never Used   Substance and Sexual Activity   • Alcohol use: Yes     Comment: 1-2 DRINKS EVERY SIX MONTHS   • Drug use: No   • Sexual activity: No   Social History Narrative    Lives in community     Review of Systems: 14 point review of systems performed, all systems negative     Exam:. The incision is well healed. Range of motion is measured at 0 to 120. The calf is soft and nontender with a negative Homans sign. Alignment is neutral. Good quad strength. There is no evidence of varus/valgus or flexion instability. No effusion. Intact to light touch with palpable distal pulses.     DIAGNOSTIC STUDIES  Xrays: 3 views(AP bilateral knees, lateral right, and sunrise bilateral knees) were ordered and reviewed for evaluation of right knee replacement. They demonstrate a well positioned, well aligned knee replacement without complicating factors noted. In comparison with previous films there has been no change.    ASSESSMENT: Annual follow up right knee replacement. Left knee oa    PLAN:  Continue activities as tolerated    Follow up in 1 year    If L knee hurts she can call for injection in September before her trip     Demond Forrest MD  6/20/2019

## 2019-06-27 DIAGNOSIS — F41.9 ANXIETY: ICD-10-CM

## 2019-07-08 RX ORDER — OMEPRAZOLE 20 MG/1
20 CAPSULE, DELAYED RELEASE ORAL DAILY
Qty: 90 CAPSULE | Refills: 3 | Status: SHIPPED | OUTPATIENT
Start: 2019-07-08 | End: 2020-09-28

## 2019-07-24 DIAGNOSIS — M54.6 CHRONIC THORACIC BACK PAIN, UNSPECIFIED BACK PAIN LATERALITY: Primary | ICD-10-CM

## 2019-07-24 DIAGNOSIS — G89.29 CHRONIC THORACIC BACK PAIN, UNSPECIFIED BACK PAIN LATERALITY: Primary | ICD-10-CM

## 2019-08-15 ENCOUNTER — OFFICE VISIT (OUTPATIENT)
Dept: FAMILY MEDICINE CLINIC | Facility: CLINIC | Age: 84
End: 2019-08-15

## 2019-08-15 VITALS
HEIGHT: 63 IN | SYSTOLIC BLOOD PRESSURE: 128 MMHG | BODY MASS INDEX: 33.84 KG/M2 | RESPIRATION RATE: 16 BRPM | HEART RATE: 64 BPM | DIASTOLIC BLOOD PRESSURE: 74 MMHG | OXYGEN SATURATION: 98 % | WEIGHT: 191 LBS

## 2019-08-15 DIAGNOSIS — R35.0 URINARY FREQUENCY: Primary | ICD-10-CM

## 2019-08-15 DIAGNOSIS — R31.0 GROSS HEMATURIA: ICD-10-CM

## 2019-08-15 LAB
BILIRUB BLD-MCNC: NEGATIVE MG/DL
CLARITY, POC: CLEAR
COLOR UR: YELLOW
GLUCOSE UR STRIP-MCNC: NEGATIVE MG/DL
KETONES UR QL: NEGATIVE
LEUKOCYTE EST, POC: NEGATIVE
NITRITE UR-MCNC: NEGATIVE MG/ML
PH UR: 7 [PH] (ref 5–8)
PROT UR STRIP-MCNC: ABNORMAL MG/DL
RBC # UR STRIP: ABNORMAL /UL
SP GR UR: 1 (ref 1–1.03)
UROBILINOGEN UR QL: NORMAL

## 2019-08-15 PROCEDURE — 81002 URINALYSIS NONAUTO W/O SCOPE: CPT | Performed by: FAMILY MEDICINE

## 2019-08-15 PROCEDURE — 99213 OFFICE O/P EST LOW 20 MIN: CPT | Performed by: FAMILY MEDICINE

## 2019-08-15 RX ORDER — SULFAMETHOXAZOLE AND TRIMETHOPRIM 800; 160 MG/1; MG/1
1 TABLET ORAL 2 TIMES DAILY
Qty: 10 TABLET | Refills: 0 | Status: SHIPPED | OUTPATIENT
Start: 2019-08-15 | End: 2019-08-20

## 2019-08-15 NOTE — PATIENT INSTRUCTIONS
I have started you on an antibiotic - take Bactrim twice a day for 5 days.  I will call you if I need to change your antibiotic.     I will see you back in 2 weeks.

## 2019-08-15 NOTE — PROGRESS NOTES
Subjective   Ghada Ryan is a 83 y.o. female.     History of Present Illness   Pt is here for UTI. She has increased urinary frequency. Denies pain or burning. She has noticed that this is getting worse over the last several days and she is feeling very tired.     The following portions of the patient's history were reviewed and updated as appropriate: allergies, current medications, past family history, past medical history, past social history, past surgical history and problem list.    Review of Systems   Genitourinary: Positive for frequency and urgency.       Objective   Physical Exam   Constitutional: She is oriented to person, place, and time. She appears well-developed and well-nourished.   HENT:   Head: Normocephalic and atraumatic.   Eyes: EOM are normal. Pupils are equal, round, and reactive to light.   Neck: Normal range of motion.   Cardiovascular: Normal rate and regular rhythm.   Pulmonary/Chest: Effort normal.   Neurological: She is alert and oriented to person, place, and time.   Skin: Skin is warm and dry. No rash noted.   Psychiatric: She has a normal mood and affect. Her behavior is normal.   Nursing note and vitals reviewed.        Assessment/Plan   Ghada was seen today for urinary tract infection.    Diagnoses and all orders for this visit:    Urinary frequency  -     Urine Culture - Urine, Urine, Clean Catch  -     POC Urinalysis Dipstick    Gross hematuria  -     sulfamethoxazole-trimethoprim (BACTRIM DS) 800-160 MG per tablet; Take 1 tablet by mouth 2 (Two) Times a Day for 5 days.      I have started her on Bactrim and will send urine for culture.

## 2019-08-17 LAB
BACTERIA UR CULT: NO GROWTH
BACTERIA UR CULT: NORMAL

## 2019-08-19 DIAGNOSIS — R35.0 URINARY FREQUENCY: Primary | ICD-10-CM

## 2019-09-05 ENCOUNTER — OFFICE VISIT (OUTPATIENT)
Dept: FAMILY MEDICINE CLINIC | Facility: CLINIC | Age: 84
End: 2019-09-05

## 2019-09-05 VITALS
OXYGEN SATURATION: 98 % | HEART RATE: 68 BPM | SYSTOLIC BLOOD PRESSURE: 114 MMHG | WEIGHT: 193 LBS | HEIGHT: 63 IN | BODY MASS INDEX: 34.2 KG/M2 | RESPIRATION RATE: 16 BRPM | DIASTOLIC BLOOD PRESSURE: 70 MMHG

## 2019-09-05 DIAGNOSIS — R35.0 URINARY FREQUENCY: ICD-10-CM

## 2019-09-05 DIAGNOSIS — F41.9 ANXIETY: Primary | ICD-10-CM

## 2019-09-05 DIAGNOSIS — R31.0 HEMATURIA, GROSS: ICD-10-CM

## 2019-09-05 PROCEDURE — 99213 OFFICE O/P EST LOW 20 MIN: CPT | Performed by: FAMILY MEDICINE

## 2019-09-05 RX ORDER — LORAZEPAM 0.5 MG/1
0.5 TABLET ORAL EVERY 8 HOURS PRN
Qty: 5 TABLET | Refills: 0 | Status: SHIPPED | OUTPATIENT
Start: 2019-09-05 | End: 2019-12-16

## 2019-09-05 NOTE — PROGRESS NOTES
Subjective   Ghada Ryan is a 83 y.o. female.     History of Present Illness   Sister is here w/ c/o urinary frequency. This is now a chronic problem for her. She has had microscopic hematuria and is planning to see the urologist next week. [place] and would like some help with this so she does not disrupt the group she is with. She is getting up 2 times a night to urinate. She has no discomfort.  We are going to check an in office urine today.    She has chronic anxiety and has been managing well with Zoloft but is concerned that she will get very nervous on the long flight to [place]. She is asking for a small amount of Ativan for the flight.       The following portions of the patient's history were reviewed and updated as appropriate: allergies, current medications, past family history, past medical history, past social history, past surgical history and problem list.    Review of Systems   Constitutional: Negative.    HENT: Negative.    Eyes: Negative.    Respiratory: Negative.    Cardiovascular: Negative.    Gastrointestinal: Negative.    Genitourinary: Positive for frequency and hematuria.   Musculoskeletal: Positive for arthralgias and back pain.   Skin: Negative.    Neurological: Negative.    Psychiatric/Behavioral: The patient is nervous/anxious.        Objective   Physical Exam   Constitutional: She is oriented to person, place, and time. She appears well-developed and well-nourished.   HENT:   Head: Normocephalic and atraumatic.   Eyes: EOM are normal. Pupils are equal, round, and reactive to light.   Neck: Normal range of motion.   Cardiovascular: Normal rate and regular rhythm.   Pulmonary/Chest: Effort normal.   Neurological: She is alert and oriented to person, place, and time.   Skin: Skin is warm and dry. No rash noted.   Psychiatric: She has a normal mood and affect. Her behavior is normal. Judgment and thought content normal.   Nursing note and vitals  reviewed.        Assessment/Plan   Ghada was seen today for urinary frequency.    Diagnoses and all orders for this visit:    Anxiety  She will continue her regular dose of Zoloft on this trip.   I have advised Sister to take only a 1/2 a tab when she is n the long flight overseas, to alert who she is traveling with that she has taken this. She will not drink alcohol while taking this medication. I have given her 5 tablets.   -     LORazepam (ATIVAN) 0.5 MG tablet; Take 1 tablet by mouth Every 8 (Eight) Hours As Needed for Anxiety.    Urinary frequency  I have started Sister on this medication to see if this will hel decrease her urgency. I have advisd her regarding side effects and encouraged her to drink plenty of liquids.   -     Mirabegron ER (MYRBETRIQ) 25 MG tablet sustained-release 24 hour 24 hr tablet; Take 1 tablet by mouth Daily.    Hematuria, gross  Urine dipstick today showed no signs of blood. She will follow up with urology to be sure we are not missing anything.

## 2019-09-12 ENCOUNTER — OFFICE VISIT (OUTPATIENT)
Dept: ORTHOPEDIC SURGERY | Facility: CLINIC | Age: 84
End: 2019-09-12

## 2019-09-12 VITALS — WEIGHT: 193 LBS | HEIGHT: 63 IN | BODY MASS INDEX: 34.2 KG/M2 | TEMPERATURE: 98.2 F

## 2019-09-12 DIAGNOSIS — M17.12 PRIMARY OSTEOARTHRITIS OF LEFT KNEE: Primary | ICD-10-CM

## 2019-09-12 PROCEDURE — 73562 X-RAY EXAM OF KNEE 3: CPT | Performed by: ORTHOPAEDIC SURGERY

## 2019-09-13 PROCEDURE — 20610 DRAIN/INJ JOINT/BURSA W/O US: CPT | Performed by: ORTHOPAEDIC SURGERY

## 2019-09-13 RX ORDER — METHYLPREDNISOLONE ACETATE 80 MG/ML
80 INJECTION, SUSPENSION INTRA-ARTICULAR; INTRALESIONAL; INTRAMUSCULAR; SOFT TISSUE
Status: COMPLETED | OUTPATIENT
Start: 2019-09-13 | End: 2019-09-13

## 2019-09-13 RX ADMIN — METHYLPREDNISOLONE ACETATE 80 MG: 80 INJECTION, SUSPENSION INTRA-ARTICULAR; INTRALESIONAL; INTRAMUSCULAR; SOFT TISSUE at 22:19

## 2019-09-14 NOTE — PROGRESS NOTES
9/13/2019    Ghada Ryan is here today for worsening knee pain. Pt has undergone injection of the knee in the past with good resolution of symptoms. Pt is requesting a repeat injection.     KNEE Injection Procedure Note:    Large Joint Arthrocentesis: L knee  Date/Time: 9/13/2019 10:19 PM  Consent given by: patient  Site marked: site marked  Timeout: Immediately prior to procedure a time out was called to verify the correct patient, procedure, equipment, support staff and site/side marked as required   Supporting Documentation  Indications: pain and joint swelling   Procedure Details  Location: knee - L knee  Preparation: Patient was prepped and draped in the usual sterile fashion  Needle size: 22 G  Approach: anterolateral  Medications administered: 80 mg methylPREDNISolone acetate 80 MG/ML; 2 mL lidocaine (cardiac)  Patient tolerance: patient tolerated the procedure well with no immediate complications        Xrays: 3 views left knee (AP, lateral, and sunrise) were ordered and reviewed for evaluation of knee pain demonstrating - moderatley advanced OA with near bone on bone articulation, subchondral cysts, and periarticular osteophytes. In comparison to previous films there has been no change.    At the conclusion of the injection I discussed the importance of continued quad strengthening exercises on a daily basis. I will see the patient back if the symptoms should fail to improve or worsen.    Demond Forrest MD  9/13/2019

## 2019-09-24 DIAGNOSIS — F41.9 ANXIETY: ICD-10-CM

## 2019-10-10 DIAGNOSIS — Z96.651 STATUS POST TOTAL RIGHT KNEE REPLACEMENT: Primary | ICD-10-CM

## 2019-11-21 RX ORDER — SIMVASTATIN 40 MG
TABLET ORAL
Qty: 90 TABLET | Refills: 1 | Status: SHIPPED | OUTPATIENT
Start: 2019-11-21 | End: 2020-05-20

## 2019-12-13 ENCOUNTER — OFFICE VISIT (OUTPATIENT)
Dept: FAMILY MEDICINE CLINIC | Facility: CLINIC | Age: 84
End: 2019-12-13

## 2019-12-13 VITALS
WEIGHT: 196 LBS | HEART RATE: 69 BPM | OXYGEN SATURATION: 97 % | HEIGHT: 63 IN | DIASTOLIC BLOOD PRESSURE: 80 MMHG | SYSTOLIC BLOOD PRESSURE: 148 MMHG | BODY MASS INDEX: 34.73 KG/M2

## 2019-12-13 DIAGNOSIS — B37.49 CANDIDIASIS OF PERINEUM: ICD-10-CM

## 2019-12-13 DIAGNOSIS — R30.0 DYSURIA: Primary | ICD-10-CM

## 2019-12-13 LAB
BILIRUB BLD-MCNC: NEGATIVE MG/DL
CLARITY, POC: CLEAR
COLOR UR: NORMAL
GLUCOSE UR STRIP-MCNC: NEGATIVE MG/DL
KETONES UR QL: NEGATIVE
LEUKOCYTE EST, POC: NEGATIVE
NITRITE UR-MCNC: NEGATIVE MG/ML
PH UR: 8 [PH] (ref 5–8)
PROT UR STRIP-MCNC: NEGATIVE MG/DL
RBC # UR STRIP: NEGATIVE /UL
SP GR UR: 1.01 (ref 1–1.03)
UROBILINOGEN UR QL: NORMAL

## 2019-12-13 PROCEDURE — 99213 OFFICE O/P EST LOW 20 MIN: CPT | Performed by: FAMILY MEDICINE

## 2019-12-13 PROCEDURE — 81002 URINALYSIS NONAUTO W/O SCOPE: CPT | Performed by: FAMILY MEDICINE

## 2019-12-13 RX ORDER — NYSTATIN AND TRIAMCINOLONE ACETONIDE 100000; 1 [USP'U]/G; MG/G
OINTMENT TOPICAL 2 TIMES DAILY
Qty: 30 G | Refills: 0 | Status: SHIPPED | OUTPATIENT
Start: 2019-12-13 | End: 2020-02-20

## 2019-12-13 NOTE — PROGRESS NOTES
Subjective   Ghada Ryan is a 84 y.o. female.     History of Present Illness   Sister Ghada Astorga is here for a possible UTI she stated that the symptoms have been going on for a week, she hasn't tried anything over the counter. She  Has some irritation but she is not urinating more frequently but is irritated,  The following portions of the patient's history were reviewed and updated as appropriate: allergies, current medications, past family history, past medical history, past social history, past surgical history and problem list.    Review of Systems   Genitourinary: Positive for urgency.       Objective   Physical Exam   Constitutional: She is oriented to person, place, and time. She appears well-developed and well-nourished.   HENT:   Head: Normocephalic and atraumatic.   Eyes: Pupils are equal, round, and reactive to light. EOM are normal.   Neck: Normal range of motion.   Cardiovascular: Normal rate and regular rhythm.   Pulmonary/Chest: Effort normal.   Neurological: She is alert and oriented to person, place, and time.   Skin: Skin is warm and dry. No rash noted.   Psychiatric: She has a normal mood and affect. Her behavior is normal.   Nursing note and vitals reviewed.        Assessment/Plan   Ghada was seen today for urinary tract infection.    Diagnoses and all orders for this visit:    Dysuria  -     POCT urinalysis dipstick, manual  UA is negative    Candidiasis of perineum  She has no signs of a UTI so I suspect this may be irritation from a yeast infection. I have started her on topical therapy.   -     nystatin-triamcinolone (MYCOLOG) 349574-4.1 UNIT/GM-% ointment; Apply  topically to the appropriate area as directed 2 (Two) Times a Day.      Patient Instructions   I have started you on Mycolog cream twice a day to help.

## 2019-12-15 DIAGNOSIS — R35.0 URINARY FREQUENCY: ICD-10-CM

## 2019-12-16 RX ORDER — MIRABEGRON 25 MG/1
TABLET, FILM COATED, EXTENDED RELEASE ORAL
Qty: 30 TABLET | Refills: 2 | Status: SHIPPED | OUTPATIENT
Start: 2019-12-16 | End: 2020-02-20

## 2019-12-26 ENCOUNTER — DOCUMENTATION (OUTPATIENT)
Dept: FAMILY MEDICINE CLINIC | Facility: CLINIC | Age: 84
End: 2019-12-26

## 2019-12-26 RX ORDER — AZITHROMYCIN 250 MG/1
TABLET, FILM COATED ORAL
Qty: 6 TABLET | Refills: 0 | Status: SHIPPED | OUTPATIENT
Start: 2019-12-26 | End: 2019-12-31

## 2020-02-20 ENCOUNTER — OFFICE VISIT (OUTPATIENT)
Dept: FAMILY MEDICINE CLINIC | Facility: CLINIC | Age: 85
End: 2020-02-20

## 2020-02-20 VITALS
SYSTOLIC BLOOD PRESSURE: 124 MMHG | DIASTOLIC BLOOD PRESSURE: 70 MMHG | RESPIRATION RATE: 16 BRPM | WEIGHT: 200 LBS | HEIGHT: 63 IN | HEART RATE: 66 BPM | BODY MASS INDEX: 35.44 KG/M2 | OXYGEN SATURATION: 98 %

## 2020-02-20 DIAGNOSIS — M26.649 TMJ ARTHRITIS: Primary | ICD-10-CM

## 2020-02-20 PROCEDURE — 99213 OFFICE O/P EST LOW 20 MIN: CPT | Performed by: FAMILY MEDICINE

## 2020-02-20 NOTE — PATIENT INSTRUCTIONS
Go to the dentist and make sure that you don't have a tooth problem.    For your TMJ , try warm compresses, aspirin cream.

## 2020-02-20 NOTE — PROGRESS NOTES
Subjective   Ghada Ryan is a 84 y.o. female.     History of Present Illness   Sister is here w/ c/o Rt ear pain and pain in the Rt side of her face.  She states this has bothered her for about a week. She states she has a history of TMJ.  Sister states sx seem worse at night.  Nothing seems to make it better or worse.    Sister is also having pain in her hands from arthritis.  She uses Aspercream at night but states it is not very helpful.    The following portions of the patient's history were reviewed and updated as appropriate: allergies, current medications, past family history, past medical history, past social history, past surgical history and problem list.    Review of Systems   HENT: Positive for ear pain.    Musculoskeletal: Positive for neck pain.        Hand pain       Objective   Physical Exam   Constitutional: She is oriented to person, place, and time. She appears well-developed and well-nourished.   HENT:   Head: Normocephalic and atraumatic.   Eyes: Pupils are equal, round, and reactive to light. EOM are normal.   Neck: Normal range of motion.   Cardiovascular: Normal rate and regular rhythm.   Pulmonary/Chest: Effort normal.   Neurological: She is alert and oriented to person, place, and time.   Skin: Skin is warm and dry. No rash noted.   Psychiatric: She has a normal mood and affect. Her behavior is normal.   Nursing note and vitals reviewed.        Assessment/Plan   Problem List Items Addressed This Visit     None      Visit Diagnoses     TMJ arthritis    -  Primary      I have encouraged her to follow up with her dentist and make sure she does not have a tooth problem. I encouraged warm compresses and a soft diet until this improves           No follow-ups on file.

## 2020-03-30 DIAGNOSIS — F41.9 ANXIETY: ICD-10-CM

## 2020-05-13 RX ORDER — LEVOTHYROXINE SODIUM 0.03 MG/1
25 TABLET ORAL DAILY
Qty: 90 TABLET | Refills: 1 | Status: SHIPPED | OUTPATIENT
Start: 2020-05-13 | End: 2020-09-03 | Stop reason: SDUPTHER

## 2020-05-20 RX ORDER — LOSARTAN POTASSIUM 100 MG/1
TABLET ORAL
Qty: 90 TABLET | Refills: 3 | Status: SHIPPED | OUTPATIENT
Start: 2020-05-20 | End: 2021-03-05

## 2020-05-20 RX ORDER — SIMVASTATIN 40 MG
TABLET ORAL
Qty: 90 TABLET | Refills: 1 | Status: SHIPPED | OUTPATIENT
Start: 2020-05-20 | End: 2020-11-13

## 2020-07-29 ENCOUNTER — TELEPHONE (OUTPATIENT)
Dept: FAMILY MEDICINE CLINIC | Facility: CLINIC | Age: 85
End: 2020-07-29

## 2020-07-29 NOTE — TELEPHONE ENCOUNTER
Called pt, and she states this morning her BP was 170 90 and had a head ache for all night. Used Tylenol this morning. She states her BP yesterday was also elevated, 180 70 and she is worry about it. She is taking her medications every day.   She also mention that a dish with and apple pie hit her head 2 days ago. I had ask her if she is having any blurred vision, or abnormal vision, nausea or vomiting or unusual weakness and her answer is no.  Please, advise.

## 2020-07-29 NOTE — TELEPHONE ENCOUNTER
Patient called in stating her blood pressure has been high she would like to speak with the nurse regarding it.    Best call back # [phone]

## 2020-07-31 ENCOUNTER — OFFICE VISIT (OUTPATIENT)
Dept: FAMILY MEDICINE CLINIC | Facility: CLINIC | Age: 85
End: 2020-07-31

## 2020-07-31 VITALS
DIASTOLIC BLOOD PRESSURE: 86 MMHG | HEIGHT: 63 IN | WEIGHT: 199 LBS | TEMPERATURE: 97.4 F | OXYGEN SATURATION: 98 % | SYSTOLIC BLOOD PRESSURE: 160 MMHG | RESPIRATION RATE: 16 BRPM | BODY MASS INDEX: 35.26 KG/M2 | HEART RATE: 65 BPM

## 2020-07-31 DIAGNOSIS — I10 ESSENTIAL HYPERTENSION: Primary | ICD-10-CM

## 2020-07-31 DIAGNOSIS — R35.0 URINARY FREQUENCY: ICD-10-CM

## 2020-07-31 PROCEDURE — 99213 OFFICE O/P EST LOW 20 MIN: CPT | Performed by: FAMILY MEDICINE

## 2020-07-31 NOTE — PROGRESS NOTES
Subjective   Ghada Ryan is a 84 y.o. female.   Hypertension    History of Present Illness   Sister is here for bp check.  She states she has noticed her bp going up.  She states she had a headache 7/28/2020 and decided to check her bp .  She is taking her medication.  Today she has a slightly elevated systolic but her advanced age I think that this is probably a reasonable blood pressure reading for her.  She also notes that she is getting up multiple times at night to urinate and she has left her urine sample for us to check to make sure she does not have any urinary tract infection.    The following portions of the patient's history were reviewed and updated as appropriate: allergies, current medications, past family history, past medical history, past social history, past surgical history and problem list.    Review of Systems   Eyes: Negative for visual disturbance.   Neurological: Positive for headache. Negative for dizziness.   All other systems reviewed and are negative.      Objective   Physical Exam   Constitutional: She is oriented to person, place, and time. She appears well-developed.   HENT:   Head: Normocephalic and atraumatic.   Eyes: Pupils are equal, round, and reactive to light. EOM are normal.   Neck: Normal range of motion. Neck supple.   Cardiovascular: Normal rate, regular rhythm and normal heart sounds.   Pulmonary/Chest: Effort normal and breath sounds normal.   Musculoskeletal: Normal range of motion. She exhibits no edema.   Neurological: She is alert and oriented to person, place, and time.   Skin: Skin is warm and dry. No rash noted.   Nursing note and vitals reviewed.        Assessment/Plan   Problem List Items Addressed This Visit        Cardiovascular and Mediastinum    Hypertension - Primary  No change in medication right now.  I have asked her to come back and let me check her blood pressure again in 1 month.  I have also explained to her that headache can sometimes  raise her blood pressure.      Other Visit Diagnoses     Urinary frequency      Urinary dip was negative.               Return in about 4 weeks (around 8/28/2020).

## 2020-08-31 ENCOUNTER — OFFICE VISIT (OUTPATIENT)
Dept: ORTHOPEDIC SURGERY | Facility: CLINIC | Age: 85
End: 2020-08-31

## 2020-08-31 VITALS — WEIGHT: 195 LBS | HEIGHT: 63 IN | TEMPERATURE: 98 F | BODY MASS INDEX: 34.55 KG/M2

## 2020-08-31 DIAGNOSIS — M17.12 PRIMARY OSTEOARTHRITIS OF LEFT KNEE: ICD-10-CM

## 2020-08-31 DIAGNOSIS — Z96.651 HISTORY OF RIGHT KNEE JOINT REPLACEMENT: ICD-10-CM

## 2020-08-31 DIAGNOSIS — M25.562 LEFT KNEE PAIN, UNSPECIFIED CHRONICITY: Primary | ICD-10-CM

## 2020-08-31 PROCEDURE — 20610 DRAIN/INJ JOINT/BURSA W/O US: CPT | Performed by: NURSE PRACTITIONER

## 2020-08-31 PROCEDURE — 99213 OFFICE O/P EST LOW 20 MIN: CPT | Performed by: NURSE PRACTITIONER

## 2020-08-31 PROCEDURE — 73562 X-RAY EXAM OF KNEE 3: CPT | Performed by: NURSE PRACTITIONER

## 2020-08-31 RX ADMIN — METHYLPREDNISOLONE ACETATE 80 MG: 80 INJECTION, SUSPENSION INTRA-ARTICULAR; INTRALESIONAL; INTRAMUSCULAR; SOFT TISSUE at 15:43

## 2020-09-02 RX ORDER — METHYLPREDNISOLONE ACETATE 80 MG/ML
80 INJECTION, SUSPENSION INTRA-ARTICULAR; INTRALESIONAL; INTRAMUSCULAR; SOFT TISSUE
Status: COMPLETED | OUTPATIENT
Start: 2020-08-31 | End: 2020-08-31

## 2020-09-03 ENCOUNTER — TELEPHONE (OUTPATIENT)
Dept: FAMILY MEDICINE CLINIC | Facility: CLINIC | Age: 85
End: 2020-09-03

## 2020-09-03 DIAGNOSIS — E03.9 HYPOTHYROIDISM, UNSPECIFIED TYPE: Primary | ICD-10-CM

## 2020-09-03 RX ORDER — LEVOTHYROXINE SODIUM 0.03 MG/1
25 TABLET ORAL DAILY
Qty: 90 TABLET | Refills: 1 | Status: SHIPPED | OUTPATIENT
Start: 2020-09-03 | End: 2021-04-12

## 2020-09-24 ENCOUNTER — OFFICE VISIT (OUTPATIENT)
Dept: ORTHOPEDIC SURGERY | Facility: CLINIC | Age: 85
End: 2020-09-24

## 2020-09-24 VITALS — HEIGHT: 62 IN | WEIGHT: 198 LBS | BODY MASS INDEX: 36.44 KG/M2 | TEMPERATURE: 97.2 F

## 2020-09-24 DIAGNOSIS — M17.12 PRIMARY OSTEOARTHRITIS OF LEFT KNEE: Primary | ICD-10-CM

## 2020-09-24 PROCEDURE — 99213 OFFICE O/P EST LOW 20 MIN: CPT | Performed by: ORTHOPAEDIC SURGERY

## 2020-09-24 NOTE — PROGRESS NOTES
Patient: Ghada Ryan  YOB: 1935 84 y.o. female  Medical Record Number: [id number]    Chief Complaints:   Chief Complaint   Patient presents with   • Left Knee - Follow-up       History of Present Illness:Ghada Ryan is a 84 y.o. female who presents for follow-up of  Left knee pain - moderate to severe medial ache wirse with activity, has worsened over past month    Allergies:   Allergies   Allergen Reactions   • Contrast Dye Hives       Medications:   Current Outpatient Medications   Medication Sig Dispense Refill   • Acetaminophen (TYLENOL ARTHRITIS PAIN PO) Take  by mouth As Needed.     • albuterol (PROVENTIL HFA;VENTOLIN HFA) 108 (90 BASE) MCG/ACT inhaler Inhale 2 puffs Every 6 (Six) Hours As Needed.     • aspirin 81 MG tablet Take 81 mg by mouth.     • Fluticasone Furoate-Vilanterol (BREO ELLIPTA) 100-25 MCG/INH aerosol powder  Inhale 1 puff Daily.     • levothyroxine (SYNTHROID, LEVOTHROID) 25 MCG tablet Take 1 tablet by mouth Daily. 90 tablet 1   • losartan (COZAAR) 100 MG tablet TAKE 1 TABLET BY MOUTH EVERY DAY 90 tablet 3   • omeprazole (priLOSEC) 20 MG capsule Take 1 capsule by mouth Daily. 90 capsule 3   • polyethylene glycol (MIRALAX) powder TAKE 17 G BY MOUTH DAILY. 255 g 0   • sertraline (ZOLOFT) 50 MG tablet TAKE 1 TABLET BY MOUTH DAILY. 90 tablet 1   • simvastatin (ZOCOR) 40 MG tablet TAKE 1 TABLET BY MOUTH EVERY DAY AT NIGHT 90 tablet 1   • Spacer/Aero-Holding Chambers (E-Z SPACER) inhaler Use as instructed 1 each 2     No current facility-administered medications for this visit.          The following portions of the patient's history were reviewed and updated as appropriate: allergies, current medications, past family history, past medical history, past social history, past surgical history and problem list.    Review of Systems:   A 14 point review of systems was performed. All systems negative except pertinent positives/negative listed in HPI  "above    Physical Exam:   Vitals:    09/24/20 1342   Temp: 97.2 °F (36.2 °C)   TempSrc: Temporal   Weight: 89.8 kg (198 lb)   Height: 157.5 cm (62\")       General: A and O x 3, ASA, NAD    SCLERA:    Normal    DENTITION:   Normal  Knee:  left    ALIGNMENT:     Varus  ,   Patella  tracks  midline    GAIT:    Antalgic    SKIN:    No abnormality    RANGE OF MOTION:   2  -  120   DEG    STRENGTH:   4  / 5    LIGAMENTS:    No varus / valgus instability.   Negative  Lachman.    MENISCUS:     Negative   Beena       DISTAL PULSES:    Paplable    DISTAL SENSATION :   Intact    LYMPHATICS:     No   lymphadenopathy    OTHER:          - Positive   effusion      - Crepitance with ROM       Radiology:  Xrays 3views left knee  (ap,lateral, sunrise) taken previously demonstratingadvanced varus osteoarthritis with bone on bone articulation, subchondral cysts, and periarticular osteophytes      Assessment/Plan:  lef tknee OA  - gave script for NSAID gel - apply BID, if worsens we could consider TKA but will hope to avoid this.  "

## 2020-09-26 DIAGNOSIS — F41.9 ANXIETY: ICD-10-CM

## 2020-09-28 RX ORDER — OMEPRAZOLE 20 MG/1
CAPSULE, DELAYED RELEASE ORAL
Qty: 90 CAPSULE | Refills: 3 | Status: SHIPPED | OUTPATIENT
Start: 2020-09-28 | End: 2021-09-20

## 2020-11-13 RX ORDER — SIMVASTATIN 40 MG
TABLET ORAL
Qty: 90 TABLET | Refills: 1 | Status: SHIPPED | OUTPATIENT
Start: 2020-11-13 | End: 2021-06-07

## 2020-12-01 ENCOUNTER — TELEPHONE (OUTPATIENT)
Dept: ORTHOPEDIC SURGERY | Facility: CLINIC | Age: 85
End: 2020-12-01

## 2020-12-01 NOTE — TELEPHONE ENCOUNTER
Caller: SISTER BRITTANY BOLTON    Relationship to patient: SELF    Best call back number: [phone]        Additional notes:PATIENT WOULD LIKE TO SPEAK WITH YOU REGARDING HER LEFT KNEE.  SHE JUST WANTS SOME ADVICE ON WHAT TO DO AND SAID SHE HAD A GREAT EXPERIENCE WITH YOU LAST TIME.  SHE IS THINKING ABOUT HAVING LEFT KNEE REPLACED

## 2020-12-02 NOTE — TELEPHONE ENCOUNTER
Call returned to pt and she has an apt with [facility] 1-7-21 tp discuss TKA. She is using tylenol and not using a walker or cane because she is too proud. Advised to call PCP to get surgical clearance to have at [facility] apt with [facility] and continue RICE.

## 2021-01-26 ENCOUNTER — OFFICE VISIT (OUTPATIENT)
Dept: FAMILY MEDICINE CLINIC | Facility: CLINIC | Age: 86
End: 2021-01-26

## 2021-01-26 VITALS — HEIGHT: 62 IN | BODY MASS INDEX: 35.88 KG/M2 | WEIGHT: 195 LBS | RESPIRATION RATE: 16 BRPM

## 2021-01-26 DIAGNOSIS — I10 ESSENTIAL HYPERTENSION: Primary | ICD-10-CM

## 2021-01-26 DIAGNOSIS — M17.12 ARTHRITIS OF LEFT KNEE: ICD-10-CM

## 2021-01-26 PROCEDURE — 99442 PR PHYS/QHP TELEPHONE EVALUATION 11-20 MIN: CPT | Performed by: FAMILY MEDICINE

## 2021-01-26 RX ORDER — TIMOLOL MALEATE 5 MG/ML
1 SOLUTION/ DROPS OPHTHALMIC 2 TIMES DAILY
COMMUNITY
Start: 2021-01-15

## 2021-01-26 NOTE — PROGRESS NOTES
Subjective   Ghada Ryan is a 85 y.o. female.   Hypertension and left knee pain  You have chosen to receive care through a telephone visit. Do you consent to use a telephone visit for your medical care today? Yes  History of Present Illness   She has a bad knee and she is going to see Dr. Forrest for pain in her left knee. She is going to see what his recommendation is.  She has known arthritis in this knee and she had a similar situation with her opposite knee.  She is trying to weigh the risks and benefits of her age versus surgery.  I assured her that Dr. Forrest can help her sort out that problem.  Ghada has chronic hypertension and has been well controlled on current medications.She  is monitored by me every 6 months and is here today for follow up. She is tolerating medications without side effect. She reports no vision changes, headaches or lightheadedness. She is requesting refills of medications.    The following portions of the patient's history were reviewed and updated as appropriate: allergies, current medications, past family history, past medical history, past social history, past surgical history and problem list.    Review of Systems    Objective   Virtual Visit Physical Exam  Telephone visit    Assessment/Plan   Problem List Items Addressed This Visit        Cardiac and Vasculature    Hypertension - Primary    Overview     She is taking losartan 100 mg and is tolerating well. She will follow up with me every 6 months.            Other Visit Diagnoses     Arthritis of left knee      I have encouraged her to discuss all her concerns with Dr. Forrest and she will do that at her next visit and she can follow-up with me at any time.          Ghada Ryan chose to receive care through a telephone visit today. She consented to use a telephone visit for her medical care today.  This visit was scheduled as a phone visit to comply with patient safety concerns in accordance with CDC  recommendations.  Time spent caring for the patient was  13 minutes.       Return if symptoms worsen or fail to improve.

## 2021-02-09 ENCOUNTER — OFFICE VISIT (OUTPATIENT)
Dept: ORTHOPEDIC SURGERY | Facility: CLINIC | Age: 86
End: 2021-02-09

## 2021-02-09 VITALS — WEIGHT: 195 LBS | BODY MASS INDEX: 35.88 KG/M2 | TEMPERATURE: 97.2 F | HEIGHT: 62 IN

## 2021-02-09 DIAGNOSIS — M17.12 PRIMARY OSTEOARTHRITIS OF LEFT KNEE: Primary | ICD-10-CM

## 2021-02-09 PROCEDURE — 99213 OFFICE O/P EST LOW 20 MIN: CPT | Performed by: ORTHOPAEDIC SURGERY

## 2021-02-09 NOTE — PROGRESS NOTES
Patient: Ghada Ryan  YOB: 1935 85 y.o. female  Medical Record Number: [id number]    Chief Complaints:   Chief Complaint   Patient presents with   • Left Knee - Follow-up       History of Present Illness:Ghada Ryan is a 85 y.o. female who presents for follow-up of chronic left knee pain that is progressively worsening.  Patient states that her pain is constant now and daily.  She reports it is an 8 out of 10.  Patient states the pain is located to the medial aspect of her knee and radiates to the posterior aspect.  She reports pain is worsening going from sitting to standing position or going up and down stairs.  Patient states sitting down for short periods of time is only gives her any type of rest.  Patient reports that she has had physical therapy and a steroid shot in the past with very minimal relief.  Patient reports that Dr. Forrest did a total knee replacement on her right knee proximately 3 years ago and she has done really well after this procedure.  Patient is interested in talking with Dr. Forrest today about further treatment options.    Allergies:   Allergies   Allergen Reactions   • Contrast Dye Hives       Medications:   Current Outpatient Medications   Medication Sig Dispense Refill   • Acetaminophen (TYLENOL ARTHRITIS PAIN PO) Take  by mouth As Needed.     • albuterol (PROVENTIL HFA;VENTOLIN HFA) 108 (90 BASE) MCG/ACT inhaler Inhale 2 puffs Every 6 (Six) Hours As Needed.     • aspirin 81 MG tablet Take 81 mg by mouth.     • Fluticasone Furoate-Vilanterol (BREO ELLIPTA) 100-25 MCG/INH aerosol powder  Inhale 1 puff Daily.     • levothyroxine (SYNTHROID, LEVOTHROID) 25 MCG tablet Take 1 tablet by mouth Daily. 90 tablet 1   • losartan (COZAAR) 100 MG tablet TAKE 1 TABLET BY MOUTH EVERY DAY 90 tablet 3   • omeprazole (priLOSEC) 20 MG capsule TAKE 1 CAPSULE BY MOUTH EVERY DAY 90 capsule 3   • polyethylene glycol (MIRALAX) powder TAKE 17 G BY MOUTH DAILY. 255 g 0   •  "sertraline (ZOLOFT) 50 MG tablet TAKE 1 TABLET BY MOUTH EVERY DAY 90 tablet 3   • simvastatin (ZOCOR) 40 MG tablet TAKE 1 TABLET BY MOUTH EVERY DAY AT NIGHT 90 tablet 1   • Spacer/Aero-Holding Chambers (E-Z SPACER) inhaler Use as instructed 1 each 2   • timolol (TIMOPTIC) 0.5 % ophthalmic solution INSTILL 1 DROP INTO BOTH EYES TWICE A DAY       No current facility-administered medications for this visit.          The following portions of the patient's history were reviewed and updated as appropriate: allergies, current medications, past family history, past medical history, past social history, past surgical history and problem list.    Review of Systems:   A 14 point review of systems was performed. All systems negative except pertinent positives/negative listed in HPI above    Physical Exam:   Vitals:    02/09/21 0758   Temp: 97.2 °F (36.2 °C)   Weight: 88.5 kg (195 lb)   Height: 157.5 cm (62\")   PainSc:   5       General: A and O x 3, ASA, NAD    SCLERA:    Normal    DENTITION:   Normal    Knee:  left    ALIGNMENT:     Varus  ,   Patella  tracks  midline    GAIT:    Antalgic    SKIN:    No abnormality    RANGE OF MOTION:   0  -  125   DEG    STRENGTH:   4  / 5    LIGAMENTS:    No varus / valgus instability.   Negative  Lachman.    MENISCUS:     Negative   Beena       DISTAL PULSES:    Paplable    DISTAL SENSATION :   Intact    LYMPHATICS:     No   lymphadenopathy    OTHER:          - Positive   effusion      + Crepitance with ROM         Radiology:  Xrays 3views (ap,lateral, sunrise) taken previously demonstrating moderate joint space narrowing with bone-on-bone contact in the patellofemoral compartment.  X-rays does show slight progression of osteoarthritis in comparison to previous studies.      Assessment/Plan: Left knee OA    Continuation of conservative management vs. TKA discussed.  The patient wishes to proceed with total knee replacement.  At this point the patient has failed the full compliment of " conservative treatment and stating complete understanding of the risks/benefits/ anternatives wishes to proceed with surgical treatment.    Risk and benefits of surgery were reviewed.  Including, but not limited to, blood clots or pulmonary embolism, anesthesia risk, infection, fracture, skin/leg numbness, persistent pain/crepitance/popping/catching, failure of the implant, need for future surgeries, hematoma, possible nerve or blood vessel injury, need for transfusion, and potential risk of stroke,heart attack or death, among others.  The patient understands and wishes to proceed.     It was explained that if tissue has been repaired or reconstructed, there is also an increased chance of failure which may require further management.  Following the completion of the discussion, the patient expressed understanding of this planned course of care, all their questions were answered and consent will be obtained preoperatively.    Operative Plan: [facility] Total Knee Replacement an overnight staywith home health rehab.  Dr. Forrest would like the patient to have medical clearance by their primary care physician as well as a cardiology evaluation before we proceed with surgery.  We will defer to Dr. Connolly for a cardiology referral.  We will be waiting to hear back from Dr. Connolly and the cardiologist before proceeding forward with surgery.      This patient was seen in conjunction today with Dr. Demond Forrest.  Dr. Forrest agrees with the above-stated assessment and plan.

## 2021-03-05 DIAGNOSIS — I10 ESSENTIAL HYPERTENSION: Primary | ICD-10-CM

## 2021-03-05 RX ORDER — LOSARTAN POTASSIUM 100 MG/1
TABLET ORAL
Qty: 90 TABLET | Refills: 3 | Status: SHIPPED | OUTPATIENT
Start: 2021-03-05 | End: 2022-02-14

## 2021-03-16 ENCOUNTER — TELEPHONE (OUTPATIENT)
Dept: FAMILY MEDICINE CLINIC | Facility: CLINIC | Age: 86
End: 2021-03-16

## 2021-03-16 NOTE — TELEPHONE ENCOUNTER
Sister is asking that I order a mammogram.  It is no longer on her care gaps.  Is there an age at which we stop?  Do I go ahead and order?

## 2021-03-16 NOTE — TELEPHONE ENCOUNTER
Patient states she needs a mammogram order placed and she will need an EKG as well for her upcoming knee surgery.    Patient can be reached at [phone] (ask for patient).

## 2021-04-11 DIAGNOSIS — E03.9 HYPOTHYROIDISM, UNSPECIFIED TYPE: ICD-10-CM

## 2021-04-12 RX ORDER — LEVOTHYROXINE SODIUM 0.03 MG/1
TABLET ORAL
Qty: 30 TABLET | Refills: 1 | Status: SHIPPED | OUTPATIENT
Start: 2021-04-12 | End: 2021-05-06

## 2021-04-13 DIAGNOSIS — E78.2 MIXED HYPERLIPIDEMIA: Primary | ICD-10-CM

## 2021-04-13 DIAGNOSIS — Z79.899 HIGH RISK MEDICATION USE: ICD-10-CM

## 2021-04-13 DIAGNOSIS — E03.8 OTHER SPECIFIED HYPOTHYROIDISM: ICD-10-CM

## 2021-04-14 LAB
ALBUMIN SERPL-MCNC: 4.2 G/DL (ref 3.6–4.6)
ALBUMIN/GLOB SERPL: 1.6 {RATIO} (ref 1.2–2.2)
ALP SERPL-CCNC: 81 IU/L (ref 39–117)
ALT SERPL-CCNC: 21 IU/L (ref 0–32)
AST SERPL-CCNC: 30 IU/L (ref 0–40)
BILIRUB SERPL-MCNC: <0.2 MG/DL (ref 0–1.2)
BUN SERPL-MCNC: 24 MG/DL (ref 8–27)
BUN/CREAT SERPL: 24 (ref 12–28)
CALCIUM SERPL-MCNC: 9 MG/DL (ref 8.7–10.3)
CHLORIDE SERPL-SCNC: 101 MMOL/L (ref 96–106)
CHOLEST SERPL-MCNC: 182 MG/DL (ref 100–199)
CO2 SERPL-SCNC: 25 MMOL/L (ref 20–29)
CREAT SERPL-MCNC: 1.01 MG/DL (ref 0.57–1)
ERYTHROCYTE [DISTWIDTH] IN BLOOD BY AUTOMATED COUNT: 13.9 % (ref 11.7–15.4)
GLOBULIN SER CALC-MCNC: 2.7 G/DL (ref 1.5–4.5)
GLUCOSE SERPL-MCNC: 122 MG/DL (ref 65–99)
HCT VFR BLD AUTO: 35.1 % (ref 34–46.6)
HDLC SERPL-MCNC: 77 MG/DL
HGB BLD-MCNC: 11.6 G/DL (ref 11.1–15.9)
LDLC SERPL CALC-MCNC: 89 MG/DL (ref 0–99)
LDLC/HDLC SERPL: 1.2 RATIO (ref 0–3.2)
MCH RBC QN AUTO: 29.8 PG (ref 26.6–33)
MCHC RBC AUTO-ENTMCNC: 33 G/DL (ref 31.5–35.7)
MCV RBC AUTO: 90 FL (ref 79–97)
PLATELET # BLD AUTO: 250 X10E3/UL (ref 150–450)
POTASSIUM SERPL-SCNC: 4.6 MMOL/L (ref 3.5–5.2)
PROT SERPL-MCNC: 6.9 G/DL (ref 6–8.5)
RBC # BLD AUTO: 3.89 X10E6/UL (ref 3.77–5.28)
SODIUM SERPL-SCNC: 138 MMOL/L (ref 134–144)
TRIGL SERPL-MCNC: 91 MG/DL (ref 0–149)
TSH SERPL DL<=0.005 MIU/L-ACNC: 1.87 UIU/ML (ref 0.45–4.5)
VLDLC SERPL CALC-MCNC: 16 MG/DL (ref 5–40)
WBC # BLD AUTO: 8 X10E3/UL (ref 3.4–10.8)

## 2021-04-30 ENCOUNTER — TELEPHONE (OUTPATIENT)
Dept: FAMILY MEDICINE CLINIC | Facility: CLINIC | Age: 86
End: 2021-04-30

## 2021-04-30 NOTE — TELEPHONE ENCOUNTER
Caller: Ghada Ryan    Relationship: Self    Best call back number: [phone]     What is the best time to reach you: ANYTIME    Who are you requesting to speak with (clinical staff, provider,  specific staff member):  OR NURSE    Do you know the name of the person who called: NA    What was the call regarding: PATIENT IS NEEDING AN EKG FOR HER SURGERY CLEARANCE SHE WOULD LIKE TO KNOW IF THIS CAN BE DONE IN OFFICE OR WOULD SHE NEED A REFERRAL.    Do you require a callback: YES

## 2021-05-03 ENCOUNTER — TELEPHONE (OUTPATIENT)
Dept: ORTHOPEDIC SURGERY | Facility: CLINIC | Age: 86
End: 2021-05-03

## 2021-05-06 DIAGNOSIS — E03.9 HYPOTHYROIDISM, UNSPECIFIED TYPE: ICD-10-CM

## 2021-05-06 RX ORDER — LEVOTHYROXINE SODIUM 0.03 MG/1
TABLET ORAL
Qty: 30 TABLET | Refills: 1 | Status: SHIPPED | OUTPATIENT
Start: 2021-05-06 | End: 2021-06-08

## 2021-05-25 ENCOUNTER — OFFICE VISIT (OUTPATIENT)
Dept: CARDIOLOGY | Facility: CLINIC | Age: 86
End: 2021-05-25

## 2021-05-25 VITALS
SYSTOLIC BLOOD PRESSURE: 160 MMHG | HEART RATE: 61 BPM | DIASTOLIC BLOOD PRESSURE: 90 MMHG | WEIGHT: 200.8 LBS | HEIGHT: 62 IN | BODY MASS INDEX: 36.95 KG/M2 | OXYGEN SATURATION: 98 %

## 2021-05-25 DIAGNOSIS — I10 ESSENTIAL HYPERTENSION: ICD-10-CM

## 2021-05-25 DIAGNOSIS — Z01.810 PREOP CARDIOVASCULAR EXAM: Primary | ICD-10-CM

## 2021-05-25 PROCEDURE — 99204 OFFICE O/P NEW MOD 45 MIN: CPT | Performed by: INTERNAL MEDICINE

## 2021-05-28 NOTE — PROGRESS NOTES
"      CARDIOLOGY    Evan Baxter MD    ENCOUNTER DATE:  05/25/2021    Ghada Ryan / 85 y.o. / female        CHIEF COMPLAINT / REASON FOR OFFICE VISIT     Surgical Clearance  Hypertension    HISTORY OF PRESENT ILLNESS       HPI  Ghada yRan is a 85 y.o. female who presents today for preoperative risk assessment.  Patient is currently going to have her left knee replaced by Dr. Burke Forrest.  Patient is a [employer] at [employer] very active.  She had she can walk a flight of stairs with no difficulties.  She denies chest pain shortness of breath lightheadedness or fatigue.  She does get occasional ankle swelling.  Patient had multiple surgeries over the years 2017 she had a colon resection 2018 she had a parathyroid as well as a knee surgery in 2019 she had a hernia repair.  She did well with all the surgeries and did not have any types of issues.      The following portions of the patient's history were reviewed and updated as appropriate: allergies, current medications, past family history, past medical history, past social history, past surgical history and problem list.      VITAL SIGNS     Visit Vitals  /90 (BP Location: Left arm)   Pulse 61   Ht 157.5 cm (62\")   Wt 91.1 kg (200 lb 12.8 oz)   SpO2 98%   BMI 36.73 kg/m²         Wt Readings from Last 3 Encounters:   05/25/21 91.1 kg (200 lb 12.8 oz)   02/09/21 88.5 kg (195 lb)   01/26/21 88.5 kg (195 lb)     Body mass index is 36.73 kg/m².      REVIEW OF SYSTEMS   Review of Systems   All other systems reviewed and are negative.          PHYSICAL EXAMINATION     Constitutional:       Appearance: Healthy appearance.   Pulmonary:      Effort: Pulmonary effort is normal.      Breath sounds: Normal breath sounds.   Cardiovascular:      Normal rate. Regular rhythm. Normal S1. Normal S2.      Murmurs: There is no murmur.      No gallop. No click. No rub.   Pulses:     Intact distal pulses.   Edema:     Peripheral " edema absent.   Neurological:      Mental Status: Alert and oriented to person, place and time.           REVIEWED DATA     Procedures    Cardiac Procedures:  1.     Lipid Panel    Lipid Panel 4/13/21   Total Cholesterol 182   Triglycerides 91   HDL Cholesterol 77   VLDL Cholesterol 16   LDL Cholesterol  89   LDL/HDL Ratio 1.2      Comments are available for some flowsheets but are not being displayed.               ASSESSMENT & PLAN      Diagnosis Plan   1. Preop cardiovascular exam     2. Essential hypertension           SUMMARY/DISCUSSION  1. Preoperative risk assessment.  Patient can do 4 METS with no issues.  I did look at the EKG that was performed at [facility] on 4/11/2021.  Patient has some poor R wave progression but this is probably secondary to the left anterior fascicular block.  Per the report there is no significant interval change in a prior ECG that dates back to 2/29/2020.  With no significant EKG changes reasonable exercise tolerance and no symptoms I would proceed with surgery as clinically indicated with no further cardiac testing at this time.  2.  Hypertension blood pressure is little bit high today.  She is having some discomfort from her knee this will need to be followed up postoperatively.  If her blood pressure runs high perioperatively always difficult because the fact that patients are in discomfort from her surgeries.  This may need better control will defer to her primary care provider at this point.      MEDICATIONS         Discharge Medications          Accurate as of May 25, 2021 11:59 PM. If you have any questions, ask your nurse or doctor.            Continue These Medications      Instructions Start Date   albuterol sulfate  (90 Base) MCG/ACT inhaler  Commonly known as: PROVENTIL HFA;VENTOLIN HFA;PROAIR HFA   2 puffs, Inhalation, Every 6 Hours PRN      aspirin 81 MG tablet   81 mg, Oral      Breo Ellipta 100-25 MCG/INH inhaler  Generic drug: Fluticasone  Furoate-Vilanterol   1 puff, Inhalation, Daily      levothyroxine 25 MCG tablet  Commonly known as: SYNTHROID, LEVOTHROID   TAKE 1 TABLET BY MOUTH EVERY DAY      losartan 100 MG tablet  Commonly known as: COZAAR   TAKE 1 TABLET BY MOUTH EVERY DAY      omeprazole 20 MG capsule  Commonly known as: priLOSEC   TAKE 1 CAPSULE BY MOUTH EVERY DAY      polyethylene glycol 17 GM/SCOOP powder  Commonly known as: MIRALAX   17 g, Oral, Daily      sertraline 50 MG tablet  Commonly known as: ZOLOFT   TAKE 1 TABLET BY MOUTH EVERY DAY      simvastatin 40 MG tablet  Commonly known as: ZOCOR   TAKE 1 TABLET BY MOUTH EVERY DAY AT NIGHT      timolol 0.5 % ophthalmic solution  Commonly known as: TIMOPTIC   INSTILL 1 DROP INTO BOTH EYES TWICE A DAY      TYLENOL ARTHRITIS PAIN PO   Oral, As Needed                 **Dragon Disclaimer:   Much of this encounter note is an electronic transcription/translation of spoken language to printed text. The electronic translation of spoken language may permit erroneous, or at times, nonsensical words or phrases to be inadvertently transcribed. Although I have reviewed the note for such errors, some may still exist.

## 2021-06-07 RX ORDER — SIMVASTATIN 40 MG
TABLET ORAL
Qty: 90 TABLET | Refills: 1 | Status: SHIPPED | OUTPATIENT
Start: 2021-06-07 | End: 2022-02-14

## 2021-06-08 DIAGNOSIS — E03.9 HYPOTHYROIDISM, UNSPECIFIED TYPE: ICD-10-CM

## 2021-06-08 RX ORDER — LEVOTHYROXINE SODIUM 0.03 MG/1
TABLET ORAL
Qty: 30 TABLET | Refills: 5 | Status: SHIPPED | OUTPATIENT
Start: 2021-06-08 | End: 2021-08-20

## 2021-07-29 ENCOUNTER — TELEPHONE (OUTPATIENT)
Dept: ORTHOPEDIC SURGERY | Facility: CLINIC | Age: 86
End: 2021-07-29

## 2021-07-30 DIAGNOSIS — M17.12 PRIMARY OSTEOARTHRITIS OF LEFT KNEE: Primary | ICD-10-CM

## 2021-07-30 PROBLEM — M17.9 OA (OSTEOARTHRITIS) OF KNEE: Status: ACTIVE | Noted: 2021-07-30

## 2021-07-30 RX ORDER — CEFAZOLIN SODIUM 2 G/100ML
2 INJECTION, SOLUTION INTRAVENOUS ONCE
Status: CANCELLED | OUTPATIENT
Start: 2021-09-15 | End: 2021-07-30

## 2021-07-30 RX ORDER — CHLORHEXIDINE GLUCONATE 500 MG/1
CLOTH TOPICAL 2 TIMES DAILY
Status: CANCELLED | OUTPATIENT
Start: 2021-07-30

## 2021-07-30 RX ORDER — PREGABALIN 75 MG/1
150 CAPSULE ORAL ONCE
Status: CANCELLED | OUTPATIENT
Start: 2021-09-15 | End: 2021-07-30

## 2021-07-30 RX ORDER — MELOXICAM 15 MG/1
15 TABLET ORAL ONCE
Status: CANCELLED | OUTPATIENT
Start: 2021-09-15 | End: 2021-07-30

## 2021-08-20 DIAGNOSIS — E03.9 HYPOTHYROIDISM, UNSPECIFIED TYPE: ICD-10-CM

## 2021-08-20 RX ORDER — LEVOTHYROXINE SODIUM 0.03 MG/1
TABLET ORAL
Qty: 30 TABLET | Refills: 1 | Status: SHIPPED | OUTPATIENT
Start: 2021-08-20 | End: 2021-09-17 | Stop reason: SDUPTHER

## 2021-09-08 ENCOUNTER — APPOINTMENT (OUTPATIENT)
Dept: CT IMAGING | Facility: HOSPITAL | Age: 86
End: 2021-09-08

## 2021-09-08 ENCOUNTER — HOSPITAL ENCOUNTER (EMERGENCY)
Facility: HOSPITAL | Age: 86
Discharge: HOME | End: 2021-09-08
Attending: EMERGENCY MEDICINE | Admitting: EMERGENCY MEDICINE

## 2021-09-08 VITALS
WEIGHT: 201 LBS | HEIGHT: 62 IN | OXYGEN SATURATION: 90 % | BODY MASS INDEX: 36.99 KG/M2 | RESPIRATION RATE: 18 BRPM | TEMPERATURE: 96.8 F | DIASTOLIC BLOOD PRESSURE: 79 MMHG | SYSTOLIC BLOOD PRESSURE: 142 MMHG | HEART RATE: 70 BPM

## 2021-09-08 DIAGNOSIS — M17.12 OSTEOARTHRITIS OF LEFT KNEE, UNSPECIFIED OSTEOARTHRITIS TYPE: ICD-10-CM

## 2021-09-08 DIAGNOSIS — M25.552 LEFT HIP PAIN: ICD-10-CM

## 2021-09-08 DIAGNOSIS — M25.562 CHRONIC PAIN OF LEFT KNEE: Primary | ICD-10-CM

## 2021-09-08 DIAGNOSIS — G89.29 CHRONIC PAIN OF LEFT KNEE: Primary | ICD-10-CM

## 2021-09-08 LAB
ALBUMIN SERPL-MCNC: 4 G/DL (ref 3.5–5.2)
ALBUMIN/GLOB SERPL: 1.3 G/DL
ALP SERPL-CCNC: 84 U/L (ref 39–117)
ALT SERPL W P-5'-P-CCNC: 17 U/L (ref 1–33)
ANION GAP SERPL CALCULATED.3IONS-SCNC: 10.1 MMOL/L (ref 5–15)
AST SERPL-CCNC: 18 U/L (ref 1–32)
BACTERIA UR QL AUTO: ABNORMAL /HPF
BASOPHILS # BLD AUTO: 0.02 10*3/MM3 (ref 0–0.2)
BASOPHILS NFR BLD AUTO: 0.2 % (ref 0–1.5)
BILIRUB SERPL-MCNC: 0.5 MG/DL (ref 0–1.2)
BILIRUB UR QL STRIP: NEGATIVE
BUN SERPL-MCNC: 15 MG/DL (ref 8–23)
BUN/CREAT SERPL: 21.1 (ref 7–25)
CALCIUM SPEC-SCNC: 9.1 MG/DL (ref 8.6–10.5)
CHLORIDE SERPL-SCNC: 96 MMOL/L (ref 98–107)
CLARITY UR: CLEAR
CO2 SERPL-SCNC: 26.9 MMOL/L (ref 22–29)
COLOR UR: YELLOW
CREAT SERPL-MCNC: 0.71 MG/DL (ref 0.57–1)
DEPRECATED RDW RBC AUTO: 43.5 FL (ref 37–54)
EOSINOPHIL # BLD AUTO: 0.01 10*3/MM3 (ref 0–0.4)
EOSINOPHIL NFR BLD AUTO: 0.1 % (ref 0.3–6.2)
ERYTHROCYTE [DISTWIDTH] IN BLOOD BY AUTOMATED COUNT: 13.5 % (ref 12.3–15.4)
GFR SERPL CREATININE-BSD FRML MDRD: 78 ML/MIN/1.73
GLOBULIN UR ELPH-MCNC: 3.1 GM/DL
GLUCOSE SERPL-MCNC: 101 MG/DL (ref 65–99)
GLUCOSE UR STRIP-MCNC: NEGATIVE MG/DL
HCT VFR BLD AUTO: 36.5 % (ref 34–46.6)
HGB BLD-MCNC: 12.1 G/DL (ref 12–15.9)
HGB UR QL STRIP.AUTO: NEGATIVE
HOLD SPECIMEN: NORMAL
HOLD SPECIMEN: NORMAL
HYALINE CASTS UR QL AUTO: ABNORMAL /LPF
IMM GRANULOCYTES # BLD AUTO: 0.03 10*3/MM3 (ref 0–0.05)
IMM GRANULOCYTES NFR BLD AUTO: 0.4 % (ref 0–0.5)
KETONES UR QL STRIP: NEGATIVE
LEUKOCYTE ESTERASE UR QL STRIP.AUTO: ABNORMAL
LIPASE SERPL-CCNC: 28 U/L (ref 13–60)
LYMPHOCYTES # BLD AUTO: 1.16 10*3/MM3 (ref 0.7–3.1)
LYMPHOCYTES NFR BLD AUTO: 14.1 % (ref 19.6–45.3)
MCH RBC QN AUTO: 29.4 PG (ref 26.6–33)
MCHC RBC AUTO-ENTMCNC: 33.2 G/DL (ref 31.5–35.7)
MCV RBC AUTO: 88.6 FL (ref 79–97)
MONOCYTES # BLD AUTO: 0.84 10*3/MM3 (ref 0.1–0.9)
MONOCYTES NFR BLD AUTO: 10.2 % (ref 5–12)
NEUTROPHILS NFR BLD AUTO: 6.14 10*3/MM3 (ref 1.7–7)
NEUTROPHILS NFR BLD AUTO: 75 % (ref 42.7–76)
NITRITE UR QL STRIP: NEGATIVE
NRBC BLD AUTO-RTO: 0 /100 WBC (ref 0–0.2)
PH UR STRIP.AUTO: 7 [PH] (ref 5–8)
PLATELET # BLD AUTO: 227 10*3/MM3 (ref 140–450)
PMV BLD AUTO: 8.9 FL (ref 6–12)
POTASSIUM SERPL-SCNC: 4 MMOL/L (ref 3.5–5.2)
PROT SERPL-MCNC: 7.1 G/DL (ref 6–8.5)
PROT UR QL STRIP: NEGATIVE
RBC # BLD AUTO: 4.12 10*6/MM3 (ref 3.77–5.28)
RBC # UR: ABNORMAL /HPF
REF LAB TEST METHOD: ABNORMAL
SODIUM SERPL-SCNC: 133 MMOL/L (ref 136–145)
SP GR UR STRIP: 1.02 (ref 1–1.03)
SQUAMOUS #/AREA URNS HPF: ABNORMAL /HPF
UROBILINOGEN UR QL STRIP: ABNORMAL
WBC # BLD AUTO: 8.2 10*3/MM3 (ref 3.4–10.8)
WBC UR QL AUTO: ABNORMAL /HPF
WHOLE BLOOD HOLD SPECIMEN: NORMAL
WHOLE BLOOD HOLD SPECIMEN: NORMAL

## 2021-09-08 PROCEDURE — 96374 THER/PROPH/DIAG INJ IV PUSH: CPT

## 2021-09-08 PROCEDURE — 80053 COMPREHEN METABOLIC PANEL: CPT

## 2021-09-08 PROCEDURE — 81001 URINALYSIS AUTO W/SCOPE: CPT | Performed by: EMERGENCY MEDICINE

## 2021-09-08 PROCEDURE — 25010000002 MORPHINE PER 10 MG: Performed by: EMERGENCY MEDICINE

## 2021-09-08 PROCEDURE — 74176 CT ABD & PELVIS W/O CONTRAST: CPT

## 2021-09-08 PROCEDURE — 36415 COLL VENOUS BLD VENIPUNCTURE: CPT

## 2021-09-08 PROCEDURE — 25010000002 ONDANSETRON PER 1 MG: Performed by: EMERGENCY MEDICINE

## 2021-09-08 PROCEDURE — 99283 EMERGENCY DEPT VISIT LOW MDM: CPT

## 2021-09-08 PROCEDURE — 96375 TX/PRO/DX INJ NEW DRUG ADDON: CPT

## 2021-09-08 PROCEDURE — P9612 CATHETERIZE FOR URINE SPEC: HCPCS

## 2021-09-08 PROCEDURE — 83690 ASSAY OF LIPASE: CPT

## 2021-09-08 PROCEDURE — 85025 COMPLETE CBC W/AUTO DIFF WBC: CPT

## 2021-09-08 PROCEDURE — 96376 TX/PRO/DX INJ SAME DRUG ADON: CPT

## 2021-09-08 RX ORDER — MORPHINE SULFATE 2 MG/ML
4 INJECTION, SOLUTION INTRAMUSCULAR; INTRAVENOUS ONCE
Status: COMPLETED | OUTPATIENT
Start: 2021-09-08 | End: 2021-09-08

## 2021-09-08 RX ORDER — MORPHINE SULFATE 2 MG/ML
2 INJECTION, SOLUTION INTRAMUSCULAR; INTRAVENOUS ONCE
Status: COMPLETED | OUTPATIENT
Start: 2021-09-08 | End: 2021-09-08

## 2021-09-08 RX ORDER — SODIUM CHLORIDE 0.9 % (FLUSH) 0.9 %
10 SYRINGE (ML) INJECTION AS NEEDED
Status: DISCONTINUED | OUTPATIENT
Start: 2021-09-08 | End: 2021-09-08 | Stop reason: HOSPADM

## 2021-09-08 RX ORDER — ONDANSETRON 2 MG/ML
4 INJECTION INTRAMUSCULAR; INTRAVENOUS ONCE
Status: COMPLETED | OUTPATIENT
Start: 2021-09-08 | End: 2021-09-08

## 2021-09-08 RX ORDER — HYDROCODONE BITARTRATE AND ACETAMINOPHEN 5; 325 MG/1; MG/1
1 TABLET ORAL EVERY 6 HOURS PRN
Qty: 12 TABLET | Refills: 0 | Status: SHIPPED | OUTPATIENT
Start: 2021-09-08 | End: 2021-09-23

## 2021-09-08 RX ADMIN — SODIUM CHLORIDE, POTASSIUM CHLORIDE, SODIUM LACTATE AND CALCIUM CHLORIDE 1000 ML: 600; 310; 30; 20 INJECTION, SOLUTION INTRAVENOUS at 17:31

## 2021-09-08 RX ADMIN — SODIUM CHLORIDE, PRESERVATIVE FREE 10 ML: 5 INJECTION INTRAVENOUS at 19:40

## 2021-09-08 RX ADMIN — ONDANSETRON 4 MG: 2 INJECTION INTRAMUSCULAR; INTRAVENOUS at 17:29

## 2021-09-08 RX ADMIN — MORPHINE SULFATE 2 MG: 2 INJECTION, SOLUTION INTRAMUSCULAR; INTRAVENOUS at 17:30

## 2021-09-08 RX ADMIN — MORPHINE SULFATE 4 MG: 2 INJECTION, SOLUTION INTRAMUSCULAR; INTRAVENOUS at 18:29

## 2021-09-08 RX ADMIN — ONDANSETRON 4 MG: 2 INJECTION INTRAMUSCULAR; INTRAVENOUS at 19:40

## 2021-09-08 NOTE — ED PROVIDER NOTES
EMERGENCY DEPARTMENT ENCOUNTER    Room Number:  36/36  Date of encounter:  9/8/2021  PCP: Miguelito Connolly MD  Historian: Patient      HPI:  Chief Complaint: Lower abdominal pain, left knee pain  A complete HPI/ROS/PMH/PSH/SH/FH are unobtainable due to: None    Context: Ghada Ryan is a 85 y.o. female who presents to the ED via private vehicle for evaluation for several weeks of ongoing chronic left knee pains, with plans for total knee replacement.  This has been rescheduled due to Covid.  Also having intermittent lower abdominal pains for several weeks, worse in the last couple of days.  Pains are worse in the left lower quadrant with some radiation to the right side.  No aggravating or alleviating factors.  Patient does not take any medications for pain.  Denies any fevers, chills, cough, chest pain or short of breath.  Has had some nausea without any vomiting.  No change in bowel movements which are chronically on the loose to soft side due to prophylactic MiraLAX use.  Denies any dysuria or urinary frequency or urgency.      MEDICAL RECORD REVIEW    Discharge summary reviewed from August 19, 2017 after being admitted August 12 of 2017 secondary to diverticulitis of large intestine with perforation and abscess requiring a laparoscopic hand-assisted sigmoid colectomy    PAST MEDICAL HISTORY  Active Ambulatory Problems     Diagnosis Date Noted   • DJD (degenerative joint disease), ankle and foot 08/27/2017   • Primary osteoarthritis of both knees 08/27/2017   • GERD (gastroesophageal reflux disease) 09/26/2017   • Sleep apnea 09/26/2017   • Asthma 09/26/2017   • Hypothyroidism, acquired 09/26/2017   • Pulmonary nodule, right, followed by Dr Fung 09/30/2014   • Tubular adenoma of colon 10/31/2014   • Vitamin D insufficiency 01/17/2017   • Anemia 10/27/2017   • Anxiety 01/24/2018   • Age-related osteoporosis without current pathological fracture 04/29/2019   • Family history of ischemic heart  disease 12/14/2007   • Lower back pain 12/13/2007   • Other and unspecified hyperlipidemia 12/14/2007   • Hypertension 05/29/2019   • OA (osteoarthritis) of knee 07/30/2021     Resolved Ambulatory Problems     Diagnosis Date Noted   • Diverticulitis of large intestine with perforation and abscess without bleeding 08/12/2017   • Diverticulitis of colon with perforation 08/12/2017   • Hypercalcemia 09/26/2017   • Incisional hernia, without obstruction or gangrene 01/30/2019   • Incisional hernia 03/04/2019     Past Medical History:   Diagnosis Date   • Arthritis    • Baker's cyst of knee, left    • Colon polyps    • Diverticulitis 08/2017   • Diverticulosis    • DVT, lower extremity, distal (CMS/HCC)    • Elevated cholesterol    • Environmental allergies    • Glaucoma    • Hyperlipidemia    • Macular degeneration    • Meniscus degeneration    • Parathyroid disease (CMS/HCC)    • Seasonal allergies          PAST SURGICAL HISTORY  Past Surgical History:   Procedure Laterality Date   • ABDOMINAL HERNIA REPAIR Bilateral 03/04/2019   • BREAST LUMPECTOMY     • COLON RESECTION N/A 8/13/2017    Procedure: Laparoscopic hand assist sigmoid colectomy;  Surgeon: Cullen Munoz MD;  Location: [facility] OR;  Service:    • COLONOSCOPY N/A 7/18/2017    Diverticulosis in the sigmoid colon and in the descending colon, A single erosion in the descending colon, IH. PATH: NO INFLAMMATION, FIBROSIS, ULCERATION, OR GRANULOMA.   • COLONOSCOPY W/ POLYPECTOMY N/A approx 2013    pre cancerous polyps [place]-[facility]IGN   • THYROIDECTOMY Left 3/15/2018    Procedure: RIGHT AND LEFT SUPERIOR  PARATHYROID ADENOMA RESECTION;  Surgeon: Adamaris Bhandari MD;  [place] [facility] [place] [facility]  Service: General   • TOTAL KNEE ARTHROPLASTY Right 8/8/2018    Procedure: RT TOTAL KNEE ARTHROPLASTY;  Surgeon: Demond Forrest MD;  Location: [facility] OR;  Service: Orthopedics   • VENTRAL HERNIA REPAIR N/A 3/4/2019    Procedure: Incisional hernia repair with  bilateral component separation and mesh placement;  Surgeon: Cullen Munoz MD;  Location: [facility] MAIN OR;  Service: General   • WISDOM TOOTH EXTRACTION Bilateral          FAMILY HISTORY  Family History   Problem Relation Age of Onset   • Tuberculosis Mother    • Kidney cancer Father    • Stroke Maternal Grandfather    • Malig Hyperthermia Neg Hx          SOCIAL HISTORY  Social History     Socioeconomic History   • Marital status: Single     Spouse name: Not on file   • Number of children: Not on file   • Years of education: college   • Highest education level: Not on file   Tobacco Use   • Smoking status: Never Smoker   • Smokeless tobacco: Never Used   • Tobacco comment: caffeine use 1 cup coffee daily, occas soda   Substance and Sexual Activity   • Alcohol use: Yes     Comment: 1-2 DRINKS EVERY SIX MONTHS   • Drug use: No   • Sexual activity: Never         ALLERGIES  Contrast dye        REVIEW OF SYSTEMS  Review of Systems     All systems reviewed and negative except for those discussed in HPI.       PHYSICAL EXAM    I have reviewed the triage vital signs and nursing notes.    ED Triage Vitals   Temp Heart Rate Resp BP SpO2   09/08/21 1235 09/08/21 1235 09/08/21 1235 09/08/21 1238 09/08/21 1235   96.8 °F (36 °C) 104 18 (!) 158/101 94 %      Temp src Heart Rate Source Patient Position BP Location FiO2 (%)   -- -- -- -- --              Physical Exam  General: Awake, alert, nontoxic  HEENT: Mucous membranes moist, atraumatic, EOMI  Neck: Full ROM  Pulm: Symmetric chest rise, nonlabored, lungs CTAB  Cardiovascular: Regular rate and rhythm, intact distal pulses  GI: Soft, nontender, nondistended, no rebound, no guarding, bowel sounds present  MSK: Limited range of motion of left knee and hip due to pain without undue edema or ecchymosis or erythema or abnormal heat to the left knee  Skin: Warm, dry  Neuro: Alert and oriented x 3, GCS 15, moving all extremities, no focal deficits  Psych: Calm,  cooperative      Surgical mask, protective eye goggles, and gloves used during this encounter. Patient in surgical mask.      LAB RESULTS  Recent Results (from the past 24 hour(s))   Comprehensive Metabolic Panel    Collection Time: 09/08/21  1:16 PM    Specimen: Blood   Result Value Ref Range    Glucose 101 (H) 65 - 99 mg/dL    BUN 15 8 - 23 mg/dL    Creatinine 0.71 0.57 - 1.00 mg/dL    Sodium 133 (L) 136 - 145 mmol/L    Potassium 4.0 3.5 - 5.2 mmol/L    Chloride 96 (L) 98 - 107 mmol/L    CO2 26.9 22.0 - 29.0 mmol/L    Calcium 9.1 8.6 - 10.5 mg/dL    Total Protein 7.1 6.0 - 8.5 g/dL    Albumin 4.00 3.50 - 5.20 g/dL    ALT (SGPT) 17 1 - 33 U/L    AST (SGOT) 18 1 - 32 U/L    Alkaline Phosphatase 84 39 - 117 U/L    Total Bilirubin 0.5 0.0 - 1.2 mg/dL    eGFR Non African Amer 78 >60 mL/min/1.73    Globulin 3.1 gm/dL    A/G Ratio 1.3 g/dL    BUN/Creatinine Ratio 21.1 7.0 - 25.0    Anion Gap 10.1 5.0 - 15.0 mmol/L   Lipase    Collection Time: 09/08/21  1:16 PM    Specimen: Blood   Result Value Ref Range    Lipase 28 13 - 60 U/L   Green Top (Gel)    Collection Time: 09/08/21  1:16 PM   Result Value Ref Range    Extra Tube Hold for add-ons.    Lavender Top    Collection Time: 09/08/21  1:16 PM   Result Value Ref Range    Extra Tube hold for add-on    Gold Top - SST    Collection Time: 09/08/21  1:16 PM   Result Value Ref Range    Extra Tube Hold for add-ons.    Light Blue Top    Collection Time: 09/08/21  1:16 PM   Result Value Ref Range    Extra Tube hold for add-on    CBC Auto Differential    Collection Time: 09/08/21  1:16 PM    Specimen: Blood   Result Value Ref Range    WBC 8.20 3.40 - 10.80 10*3/mm3    RBC 4.12 3.77 - 5.28 10*6/mm3    Hemoglobin 12.1 12.0 - 15.9 g/dL    Hematocrit 36.5 34.0 - 46.6 %    MCV 88.6 79.0 - 97.0 fL    MCH 29.4 26.6 - 33.0 pg    MCHC 33.2 31.5 - 35.7 g/dL    RDW 13.5 12.3 - 15.4 %    RDW-SD 43.5 37.0 - 54.0 fl    MPV 8.9 6.0 - 12.0 fL    Platelets 227 140 - 450 10*3/mm3    Neutrophil % 75.0  42.7 - 76.0 %    Lymphocyte % 14.1 (L) 19.6 - 45.3 %    Monocyte % 10.2 5.0 - 12.0 %    Eosinophil % 0.1 (L) 0.3 - 6.2 %    Basophil % 0.2 0.0 - 1.5 %    Immature Grans % 0.4 0.0 - 0.5 %    Neutrophils, Absolute 6.14 1.70 - 7.00 10*3/mm3    Lymphocytes, Absolute 1.16 0.70 - 3.10 10*3/mm3    Monocytes, Absolute 0.84 0.10 - 0.90 10*3/mm3    Eosinophils, Absolute 0.01 0.00 - 0.40 10*3/mm3    Basophils, Absolute 0.02 0.00 - 0.20 10*3/mm3    Immature Grans, Absolute 0.03 0.00 - 0.05 10*3/mm3    nRBC 0.0 0.0 - 0.2 /100 WBC   Urinalysis With Microscopic If Indicated (No Culture) - Urine, Catheter    Collection Time: 09/08/21  5:20 PM    Specimen: Urine, Catheter   Result Value Ref Range    Color, UA Yellow Yellow, Straw    Appearance, UA Clear Clear    pH, UA 7.0 5.0 - 8.0    Specific Gravity, UA 1.022 1.005 - 1.030    Glucose, UA Negative Negative    Ketones, UA Negative Negative    Bilirubin, UA Negative Negative    Blood, UA Negative Negative    Protein, UA Negative Negative    Leuk Esterase, UA Small (1+) (A) Negative    Nitrite, UA Negative Negative    Urobilinogen, UA 1.0 E.U./dL 0.2 - 1.0 E.U./dL   Urinalysis, Microscopic Only - Urine, Catheter    Collection Time: 09/08/21  5:20 PM    Specimen: Urine, Catheter   Result Value Ref Range    RBC, UA 0-2 None Seen, 0-2 /HPF    WBC, UA 3-5 (A) None Seen, 0-2 /HPF    Bacteria, UA None Seen None Seen /HPF    Squamous Epithelial Cells, UA 0-2 None Seen, 0-2 /HPF    Hyaline Casts, UA None Seen None Seen /LPF    Methodology Automated Microscopy        Ordered the above labs and independently reviewed the results.        RADIOLOGY  CT Abdomen Pelvis Without Contrast    Result Date: 9/8/2021  CT ABDOMEN PELVIS WO CONTRAST-  INDICATIONS: Left lower quadrant pain  TECHNIQUE: Radiation dose reduction techniques were utilized, including automated exposure control and exposure modulation based on body size. Unenhanced ABDOMEN AND PELVIS CT  COMPARISON: 01/19/2019  FINDINGS:  A left  upper pole renal cyst is seen. Assessment of and for urinary lesions is significantly limited without intravenous contrast material. No urolithiasis or hydronephrosis.  Otherwise unremarkable unenhanced appearance of the liver, spleen, adrenal glands, pancreas, kidneys, bladder.  No bowel obstruction or abnormal bowel thickening is identified. Colonic diverticula are seen that do not appear inflamed. Surgical change of the sigmoid colon is apparent. Minimal hiatal hernia is seen.  No free intraperitoneal gas or free fluid.  Scattered small mesenteric and para-aortic lymph nodes are seen that are not significant by size criteria.  Abdominal aorta is not aneurysmal. Aortic and other arterial calcifications are present.  The lung bases show small atelectasis.  Degenerative changes are seen in the spine. No acute fracture is identified.          1. Colonic diverticulosis. No acute inflammatory process of bowel is identified. Follow up as indications persist. 2. No urolithiasis or hydronephrosis. Left renal cyst.  This report was finalized on 9/8/2021 7:03 PM by Dr. Camacho Headley M.D.        I ordered the above noted radiological studies. Reviewed by me.  See dictation for official radiology interpretation.      PROCEDURES    Procedures      MEDICATIONS GIVEN IN ER    Medications   sodium chloride 0.9 % flush 10 mL (10 mL Intravenous Given 9/8/21 1940)   morphine injection 2 mg (2 mg Intravenous Given 9/8/21 1730)   lactated ringers bolus 1,000 mL (0 mL Intravenous Stopped 9/8/21 1829)   ondansetron (ZOFRAN) injection 4 mg (4 mg Intravenous Given 9/8/21 1729)   morphine injection 4 mg (4 mg Intravenous Given 9/8/21 1829)   ondansetron (ZOFRAN) injection 4 mg (4 mg Intravenous Given 9/8/21 1940)         PROGRESS, DATA ANALYSIS, CONSULTS, AND MEDICAL DECISION MAKING    All labs have been independently reviewed by me.  All radiology studies have been reviewed by me and discussed with radiologist dictating the report.    EKG's independently viewed and interpreted by me.  Discussion below represents my analysis of pertinent findings related to patient's condition, differential diagnosis, treatment plan and final disposition.    Initial concern for possible diverticulitis, bowel obstruction, UTI, kidney stone, musculoskeletal strain secondary to compensation from chronic left knee pain, among others.  No emergent concern for septic joint based on history and exam at this time.    ED Course as of Sep 08 2028   Wed Sep 08, 2021   1623 WBC: 8.20 [DC]   1624 Hemoglobin: 12.1 [DC]   1624 Creatinine: 0.71 [DC]   1624 Lipase: 28 [DC]   1946 Patient updated on the reassuring CT scan with no evidence of any acute bowel obstruction, diverticulitis, kidney stone, and no definite evidence of UTI.  I do think that the patient's abdominal pain more of a radiation from the left hip which is compensating for her left knee issues.  I discussed with her other treatment options at home including ice, rest, elevation, compression.  In addition I also discussed Voltaren pain gel.  She has been on a pain gel in the past with significant relief.  Not currently on it as she does not have any at that time.  I will prescribe a short course of Norco for severe pain as needed.  She has an appoint with orthopedics this coming Friday.  Do not have any acute emergent suspicion at this time for septic joint.  ED return for worsening symptoms as needed.    [DC]      ED Course User Index  [DC] Rich Dawson MD       AS OF 20:28 EDT VITALS:    BP - 127/82  HR - 65  TEMP - 96.8 °F (36 °C)  02 SATS - 92%        DIAGNOSIS  Final diagnoses:   Chronic pain of left knee   Left hip pain   Osteoarthritis of left knee, unspecified osteoarthritis type         DISPOSITION  DISCHARGE    Patient discharged in stable condition.    Reviewed implications of results, diagnosis, meds, responsibility to follow up, warning signs and symptoms of possible worsening, potential  complications and reasons to return to ER.    Patient/Family voiced understanding of above instructions.    Discussed plan for discharge, as there is no emergent indication for admission. Patient referred to primary care provider for BP management due to today's BP. Pt/family is agreeable and understands need for follow up and repeat testing.  Pt is aware that discharge does not mean that nothing is wrong but it indicates no emergency is present that requires admission and they must continue care with follow-up as given below or physician of their choice.     FOLLOW-UP  [facility] Emergency Department  [address] [id number] [phone]    As needed, If symptoms worsen    Demond Forrest MD  [address]  [facility] [id number] [phone]    Go on 9/10/2021  As scheduled         Medication List      New Prescriptions    Diclofenac Sodium 1 % gel gel  Commonly known as: VOLTAREN  Apply 4 g topically to the appropriate area as directed 4 (Four) Times a Day As Needed (knee pain).     HYDROcodone-acetaminophen 5-325 MG per tablet  Commonly known as: NORCO  Take 1 tablet by mouth Every 6 (Six) Hours As Needed for Severe Pain . Do not drive or mix with alcohol           Where to Get Your Medications      These medications were sent to [facility]/pharmacy #[id number] - [place], [place] - [address] - [phone]  - [phone] FX  [address] [address]    Phone: [phone]   · Diclofenac Sodium 1 % gel gel  · HYDROcodone-acetaminophen 5-325 MG per tablet                    Rich Dawson MD  09/08/21 2029

## 2021-09-08 NOTE — DISCHARGE INSTRUCTIONS
Take medications as prescribed, ice for pain or swelling, compression wraps for additional support, limit weightbearing to ease burden on the knee.  Follow-up with orthopedics on Friday as scheduled, ED return for worsening symptoms as needed.

## 2021-09-08 NOTE — ED NOTES
Patient from home with c/o left knee pain and LLQ abdominal pain that started last night. States knee pain is chronic. Denies any n/v/d.     Leah Oliveira RN  09/08/21 [id number]

## 2021-09-09 ENCOUNTER — APPOINTMENT (OUTPATIENT)
Dept: PREADMISSION TESTING | Facility: HOSPITAL | Age: 86
End: 2021-09-09

## 2021-09-10 ENCOUNTER — OFFICE VISIT (OUTPATIENT)
Dept: ORTHOPEDIC SURGERY | Facility: CLINIC | Age: 86
End: 2021-09-10

## 2021-09-10 VITALS — WEIGHT: 196 LBS | TEMPERATURE: 88.5 F | BODY MASS INDEX: 36.07 KG/M2 | HEIGHT: 62 IN

## 2021-09-10 DIAGNOSIS — M17.12 PRIMARY OSTEOARTHRITIS OF LEFT KNEE: ICD-10-CM

## 2021-09-10 DIAGNOSIS — R52 PAIN: Primary | ICD-10-CM

## 2021-09-10 PROCEDURE — 73562 X-RAY EXAM OF KNEE 3: CPT | Performed by: NURSE PRACTITIONER

## 2021-09-10 PROCEDURE — 99213 OFFICE O/P EST LOW 20 MIN: CPT | Performed by: NURSE PRACTITIONER

## 2021-09-10 RX ORDER — TRAMADOL HYDROCHLORIDE 50 MG/1
TABLET ORAL
Qty: 30 TABLET | Refills: 0 | Status: SHIPPED | OUTPATIENT
Start: 2021-09-10 | End: 2021-09-23

## 2021-09-10 NOTE — PROGRESS NOTES
Patient: Ghada Ryan  YOB: 1935 85 y.o. female  Medical Record Number: [id number]    Chief Complaints:   Chief Complaint   Patient presents with   • Left Knee - Follow-up       History of Present Illness:Ghada Ryan is a 85 y.o. female who presents for follow-up of left knee pain that is chronic in nature.  Unfortunately we had to cancel the patient's knee replacement surgery due to Covid restrictions at the hospital.  Patient states her pain was so severe the other day she went to the emergency department.  Emergency department tell the patient that she should follow back up with us in clinic today.  Patient reports no new complaints and is here for reevaluation.    Allergies:   Allergies   Allergen Reactions   • Contrast Dye Hives       Medications:   Current Outpatient Medications   Medication Sig Dispense Refill   • Acetaminophen (TYLENOL ARTHRITIS PAIN PO) Take  by mouth As Needed.     • albuterol (PROVENTIL HFA;VENTOLIN HFA) 108 (90 BASE) MCG/ACT inhaler Inhale 2 puffs Every 6 (Six) Hours As Needed.     • aspirin 81 MG tablet Take 81 mg by mouth.     • Diclofenac Sodium (VOLTAREN) 1 % gel gel Apply 4 g topically to the appropriate area as directed 4 (Four) Times a Day As Needed (knee pain). 150 g 0   • Fluticasone Furoate-Vilanterol (BREO ELLIPTA) 100-25 MCG/INH aerosol powder  Inhale 1 puff Daily.     • HYDROcodone-acetaminophen (NORCO) 5-325 MG per tablet Take 1 tablet by mouth Every 6 (Six) Hours As Needed for Severe Pain . Do not drive or mix with alcohol 12 tablet 0   • levothyroxine (SYNTHROID, LEVOTHROID) 25 MCG tablet TAKE 1 TABLET BY MOUTH EVERY DAY 30 tablet 1   • losartan (COZAAR) 100 MG tablet TAKE 1 TABLET BY MOUTH EVERY DAY 90 tablet 3   • omeprazole (priLOSEC) 20 MG capsule TAKE 1 CAPSULE BY MOUTH EVERY DAY 90 capsule 3   • polyethylene glycol (MIRALAX) powder TAKE 17 G BY MOUTH DAILY. 255 g 0   • sertraline (ZOLOFT) 50 MG tablet TAKE 1 TABLET BY MOUTH  "EVERY DAY 90 tablet 3   • simvastatin (ZOCOR) 40 MG tablet TAKE 1 TABLET BY MOUTH EVERY DAY AT NIGHT 90 tablet 1   • timolol (TIMOPTIC) 0.5 % ophthalmic solution INSTILL 1 DROP INTO BOTH EYES TWICE A DAY     • traMADol (ULTRAM) 50 MG tablet Take 1-2 tabs po q6hrs prn pain. Take 2 only for severe pain. 30 tablet 0     No current facility-administered medications for this visit.         The following portions of the patient's history were reviewed and updated as appropriate: allergies, current medications, past family history, past medical history, past social history, past surgical history and problem list.    Review of Systems:   A 14 point review of systems was performed. All systems negative except pertinent positives/negative listed in HPI above    Physical Exam:   Vitals:    09/10/21 0856   Temp: (!) 88.5 °F (31.4 °C)  Comment: taken on the wrist   Weight: 88.9 kg (196 lb)   Height: 157.5 cm (62\")       General: A and O x 3, ASA, NAD    SCLERA:    Normal    DENTITION:   Normal    Knee:  right    ALIGNMENT:     Varus  ,   Patella  tracks  midline    GAIT:    Antalgic    SKIN:    No abnormality    RANGE OF MOTION:   5  -  125   DEG    STRENGTH:   4  / 5    LIGAMENTS:    No varus / valgus instability.   Negative  Lachman.    MENISCUS:     Negative   Beena       DISTAL PULSES:    Paplable    DISTAL SENSATION :   Intact    LYMPHATICS:     No   lymphadenopathy    OTHER:          - Positive   effusion      + Crepitance with ROM         Radiology:  Xrays 3views (ap,lateral, sunrise) were ordered and reviewed for evaluation of knee pain demonstratingadvanced varus osteoarthritis with bone on bone articulation, subchondral cysts, and periarticular osteophytes. todays xrays were compared to previous xrays and demonstrate progression of the disease process.    Assessment/Plan: Primary osteoarthritis of left knee  Treatment options as well as imaging results were discussed in detail with the patient.  We instructed the " patient that we are going to reschedule her surgery as soon as the hospital lead to start doing overnight/inpatient surgeries again.  In the meantime we will give the patient a prescription for tramadol to assist with pain.  I am also can give her a prescription for some anti-inflammatory gel for arthritic pain.  I told her that we would give her added back as soon as possible and should she need anything in the meantime to give us a call.  Patient voices understanding satisfaction with her plan today.    SHAVONNE Pepper  09/10/2021

## 2021-09-17 DIAGNOSIS — E03.9 HYPOTHYROIDISM, UNSPECIFIED TYPE: ICD-10-CM

## 2021-09-17 RX ORDER — LEVOTHYROXINE SODIUM 0.03 MG/1
25 TABLET ORAL DAILY
Qty: 30 TABLET | Refills: 1 | Status: SHIPPED | OUTPATIENT
Start: 2021-09-17 | End: 2021-12-13

## 2021-09-17 NOTE — TELEPHONE ENCOUNTER
Caller: Ghada Ryan    Relationship: Self    Best call back number: [phone]     Medication needed:   Requested Prescriptions     Pending Prescriptions Disp Refills   • levothyroxine (SYNTHROID, LEVOTHROID) 25 MCG tablet 30 tablet 1     Sig: Take 1 tablet by mouth Daily.       When do you need the refill by: WITHIN THE NEXT TWO DAYS    What additional details did the patient provide when requesting the medication: PATIENT HAS TWO PILLS    Does the patient have less than a 3 day supply:  [x] Yes  [] No    What is the patient's preferred pharmacy: [id number] - [place], [place] - [address] AT [facility] - [phone] [facility] [phone] FX

## 2021-09-20 RX ORDER — OMEPRAZOLE 20 MG/1
CAPSULE, DELAYED RELEASE ORAL
Qty: 90 CAPSULE | Refills: 3 | Status: SHIPPED | OUTPATIENT
Start: 2021-09-20

## 2021-09-22 ENCOUNTER — TELEPHONE (OUTPATIENT)
Dept: ORTHOPEDIC SURGERY | Facility: CLINIC | Age: 86
End: 2021-09-22

## 2021-09-22 NOTE — TELEPHONE ENCOUNTER
Caller: BRITTANY BOLTON    Relationship: SELF    Best call back number: [phone]- RECEPTION DESK AND ASK FOR PATIENT    What is the best time to reach you: ANY    Who are you requesting to speak with (clinical staff, provider,  specific staff member): TRICE MCKEON    What was the call regarding: PATIENTS SURGERY WAS CANCELED AND IS IN SO MUCH PAIN AND WANTS TO DISCUSS WHAT TO DO FROM HERE    Do you require a callback: YES

## 2021-09-23 ENCOUNTER — OFFICE VISIT (OUTPATIENT)
Dept: FAMILY MEDICINE CLINIC | Facility: CLINIC | Age: 86
End: 2021-09-23

## 2021-09-23 VITALS
DIASTOLIC BLOOD PRESSURE: 88 MMHG | BODY MASS INDEX: 33.48 KG/M2 | SYSTOLIC BLOOD PRESSURE: 140 MMHG | WEIGHT: 196.1 LBS | HEIGHT: 64 IN | HEART RATE: 69 BPM | RESPIRATION RATE: 16 BRPM | OXYGEN SATURATION: 98 %

## 2021-09-23 DIAGNOSIS — R52 PAIN: ICD-10-CM

## 2021-09-23 DIAGNOSIS — M17.12 ARTHRITIS OF LEFT KNEE: Primary | ICD-10-CM

## 2021-09-23 PROCEDURE — 99213 OFFICE O/P EST LOW 20 MIN: CPT | Performed by: NURSE PRACTITIONER

## 2021-09-23 RX ORDER — TRAMADOL HYDROCHLORIDE 50 MG/1
50 TABLET ORAL EVERY 6 HOURS PRN
Qty: 40 TABLET | Refills: 0 | Status: SHIPPED | OUTPATIENT
Start: 2021-09-23 | End: 2021-10-03

## 2021-09-23 NOTE — TELEPHONE ENCOUNTER
Please notify patient that we are awaiting the final decision from OR committee and will contact her as soon as we have approval to schedule -  will likely be contacting her within a week

## 2021-09-23 NOTE — PROGRESS NOTES
Subjective   Ghada Ryan is a 85 y.o. female.     History of Present Illness   Patient presents for c/o left knee pain, ongoing and chronic.  Her knee replacement surgery was cancelled due to Covid restrictions. She was started on tramadol and diclofenac.  She reports that her pain is terrible and is a 10/10.  Patient reports that she was given hydrocodone and this did help.     The following portions of the patient's history were reviewed and updated as appropriate: allergies, current medications, past family history, past medical history, past social history, past surgical history and problem list.    Review of Systems   Constitutional: Negative for chills, fatigue and fever.   Respiratory: Negative for cough and shortness of breath.    Cardiovascular: Negative for chest pain, palpitations and leg swelling.   Musculoskeletal: Positive for arthralgias. Negative for back pain, gait problem, joint swelling, myalgias, neck pain and neck stiffness.   Skin: Negative for dry skin.   Neurological: Negative for dizziness, weakness and headache.   Psychiatric/Behavioral: Negative.        Objective   Physical Exam  Vitals and nursing note reviewed.   Constitutional:       Appearance: She is well-developed.   HENT:      Head: Normocephalic and atraumatic.   Eyes:      Conjunctiva/sclera: Conjunctivae normal.      Pupils: Pupils are equal, round, and reactive to light.   Neck:      Thyroid: No thyromegaly.   Cardiovascular:      Rate and Rhythm: Normal rate and regular rhythm.      Heart sounds: Normal heart sounds. No murmur heard.     Pulmonary:      Effort: Pulmonary effort is normal.      Breath sounds: Normal breath sounds.   Musculoskeletal:         General: Swelling present. No deformity.      Cervical back: Full passive range of motion without pain, normal range of motion and neck supple.      Thoracic back: Normal.      Lumbar back: Normal.      Right knee: Normal.      Left knee: Effusion present. No  deformity, erythema, ecchymosis or lacerations. Decreased range of motion.      Right lower leg: No edema.      Left lower leg: No edema.   Lymphadenopathy:      Cervical: No cervical adenopathy.   Skin:     General: Skin is warm and dry.   Neurological:      Mental Status: She is alert and oriented to person, place, and time.   Psychiatric:         Behavior: Behavior normal.         Thought Content: Thought content normal.         Judgment: Judgment normal.         Vitals:    09/23/21 1142   BP: 140/88   Pulse: 69   Resp: 16   SpO2: 98%     Body mass index is 33.66 kg/m².    Procedures    Assessment/Plan   Problems Addressed this Visit     None      Visit Diagnoses     Arthritis of left knee    -  Primary    Relevant Medications    traMADol (ULTRAM) 50 MG tablet    Diclofenac Sodium (VOLTAREN) 1 % gel gel    Pain        Relevant Medications    traMADol (ULTRAM) 50 MG tablet    Diclofenac Sodium (VOLTAREN) 1 % gel gel      Diagnoses       Codes Comments    Arthritis of left knee    -  Primary ICD-10-CM: M17.12  ICD-9-CM: 716.96     Pain     ICD-10-CM: R52  ICD-9-CM: 780.96         Tramadol 50 mg 1 p.o. Q6H PRN  Diclofenac 1% gel as directed  Follow-up with Dr. Forrest as scheduled.          Return if symptoms worsen or fail to improve.     Patient Instructions   Return if symptoms worsen or fail to improve.  Call with any questions or concerns.

## 2021-09-24 NOTE — TELEPHONE ENCOUNTER
Patient informed of RBB Prior message verbalizing understanding and with no further questions or concerns.

## 2021-09-29 ENCOUNTER — TRANSCRIBE ORDERS (OUTPATIENT)
Dept: PREADMISSION TESTING | Facility: HOSPITAL | Age: 86
End: 2021-09-29

## 2021-10-05 ENCOUNTER — PRE-ADMISSION TESTING (OUTPATIENT)
Dept: PREADMISSION TESTING | Facility: HOSPITAL | Age: 86
End: 2021-10-05

## 2021-10-05 VITALS
WEIGHT: 196.2 LBS | SYSTOLIC BLOOD PRESSURE: 160 MMHG | BODY MASS INDEX: 45.4 KG/M2 | HEART RATE: 60 BPM | HEIGHT: 55 IN | RESPIRATION RATE: 18 BRPM | OXYGEN SATURATION: 96 % | DIASTOLIC BLOOD PRESSURE: 91 MMHG | TEMPERATURE: 97.9 F

## 2021-10-05 DIAGNOSIS — M17.12 PRIMARY OSTEOARTHRITIS OF LEFT KNEE: ICD-10-CM

## 2021-10-05 LAB
ANION GAP SERPL CALCULATED.3IONS-SCNC: 7.8 MMOL/L (ref 5–15)
BACTERIA UR QL AUTO: NORMAL /HPF
BILIRUB UR QL STRIP: NEGATIVE
BUN SERPL-MCNC: 14 MG/DL (ref 8–23)
BUN/CREAT SERPL: 19.2 (ref 7–25)
CALCIUM SPEC-SCNC: 8.9 MG/DL (ref 8.6–10.5)
CHLORIDE SERPL-SCNC: 100 MMOL/L (ref 98–107)
CLARITY UR: CLEAR
CO2 SERPL-SCNC: 27.2 MMOL/L (ref 22–29)
COLOR UR: YELLOW
CREAT SERPL-MCNC: 0.73 MG/DL (ref 0.57–1)
DEPRECATED RDW RBC AUTO: 45.2 FL (ref 37–54)
ERYTHROCYTE [DISTWIDTH] IN BLOOD BY AUTOMATED COUNT: 13.8 % (ref 12.3–15.4)
GFR SERPL CREATININE-BSD FRML MDRD: 76 ML/MIN/1.73
GLUCOSE SERPL-MCNC: 84 MG/DL (ref 65–99)
GLUCOSE UR STRIP-MCNC: NEGATIVE MG/DL
HCT VFR BLD AUTO: 34.9 % (ref 34–46.6)
HGB BLD-MCNC: 11.4 G/DL (ref 12–15.9)
HGB UR QL STRIP.AUTO: NEGATIVE
HYALINE CASTS UR QL AUTO: NORMAL /LPF
KETONES UR QL STRIP: NEGATIVE
LEUKOCYTE ESTERASE UR QL STRIP.AUTO: ABNORMAL
MCH RBC QN AUTO: 29.2 PG (ref 26.6–33)
MCHC RBC AUTO-ENTMCNC: 32.7 G/DL (ref 31.5–35.7)
MCV RBC AUTO: 89.5 FL (ref 79–97)
NITRITE UR QL STRIP: NEGATIVE
PH UR STRIP.AUTO: 8 [PH] (ref 5–8)
PLATELET # BLD AUTO: 259 10*3/MM3 (ref 140–450)
PMV BLD AUTO: 8.8 FL (ref 6–12)
POTASSIUM SERPL-SCNC: 4 MMOL/L (ref 3.5–5.2)
PROT UR QL STRIP: NEGATIVE
QT INTERVAL: 428 MS
RBC # BLD AUTO: 3.9 10*6/MM3 (ref 3.77–5.28)
RBC # UR: NORMAL /HPF
REF LAB TEST METHOD: NORMAL
SODIUM SERPL-SCNC: 135 MMOL/L (ref 136–145)
SP GR UR STRIP: 1.01 (ref 1–1.03)
SQUAMOUS #/AREA URNS HPF: NORMAL /HPF
UROBILINOGEN UR QL STRIP: ABNORMAL
WBC # BLD AUTO: 5.25 10*3/MM3 (ref 3.4–10.8)
WBC UR QL AUTO: NORMAL /HPF

## 2021-10-05 PROCEDURE — 80048 BASIC METABOLIC PNL TOTAL CA: CPT

## 2021-10-05 PROCEDURE — 63710000001 MUPIROCIN 2 % OINTMENT: Performed by: ORTHOPAEDIC SURGERY

## 2021-10-05 PROCEDURE — 93005 ELECTROCARDIOGRAM TRACING: CPT

## 2021-10-05 PROCEDURE — 36415 COLL VENOUS BLD VENIPUNCTURE: CPT

## 2021-10-05 PROCEDURE — 85027 COMPLETE CBC AUTOMATED: CPT

## 2021-10-05 PROCEDURE — A9270 NON-COVERED ITEM OR SERVICE: HCPCS | Performed by: ORTHOPAEDIC SURGERY

## 2021-10-05 PROCEDURE — 81001 URINALYSIS AUTO W/SCOPE: CPT

## 2021-10-05 PROCEDURE — 93010 ELECTROCARDIOGRAM REPORT: CPT | Performed by: INTERNAL MEDICINE

## 2021-10-05 RX ORDER — IBUPROFEN 600 MG/1
600 TABLET ORAL EVERY 6 HOURS PRN
COMMUNITY

## 2021-10-05 RX ORDER — CHLORHEXIDINE GLUCONATE 500 MG/1
CLOTH TOPICAL 2 TIMES DAILY
Status: ACTIVE | OUTPATIENT
Start: 2021-10-05

## 2021-10-05 RX ORDER — TRAZODONE HYDROCHLORIDE 50 MG/1
50 TABLET ORAL 4 TIMES DAILY PRN
COMMUNITY

## 2021-10-05 ASSESSMENT — KOOS JR
KOOS JR SCORE: 7
KOOS JR SCORE: 68.284

## 2021-10-05 NOTE — DISCHARGE INSTRUCTIONS
Arrive to hospital on your day of surgery at (WILL CALL DAY BEFORE WITH ARRIVAL TIME)    Take the following medications the morning of surgery: INHALER, LEVOTHYROXINE,  OMEPRAZOLE, AND TRAZADONE IF NEEDED    BRING CPAP DAY OF SURGERY      If you are on prescription narcotic pain medication to control your pain you may also take that medication the morning of surgery.    General Instructions:  • Do not eat solid food after midnight the night before surgery.  • You may drink clear liquids day of surgery but must stop at least one hour before your hospital arrival time.  • It is beneficial for you to have a clear drink that contains carbohydrates the day of surgery.  We suggest a 12 to 20 ounce bottle of Gatorade or Powerade for non-diabetic patients or a 12 to 20 ounce bottle of G2 or Powerade Zero for diabetic patients. (Pediatric patients, are not advised to drink a 12 to 20 ounce carbohydrate drink)    Clear liquids are liquids you can see through.  Nothing red in color.     Plain water                               Sports drinks  Sodas                                   Gelatin (Jell-O)  Fruit juices without pulp such as white grape juice and apple juice  Popsicles that contain no fruit or yogurt  Tea or coffee (no cream or milk added)  Gatorade / Powerade  G2 / Powerade Zero    • Infants may have breast milk up to four hours before surgery.  • Infants drinking formula may drink formula up to six hours before surgery.   • Patients who avoid smoking, chewing tobacco and alcohol for 4 weeks prior to surgery have a reduced risk of post-operative complications.  Quit smoking as many days before surgery as you can.  • Do not smoke, use chewing tobacco or drink alcohol the day of surgery.   • If applicable bring your C-PAP/ BI-PAP machine.  • Bring any papers given to you in the doctor’s office.  • Wear clean comfortable clothes.  • Do not wear contact lenses, false eyelashes or make-up.  Bring a case for your glasses.    • Bring crutches or walker if applicable.  • Remove all piercings.  Leave jewelry and any other valuables at home.  • Hair extensions with metal clips must be removed prior to surgery.  • The Pre-Admission Testing nurse will instruct you to bring medications if unable to obtain an accurate list in Pre-Admission Testing.        If you were given a blood bank ID arm band remember to bring it with you the day of surgery.    Preventing a Surgical Site Infection:  • For 2 to 3 days before surgery, avoid shaving with a razor because the razor can irritate skin and make it easier to develop an infection.    • Any areas of open skin can increase the risk of a post-operative wound infection by allowing bacteria to enter and travel throughout the body.  Notify your surgeon if you have any skin wounds / rashes even if it is not near the expected surgical site.  The area will need assessed to determine if surgery should be delayed until it is healed.  • The night prior to surgery shower using a fresh bar of anti-bacterial soap (such as Dial) and clean washcloth.  Sleep in a clean bed with clean clothing.  Do not allow pets to sleep with you.  • Shower on the morning of surgery using a fresh bar of anti-bacterial soap (such as Dial) and clean washcloth.  Dry with a clean towel and dress in clean clothing.  • Ask your surgeon if you will be receiving antibiotics prior to surgery.  • Make sure you, your family, and all healthcare providers clean their hands with soap and water or an alcohol based hand  before caring for you or your wound.    Day of surgery:  Your arrival time is approximately two hours before your scheduled surgery time.  Upon arrival, a Pre-op nurse and Anesthesiologist will review your health history, obtain vital signs, and answer questions you may have.  The only belongings needed at this time will be a list of your home medications and if applicable your C-PAP/BI-PAP machine.  A Pre-op nurse will  start an IV and you may receive medication in preparation for surgery, including something to help you relax.     Please be aware that surgery does come with discomfort.  We want to make every effort to control your discomfort so please discuss any uncontrolled symptoms with your nurse.   Your doctor will most likely have prescribed pain medications.      If you are going home after surgery you will receive individualized written care instructions before being discharged.  A responsible adult must drive you to and from the hospital on the day of your surgery and stay with you for 24 hours.  Discharge prescriptions can be filled by the hospital pharmacy during regular pharmacy hours.  If you are having surgery late in the day/evening your prescription may be e-prescribed to your pharmacy.  Please verify your pharmacy hours or chose a 24 hour pharmacy to avoid not having access to your prescription because your pharmacy has closed for the day.    If you are staying overnight following surgery, you will be transported to your hospital room following the recovery period.  [facility] has all private rooms.    If you have any questions please call Pre-Admission Testing at [phone].  Deductibles and co-payments are collected on the day of service. Please be prepared to pay the required co-pay, deductible or deposit on the day of service as defined by your plan.    Patient Education for Self-Quarantine Process    • Following your COVID testing, we strongly recommend that you wear a mask when you are with other people and practice social distancing.   • Limit your activities to only required outings.  • Wash your hands with soap and water frequently for at least 20 seconds.   • Avoid touching your eyes, nose and mouth with unwashed hands.  • Do not share anything - utensils, drinking glasses, food from the same bowl.   • Sanitize household surfaces daily. Include all high touch areas (door handles, light  switches, phones, countertops, etc.)    Call your surgeon immediately if you experience any of the following symptoms:  • Sore Throat  • Shortness of Breath or difficulty breathing  • Cough  • Chills  • Body soreness or muscle pain  • Headache  • Fever  • New loss of taste or smell  • Do not arrive for your surgery ill.  Your procedure will need to be rescheduled to another time.  You will need to call your physician before the day of surgery to avoid any unnecessary exposure to hospital staff as well as other patients.    Take the following medications the morning of surgery:      If you are on prescription narcotic pain medication to control your pain you may also take that medication the morning of surgery.    General Instructions:  • Do not eat solid food after midnight the night before surgery.  • You may drink clear liquids day of surgery but must stop at least one hour before your hospital arrival time.  • It is beneficial for you to have a clear drink that contains carbohydrates the day of surgery.  We suggest a 12 to 20 ounce bottle of Gatorade or Powerade for non-diabetic patients or a 12 to 20 ounce bottle of G2 or Powerade Zero for diabetic patients. (Pediatric patients, are not advised to drink a 12 to 20 ounce carbohydrate drink)    Clear liquids are liquids you can see through.  Nothing red in color.     Plain water                               Sports drinks  Sodas                                   Gelatin (Jell-O)  Fruit juices without pulp such as white grape juice and apple juice  Popsicles that contain no fruit or yogurt  Tea or coffee (no cream or milk added)  Gatorade / Powerade  G2 / Powerade Zero    • Infants may have breast milk up to four hours before surgery.  • Infants drinking formula may drink formula up to six hours before surgery.   • Patients who avoid smoking, chewing tobacco and alcohol for 4 weeks prior to surgery have a reduced risk of post-operative complications.  Quit smoking  as many days before surgery as you can.  • Do not smoke, use chewing tobacco or drink alcohol the day of surgery.   • If applicable bring your C-PAP/ BI-PAP machine.  • Bring any papers given to you in the doctor’s office.  • Wear clean comfortable clothes.  • Do not wear contact lenses, false eyelashes or make-up.  Bring a case for your glasses.   • Bring crutches or walker if applicable.  • Remove all piercings.  Leave jewelry and any other valuables at home.  • Hair extensions with metal clips must be removed prior to surgery.  • The Pre-Admission Testing nurse will instruct you to bring medications if unable to obtain an accurate list in Pre-Admission Testing.        If you were given a blood bank ID arm band remember to bring it with you the day of surgery.    Preventing a Surgical Site Infection:  • For 2 to 3 days before surgery, avoid shaving with a razor because the razor can irritate skin and make it easier to develop an infection.    • Any areas of open skin can increase the risk of a post-operative wound infection by allowing bacteria to enter and travel throughout the body.  Notify your surgeon if you have any skin wounds / rashes even if it is not near the expected surgical site.  The area will need assessed to determine if surgery should be delayed until it is healed.  • The night prior to surgery shower using a fresh bar of anti-bacterial soap (such as Dial) and clean washcloth.  Sleep in a clean bed with clean clothing.  Do not allow pets to sleep with you.  • Shower on the morning of surgery using a fresh bar of anti-bacterial soap (such as Dial) and clean washcloth.  Dry with a clean towel and dress in clean clothing.  • Ask your surgeon if you will be receiving antibiotics prior to surgery.  • Make sure you, your family, and all healthcare providers clean their hands with soap and water or an alcohol based hand  before caring for you or your wound.    Day of surgery:  Your arrival time  is approximately two hours before your scheduled surgery time.  Upon arrival, a Pre-op nurse and Anesthesiologist will review your health history, obtain vital signs, and answer questions you may have.  The only belongings needed at this time will be a list of your home medications and if applicable your C-PAP/BI-PAP machine.  A Pre-op nurse will start an IV and you may receive medication in preparation for surgery, including something to help you relax.     Please be aware that surgery does come with discomfort.  We want to make every effort to control your discomfort so please discuss any uncontrolled symptoms with your nurse.   Your doctor will most likely have prescribed pain medications.      If you are going home after surgery you will receive individualized written care instructions before being discharged.  A responsible adult must drive you to and from the hospital on the day of your surgery and stay with you for 24 hours.  Discharge prescriptions can be filled by the hospital pharmacy during regular pharmacy hours.  If you are having surgery late in the day/evening your prescription may be e-prescribed to your pharmacy.  Please verify your pharmacy hours or chose a 24 hour pharmacy to avoid not having access to your prescription because your pharmacy has closed for the day.    If you are staying overnight following surgery, you will be transported to your hospital room following the recovery period.  [facility] has all private rooms.    If you have any questions please call Pre-Admission Testing at [phone].  Deductibles and co-payments are collected on the day of service. Please be prepared to pay the required co-pay, deductible or deposit on the day of service as defined by your plan.    Patient Education for Self-Quarantine Process    • Following your COVID testing, we strongly recommend that you wear a mask when you are with other people and practice social distancing.   • Limit  your activities to only required outings.  • Wash your hands with soap and water frequently for at least 20 seconds.   • Avoid touching your eyes, nose and mouth with unwashed hands.  • Do not share anything - utensils, drinking glasses, food from the same bowl.   • Sanitize household surfaces daily. Include all high touch areas (door handles, light switches, phones, countertops, etc.)    Call your surgeon immediately if you experience any of the following symptoms:  • Sore Throat  • Shortness of Breath or difficulty breathing  • Cough  • Chills  • Body soreness or muscle pain  • Headache  • Fever  • New loss of taste or smell  • Do not arrive for your surgery ill.  Your procedure will need to be rescheduled to another time.  You will need to call your physician before the day of surgery to avoid any unnecessary exposure to hospital staff as well as other patients.      CHLORHEXIDINE CLOTH INSTRUCTIONS  The morning of surgery follow these instructions using the Chlorhexidine cloths you've been given.  These steps reduce bacteria on the body.  Do not use the cloths near your eyes, ears mouth, genitalia or on open wounds.  Throw the cloths away after use but do not try to flush them down a toilet.      • Open and remove one cloth at a time from the package.    • Leave the cloth unfolded and begin the bathing.  • Massage the skin with the cloths using gentle pressure to remove bacteria.  Do not scrub harshly.   • Follow the steps below with one 2% CHG cloth per area (6 total cloths).  • One cloth for neck, shoulders and chest.  • One cloth for both arms, hands, fingers and underarms (do underarms last).  • One cloth for the abdomen followed by groin.  • One cloth for right leg and foot including between the toes.  • One cloth for left leg and foot including between the toes.  • The last cloth is to be used for the back of the neck, back and buttocks.    Allow the CHG to air dry 3 minutes on the skin which will give it  time to work and decrease the chance of irritation.  The skin may feel sticky until it is dry.  Do not rinse with water or any other liquid or you will lose the beneficial effects of the CHG.  If mild skin irritation occurs, do rinse the skin to remove the CHG.  Report this to the nurse at time of admission.  Do not apply lotions, creams, ointments, deodorants or perfumes after using the clothes. Dress in clean clothes before coming to the hospital.    BACTROBAN NASAL OINTMENT  There are many germs normally in your nose. Bactroban is an ointment that will help reduce these germs. Please follow these instructions for Bactroban use:      ____The day before surgery in the morning  Date________    ____The day before surgery in the evening              Date________    ____The day of surgery in the morning    Date________    **Squirt ½ package of Bactroban Ointment onto a cotton applicator and apply to inside of 1st nostril.  Squirt the remaining Bactroban and apply to the inside of the other nostril.

## 2021-10-15 ENCOUNTER — TELEPHONE (OUTPATIENT)
Dept: ORTHOPEDIC SURGERY | Facility: CLINIC | Age: 86
End: 2021-10-15

## 2021-10-15 NOTE — TELEPHONE ENCOUNTER
Provider: TRICE SINGLETARY  Caller: BRITTANY BOLTON  Relationship to Patient: SELF  Pharmacy:   Phone Number: [phone]    Reason for Call: PATIENT REQ A CALL FROM TRICE SINGLETARY-WANTED TO CONFIRM THAT DR CONKLIN WAS AWARE THAT HER SURGERY WAS CANCELLED FOR TODAY DUE TO PATIENT HAVING A FALL AND INJURIES

## 2021-10-19 ENCOUNTER — OFFICE VISIT (OUTPATIENT)
Dept: FAMILY MEDICINE CLINIC | Facility: CLINIC | Age: 86
End: 2021-10-19

## 2021-10-19 VITALS
BODY MASS INDEX: 45.13 KG/M2 | RESPIRATION RATE: 16 BRPM | HEIGHT: 55 IN | WEIGHT: 195 LBS | SYSTOLIC BLOOD PRESSURE: 150 MMHG | DIASTOLIC BLOOD PRESSURE: 90 MMHG | OXYGEN SATURATION: 98 % | HEART RATE: 58 BPM

## 2021-10-19 DIAGNOSIS — R52 PAIN: ICD-10-CM

## 2021-10-19 DIAGNOSIS — M17.12 ARTHRITIS OF LEFT KNEE: ICD-10-CM

## 2021-10-19 DIAGNOSIS — Z91.81 HISTORY OF RECENT FALL: Primary | ICD-10-CM

## 2021-10-19 DIAGNOSIS — S02.2XXD CLOSED FRACTURE OF NASAL BONE WITH ROUTINE HEALING, SUBSEQUENT ENCOUNTER: ICD-10-CM

## 2021-10-19 PROCEDURE — 99214 OFFICE O/P EST MOD 30 MIN: CPT | Performed by: NURSE PRACTITIONER

## 2021-10-19 RX ORDER — CHLORHEXIDINE GLUCONATE 0.12 MG/ML
15 RINSE ORAL 2 TIMES DAILY
Qty: 60 ML | Refills: 1 | Status: SHIPPED | OUTPATIENT
Start: 2021-10-19

## 2021-10-19 RX ORDER — TRAMADOL HYDROCHLORIDE 50 MG/1
50 TABLET ORAL 2 TIMES DAILY PRN
Qty: 60 TABLET | Refills: 0 | Status: SHIPPED | OUTPATIENT
Start: 2021-10-19 | End: 2021-11-02 | Stop reason: SDUPTHER

## 2021-10-19 NOTE — PROGRESS NOTES
Subjective   Ghada Ryan is a 85 y.o. female.     History of Present Illness   Patient presents for follow-up for recent fall. She reports that this happened about 13 days ago. She was seen at [facility]. She reports that her CT was normal other than a broken nose. She did break her front right tooth, but reports that she is doing okay. Patient did have to cancel her upcoming knee surgery due to fall. Patient has seen an ENT for her broken nose. She is taking tramadol daily for her knee pain, which she states helps.     The following portions of the patient's history were reviewed and updated as appropriate: allergies, current medications, past family history, past medical history, past social history, past surgical history and problem list.    Review of Systems   Constitutional: Negative for chills, fatigue and fever.   HENT: Positive for dental problem (Broken front teeth).    Eyes: Negative for blurred vision, double vision and visual disturbance.   Respiratory: Negative for cough, chest tightness, shortness of breath and wheezing.    Cardiovascular: Negative for chest pain, palpitations and leg swelling.   Endocrine: Negative for heat intolerance.   Genitourinary: Negative for flank pain, frequency and urgency.   Musculoskeletal: Positive for arthralgias (left knee pain).   Neurological: Positive for headache. Negative for dizziness, tremors, syncope, weakness, memory problem and confusion.   Psychiatric/Behavioral: Negative for agitation, decreased concentration, hallucinations, sleep disturbance, suicidal ideas and depressed mood. The patient is not nervous/anxious.        Objective   Physical Exam  Vitals and nursing note reviewed.   Constitutional:       Appearance: Normal appearance. She is well-developed.   HENT:      Head: Normocephalic and atraumatic.      Right Ear: External ear normal.      Left Ear: External ear normal.      Nose: Nose normal.   Eyes:      Conjunctiva/sclera: Conjunctivae  normal.      Pupils: Pupils are equal, round, and reactive to light.   Neck:      Thyroid: No thyromegaly.   Cardiovascular:      Rate and Rhythm: Normal rate and regular rhythm.      Heart sounds: Normal heart sounds. No murmur heard.      Pulmonary:      Effort: Pulmonary effort is normal.      Breath sounds: Normal breath sounds.   Musculoskeletal:      Cervical back: Normal range of motion and neck supple.   Lymphadenopathy:      Cervical: No cervical adenopathy.   Neurological:      Mental Status: She is alert and oriented to person, place, and time.      Cranial Nerves: No cranial nerve deficit.      Sensory: No sensory deficit.      Motor: No tremor, atrophy or abnormal muscle tone.      Coordination: Coordination normal.      Gait: Gait normal.      Deep Tendon Reflexes: Reflexes normal.   Psychiatric:         Speech: Speech normal.         Behavior: Behavior normal.         Thought Content: Thought content normal.         Judgment: Judgment normal.         Vitals:    10/19/21 1018   BP: 150/90   Pulse: 58   Resp: 16   SpO2: 98%     Body mass index is 50 kg/m².    Procedures    Assessment/Plan   Problems Addressed this Visit     None      Visit Diagnoses     History of recent fall    -  Primary    Closed fracture of nasal bone with routine healing, subsequent encounter        Pain        Relevant Medications    traMADol (ULTRAM) 50 MG tablet    Arthritis of left knee        Relevant Medications    traMADol (ULTRAM) 50 MG tablet      Diagnoses       Codes Comments    History of recent fall    -  Primary ICD-10-CM: Z91.81  ICD-9-CM: V15.88     Closed fracture of nasal bone with routine healing, subsequent encounter     ICD-10-CM: S02.2XXD  ICD-9-CM: V54.19     Pain     ICD-10-CM: R52  ICD-9-CM: 780.96     Arthritis of left knee     ICD-10-CM: M17.12  ICD-9-CM: 716.96         Refill tramadol 50mg 1 p.o. BID PRN  Refill chlorhexidine         Return if symptoms worsen or fail to improve.

## 2021-10-19 NOTE — PATIENT INSTRUCTIONS
Call or return to clinic prn if these symptoms worsen or fail to improve as anticipated.  Call with any questions or concerns.

## 2021-11-02 ENCOUNTER — OFFICE VISIT (OUTPATIENT)
Dept: ORTHOPEDIC SURGERY | Facility: CLINIC | Age: 86
End: 2021-11-02

## 2021-11-02 VITALS — HEIGHT: 62 IN | WEIGHT: 193.4 LBS | BODY MASS INDEX: 35.59 KG/M2 | TEMPERATURE: 95.9 F

## 2021-11-02 DIAGNOSIS — R52 PAIN: ICD-10-CM

## 2021-11-02 DIAGNOSIS — M17.12 ARTHRITIS OF LEFT KNEE: ICD-10-CM

## 2021-11-02 PROCEDURE — 99212 OFFICE O/P EST SF 10 MIN: CPT | Performed by: NURSE PRACTITIONER

## 2021-11-02 RX ORDER — TRAMADOL HYDROCHLORIDE 50 MG/1
50 TABLET ORAL 2 TIMES DAILY PRN
Qty: 60 TABLET | Refills: 0 | Status: SHIPPED | OUTPATIENT
Start: 2021-11-02

## 2021-11-02 NOTE — PROGRESS NOTES
Patient: Ghada Ryan  YOB: 1935 86 y.o. female  Medical Record Number: [id number]    Chief Complaints:   Chief Complaint   Patient presents with   • Left Knee - Follow-up       History of Present Illness:Ghada Ryan is a 86 y.o. female who presents for follow-up of left knee pain/osteoarthritis that is chronic in nature.  Patient was scheduled for a left total knee replacement on October 15, 2021 and subsequently had a trip and fall a few days before her surgery and had to cancel.  Patient states that other than some bruising and some fractured teeth she is progressing well.  Patient reports that she is scheduled to see a dentist today to discuss her surgeries and fixing her teeth.  Patient is here today for reevaluation of her knee.    Allergies:   Allergies   Allergen Reactions   • Contrast Dye Hives       Medications:   Current Outpatient Medications   Medication Sig Dispense Refill   • albuterol (PROVENTIL HFA;VENTOLIN HFA) 108 (90 BASE) MCG/ACT inhaler Inhale 2 puffs Every 6 (Six) Hours As Needed.     • aspirin 81 MG tablet Take 81 mg by mouth. HOLDING FOR 7 DAYS PRIOR TO OR     • chlorhexidine (PERIDEX) 0.12 % solution Apply 15 mL to the mouth or throat 2 (Two) Times a Day. 60 mL 1   • Diclofenac Sodium (VOLTAREN) 1 % gel gel Apply 4 g topically to the appropriate area as directed 4 (Four) Times a Day As Needed (Left knee pain). (Patient taking differently: Apply 4 g topically to the appropriate area as directed 4 (Four) Times a Day As Needed (Left knee pain). HOLDING FOR 7 DAYS PRIOR TO OR) 350 g 0   • Fluticasone Furoate-Vilanterol (BREO ELLIPTA) 100-25 MCG/INH aerosol powder  Inhale 1 puff Daily.     • ibuprofen (ADVIL,MOTRIN) 600 MG tablet Take 600 mg by mouth Every 6 (Six) Hours As Needed for Mild Pain . HOLDING FOR OR 7 DAYS PRIOR     • levothyroxine (SYNTHROID, LEVOTHROID) 25 MCG tablet Take 1 tablet by mouth Daily. 30 tablet 1   • losartan (COZAAR) 100 MG tablet  "TAKE 1 TABLET BY MOUTH EVERY DAY (Patient taking differently: Take 100 mg by mouth Daily.) 90 tablet 3   • mupirocin (BACTROBAN) 2 % ointment Apply 1 application topically to the appropriate area as directed 3 (Three) Times a Day. USE AS DIRECTED     • omeprazole (priLOSEC) 20 MG capsule TAKE 1 CAPSULE BY MOUTH EVERY DAY (Patient taking differently: Take 20 mg by mouth Daily.) 90 capsule 3   • polyethylene glycol (MIRALAX) powder TAKE 17 G BY MOUTH DAILY. 255 g 0   • sertraline (ZOLOFT) 50 MG tablet TAKE 1 TABLET BY MOUTH EVERY DAY (Patient taking differently: Take 50 mg by mouth Every Night.) 90 tablet 3   • simvastatin (ZOCOR) 40 MG tablet TAKE 1 TABLET BY MOUTH EVERY DAY AT NIGHT (Patient taking differently: Take 40 mg by mouth Every Night.) 90 tablet 1   • timolol (TIMOPTIC) 0.5 % ophthalmic solution Administer 1 drop to both eyes 2 (Two) Times a Day.     • traMADol (ULTRAM) 50 MG tablet Take 1 tablet by mouth 2 (Two) Times a Day As Needed for Moderate Pain . 60 tablet 0   • traZODone (DESYREL) 50 MG tablet Take 50 mg by mouth 4 (Four) Times a Day As Needed for Sleep.       No current facility-administered medications for this visit.     Facility-Administered Medications Ordered in Other Visits   Medication Dose Route Frequency Provider Last Rate Last Admin   • Chlorhexidine Gluconate Cloth 2 % pads   Apply externally BID Demond Forrest MD             The following portions of the patient's history were reviewed and updated as appropriate: allergies, current medications, past family history, past medical history, past social history, past surgical history and problem list.    Review of Systems:   A 14 point review of systems was performed. All systems negative except pertinent positives/negative listed in HPI above    Physical Exam:   Vitals:    11/02/21 0803   Temp: 95.9 °F (35.5 °C)   TempSrc: Temporal   Weight: 87.7 kg (193 lb 6.4 oz)   Height: 157.5 cm (62\")       General: A and O x 3, ASA, NAD    SCLERA:    " Normal    DENTITION:   Normal    Knee:  left    ALIGNMENT:     Varus  ,   Patella  tracks  midline    GAIT:    Antalgic    SKIN:    No abnormality    RANGE OF MOTION:   5  -  125   DEG    STRENGTH:   4  / 5    LIGAMENTS:    No varus / valgus instability.   Negative  Lachman.    MENISCUS:     Negative   Beena       DISTAL PULSES:    Paplable    DISTAL SENSATION :   Intact    LYMPHATICS:     No   lymphadenopathy    OTHER:          - Positive   effusion      + Crepitance with ROM       Radiology:  Xrays 3views (ap,lateral, sunrise) taken previously demonstrating advanced varus osteoarthritis with bone on bone articulation, subchondral cysts, and periarticular osteophytes as well as severe patellofemoral osteoarthritis.  No new x-rays were taken today in clinic.    Assessment/Plan: Primary osteoarthritis of left knee    Patient seems to be progressing well from her fall.  Patient does have some bruising noted to her chin otherwise she has no open wounds or lacerations.  Patient states that she is going to let us know what kind of dental procedures she has to have and depending on the dental procedures we can put her back on the surgery schedule.  I have instructed her to call our surgery scheduler Dior once she has figured out what her plan is for dental surgery to be put back on the schedule for left total knee replacement.    Axel Núñez, APRN  11/02/2021

## 2021-11-14 DIAGNOSIS — F41.9 ANXIETY: ICD-10-CM

## 2021-12-12 DIAGNOSIS — E03.9 HYPOTHYROIDISM, UNSPECIFIED TYPE: ICD-10-CM

## 2021-12-13 RX ORDER — LEVOTHYROXINE SODIUM 0.03 MG/1
TABLET ORAL
Qty: 90 TABLET | Refills: 1 | Status: SHIPPED | OUTPATIENT
Start: 2021-12-13

## 2022-02-13 DIAGNOSIS — I10 ESSENTIAL HYPERTENSION: ICD-10-CM

## 2022-02-14 RX ORDER — SIMVASTATIN 40 MG
TABLET ORAL
Qty: 90 TABLET | Refills: 1 | Status: SHIPPED | OUTPATIENT
Start: 2022-02-14

## 2022-02-14 RX ORDER — LOSARTAN POTASSIUM 100 MG/1
TABLET ORAL
Qty: 90 TABLET | Refills: 3 | Status: SHIPPED | OUTPATIENT
Start: 2022-02-14

## 2022-02-17 ENCOUNTER — OFFICE VISIT (OUTPATIENT)
Dept: ORTHOPEDIC SURGERY | Facility: CLINIC | Age: 87
End: 2022-02-17

## 2022-02-17 VITALS — HEIGHT: 64 IN | WEIGHT: 193.2 LBS | BODY MASS INDEX: 32.98 KG/M2 | TEMPERATURE: 97.1 F

## 2022-02-17 DIAGNOSIS — M17.12 PRIMARY OSTEOARTHRITIS OF LEFT KNEE: ICD-10-CM

## 2022-02-17 DIAGNOSIS — R52 PAIN: Primary | ICD-10-CM

## 2022-02-17 PROCEDURE — 73562 X-RAY EXAM OF KNEE 3: CPT | Performed by: ORTHOPAEDIC SURGERY

## 2022-02-17 PROCEDURE — 99212 OFFICE O/P EST SF 10 MIN: CPT | Performed by: ORTHOPAEDIC SURGERY

## 2022-02-17 NOTE — PROGRESS NOTES
Patient: Ghada Ryan  YOB: 1935 86 y.o. female  Medical Record Number: [id number]    Chief Complaints:   Chief Complaint   Patient presents with   • Left Knee - Follow-up       History of Present Illness:Ghada Ryan is a 86 y.o. female who presents for follow-up of left knee pain which is severe in nature previously scheduled for knee replacement she had a fall busted her teeth out those have been repaired but her knee is still causing significant pain.  Unfortunately she is getting relocated to [place] soon.    Allergies:   Allergies   Allergen Reactions   • Contrast Dye Hives       Medications:   Current Outpatient Medications   Medication Sig Dispense Refill   • albuterol (PROVENTIL HFA;VENTOLIN HFA) 108 (90 BASE) MCG/ACT inhaler Inhale 2 puffs Every 6 (Six) Hours As Needed.     • aspirin 81 MG tablet Take 81 mg by mouth. HOLDING FOR 7 DAYS PRIOR TO OR     • chlorhexidine (PERIDEX) 0.12 % solution Apply 15 mL to the mouth or throat 2 (Two) Times a Day. 60 mL 1   • Diclofenac Sodium (VOLTAREN) 1 % gel gel Apply 4 g topically to the appropriate area as directed 4 (Four) Times a Day As Needed (Left knee pain). (Patient taking differently: Apply 4 g topically to the appropriate area as directed 4 (Four) Times a Day As Needed (Left knee pain). HOLDING FOR 7 DAYS PRIOR TO OR) 350 g 0   • Fluticasone Furoate-Vilanterol (BREO ELLIPTA) 100-25 MCG/INH aerosol powder  Inhale 1 puff Daily.     • ibuprofen (ADVIL,MOTRIN) 600 MG tablet Take 600 mg by mouth Every 6 (Six) Hours As Needed for Mild Pain . HOLDING FOR OR 7 DAYS PRIOR     • levothyroxine (SYNTHROID, LEVOTHROID) 25 MCG tablet TAKE 1 TABLET BY MOUTH EVERY DAY 90 tablet 1   • losartan (COZAAR) 100 MG tablet TAKE 1 TABLET BY MOUTH EVERY DAY 90 tablet 3   • mupirocin (BACTROBAN) 2 % ointment Apply 1 application topically to the appropriate area as directed 3 (Three) Times a Day. USE AS DIRECTED     • omeprazole (priLOSEC) 20 MG  "capsule TAKE 1 CAPSULE BY MOUTH EVERY DAY (Patient taking differently: Take 20 mg by mouth Daily.) 90 capsule 3   • polyethylene glycol (MIRALAX) powder TAKE 17 G BY MOUTH DAILY. 255 g 0   • sertraline (ZOLOFT) 50 MG tablet TAKE 1 TABLET BY MOUTH EVERY DAY 90 tablet 3   • simvastatin (ZOCOR) 40 MG tablet TAKE 1 TABLET BY MOUTH EVERY DAY AT NIGHT 90 tablet 1   • timolol (TIMOPTIC) 0.5 % ophthalmic solution Administer 1 drop to both eyes 2 (Two) Times a Day.     • traMADol (ULTRAM) 50 MG tablet Take 1 tablet by mouth 2 (Two) Times a Day As Needed for Moderate Pain . 60 tablet 0   • traZODone (DESYREL) 50 MG tablet Take 50 mg by mouth 4 (Four) Times a Day As Needed for Sleep.       No current facility-administered medications for this visit.     Facility-Administered Medications Ordered in Other Visits   Medication Dose Route Frequency Provider Last Rate Last Admin   • Chlorhexidine Gluconate Cloth 2 % pads   Apply externally BID Demond Forrest MD             The following portions of the patient's history were reviewed and updated as appropriate: allergies, current medications, past family history, past medical history, past social history, past surgical history and problem list.    Review of Systems:   A 14 point review of systems was performed. All systems negative except pertinent positives/negative listed in HPI above    Physical Exam:   Vitals:    02/17/22 1042   Temp: 97.1 °F (36.2 °C)   Weight: 87.6 kg (193 lb 3.2 oz)   Height: 162.6 cm (64\")       General: A and O x 3, ASA, NAD    SCLERA:    Normal    DENTITION:   Normal  Knee:  left    ALIGNMENT:     Varus  ,   Patella  tracks  midline    GAIT:    Antalgic    SKIN:    No abnormality    RANGE OF MOTION:   0  -  115   DEG    STRENGTH:   4  / 5    LIGAMENTS:    No varus / valgus instability.   Negative  Lachman.    MENISCUS:     Negative   Beena       DISTAL PULSES:    Paplable    DISTAL SENSATION :   Intact    LYMPHATICS:     No   lymphadenopathy    OTHER:   "        - Positive   effusion      - Crepitance with ROM       Radiology:  Xrays 3views left knee (ap,lateral, sunrise) were ordered and reviewed for evaluation of knee pain demonstratingadvanced varus osteoarthritis with bone on bone articulation, subchondral cysts, and periarticular osteophytes.  There is no fracture noted.  In comparison to previous films there is no change.      Assessment/Plan:  Left knee advanced OA was previously scheduled for knee replacement.  Unfortunately she is being relocated to [place] she wants the names of some knee replacement surgeons in [place] she is going to call back and send me an email I will do some research and give her a few names.

## 2022-04-04 ENCOUNTER — TELEPHONE (OUTPATIENT)
Dept: ORTHOPEDIC SURGERY | Facility: CLINIC | Age: 87
End: 2022-04-04

## 2022-04-04 NOTE — TELEPHONE ENCOUNTER
Patient called to leave an e-mail and phone number for you in regards to recommendations for a doctor in [place] for her.    [email or web address]  [phone]

## 2022-08-05 ENCOUNTER — READMISSION MANAGEMENT (OUTPATIENT)
Dept: CALL CENTER | Facility: HOSPITAL | Age: 87
End: 2022-08-05

## 2022-08-05 ENCOUNTER — TRANSITIONAL CARE MANAGEMENT TELEPHONE ENCOUNTER (OUTPATIENT)
Dept: CALL CENTER | Facility: HOSPITAL | Age: 87
End: 2022-08-05

## 2022-08-05 NOTE — OUTREACH NOTE
Prep Survey    Flowsheet Row Responses   [facility] facility patient discharged from? Non-BH   Is LACE score < 7 ? Non-BH Discharge   Emergency Room discharge w/ pulse ox? No   Eligibility [facility]   Date of Discharge 08/04/22   Discharge Disposition Home or Self Care   Discharge diagnosis Uknown   Does the patient have one of the following disease processes/diagnoses(primary or secondary)? Other   Prep survey completed? Yes          PRESLEY ROSS - Registered Nurse

## 2022-08-05 NOTE — OUTREACH NOTE
Call Center TCM Note    Flowsheet Row Responses   [facility] patient discharged from? Non-BH   Does the patient have one of the following disease processes/diagnoses(primary or secondary)? Other   TCM attempt successful? No  [No current verbal release on file]   Unsuccessful attempts Attempt 1   Revoked Reason Other  [Person who answered phone stated that patient no longer lives there. ]          Helen Meléndez RN    8/5/2022, 11:34 EDT

## 2024-02-19 NOTE — PLAN OF CARE
Problem: Perioperative Period (Adult)  Goal: Signs and Symptoms of Listed Potential Problems Will be Absent or Manageable (Perioperative Period)  Outcome: Ongoing (interventions implemented as appropriate)       <-- Click to add NO significant Past Surgical History

## 2024-06-24 NOTE — PATIENT INSTRUCTIONS
I have referred you to PT.    Try aspirin cream for your hands.    Detail Level: Detailed X Size Of Lesion In Cm (Optional): 0 Procedure To Be Performed At Next Visit: Excision Instructions (Optional): Sole right foot sole by 1 st toe dark macule  irregular color \\nSide of 1st right toe dark macule irregular edges\\n\\nR/o atypical Nevus Introduction Text (Please End With A Colon): The following procedure was deferred:

## 2024-08-03 NOTE — PROGRESS NOTES
Continued Stay Note  [facility]     Patient Name: Ghada Ryan  MRN: [id number]  Today's Date: 3/6/2019    Admit Date: 3/4/2019    Discharge Plan     Row Name 03/06/19 8734       Plan    Plan  Plans dc home with assist of friend/s. No needs noted. Continue to follow.    Patient/Family in Agreement with Plan  yes        Discharge Codes    No documentation.             Alyce Duran RN     Positive

## (undated) DEVICE — STPLR SKIN VISISTAT WD 35CT

## (undated) DEVICE — ENCORE® LATEX ORTHO SIZE 7.5, STERILE LATEX POWDER-FREE SURGICAL GLOVE: Brand: ENCORE

## (undated) DEVICE — GLV SURG SENSICARE W/ALOE PF LF 8 STRL

## (undated) DEVICE — DRSNG SURESITE WNDW 2.38X2.75

## (undated) DEVICE — VIOLET BRAIDED (POLYGLACTIN 910), SYNTHETIC ABSORBABLE SUTURE: Brand: COATED VICRYL

## (undated) DEVICE — DRSNG TELFA PAD NONADH STR 1S 3X4IN

## (undated) DEVICE — THE TORRENT IRRIGATION SCOPE CONNECTOR IS USED WITH THE TORRENT IRRIGATION TUBING TO PROVIDE IRRIGATION FLUIDS SUCH AS STERILE WATER DURING GASTROINTESTINAL ENDOSCOPIC PROCEDURES WHEN USED IN CONJUNCTION WITH AN IRRIGATION PUMP (OR ELECTROSURGICAL UNIT).: Brand: TORRENT

## (undated) DEVICE — SUT VIC 3/0 TIES 18IN J110T

## (undated) DEVICE — 3M™ STERI-STRIP™ REINFORCED ADHESIVE SKIN CLOSURES, R1547, 1/2 IN X 4 IN (12 MM X 100 MM), 6 STRIPS/ENVELOPE: Brand: 3M™ STERI-STRIP™

## (undated) DEVICE — SPNG LAP 18X18IN LF STRL PK/5

## (undated) DEVICE — PK ENT 40

## (undated) DEVICE — SPNG GZ WOVN 4X4IN 12PLY 10/BX STRL

## (undated) DEVICE — PREMIUM WET SKIN PREP TRAY: Brand: MEDLINE INDUSTRIES, INC.

## (undated) DEVICE — GLV SURG SENSICARE MICRO PF LF 7 STRL

## (undated) DEVICE — VISUALIZATION SYSTEM: Brand: CLEARIFY

## (undated) DEVICE — ENDOPATH XCEL BLADELESS TROCARS WITH STABILITY SLEEVES: Brand: ENDOPATH XCEL

## (undated) DEVICE — MEDI-VAC YANKAUER SUCTION HANDLE W/BULBOUS TIP: Brand: CARDINAL HEALTH

## (undated) DEVICE — 2S 2-0 BK MONO NYL 30"/75CM: Brand: 2S 2-0 BK MONO NYL 30"/75CM

## (undated) DEVICE — Device

## (undated) DEVICE — 2, DISPOSABLE SUCTION/IRRIGATOR WITH DISPOSABLE TIP: Brand: STRYKEFLOW

## (undated) DEVICE — DRSNG SURESITE WNDW 4X4.5

## (undated) DEVICE — SUT MNCRYL PLS ANTIB UD 4/0 PS2 18IN

## (undated) DEVICE — LAPAROSCOPIC GAS CONDITIONING DEVICE.: Brand: INSUFLOW

## (undated) DEVICE — SKIN PREP TRAY W/CHG: Brand: MEDLINE INDUSTRIES, INC.

## (undated) DEVICE — HARMONIC ACE +7 LAPAROSCOPIC SHEARS ADVANCED HEMOSTASIS 5MM DIAMETER 36CM SHAFT LENGTH  FOR USE WITH GRAY HAND PIECE ONLY: Brand: HARMONIC ACE

## (undated) DEVICE — SUT PROLN 2/0 SH 36IN 8523H

## (undated) DEVICE — SUT VIC 1 CT1 36IN J947H

## (undated) DEVICE — ACCESS PLATFORM FOR MINIMALLY INVASIVE SURGERY: Brand: GELPOINT® ADVANCED ACCESS PLATFORM

## (undated) DEVICE — ENDOPATH XCEL UNIVERSAL TROCAR STABLILITY SLEEVES: Brand: ENDOPATH XCEL

## (undated) DEVICE — GLV SURG BIOGEL LTX PF 7 1/2

## (undated) DEVICE — CONTAINER,SPECIMEN,OR STERILE,4OZ: Brand: MEDLINE

## (undated) DEVICE — MAGNETIC DRAPE: Brand: DEVON

## (undated) DEVICE — SUT VIC 3/0 CT2 27IN J232H

## (undated) DEVICE — TUBING, SUCTION, 1/4" X 10', STRAIGHT: Brand: MEDLINE

## (undated) DEVICE — SUT VIC 2/0 TIES 18IN J111T

## (undated) DEVICE — CANN NASL CO2 TRULINK W/O2 A/

## (undated) DEVICE — ST TISSOMAT SPRY

## (undated) DEVICE — MAT FLR ABSORBENT LG 4FT 10 2.5FT

## (undated) DEVICE — DRSNG WND BORDR/ADHS NONADHR/GZ LF 4X14IN STRL

## (undated) DEVICE — 3M™ IOBAN™ 2 ANTIMICROBIAL INCISE DRAPE 6640EZ: Brand: IOBAN™ 2

## (undated) DEVICE — NDL HYPO PRECISIONGLIDE REG 25G 1 1/2

## (undated) DEVICE — SUT VIC 0 CT1 36IN J946H

## (undated) DEVICE — APPL CHLORAPREP W/TINT 26ML ORNG

## (undated) DEVICE — TOTAL TRAY, 16FR 10ML SIL FOLEY, URN: Brand: MEDLINE

## (undated) DEVICE — LOU LAP SIGMOID COLON: Brand: MEDLINE INDUSTRIES, INC.

## (undated) DEVICE — Device: Brand: DEFENDO AIR/WATER/SUCTION AND BIOPSY VALVE

## (undated) DEVICE — SUT SILK 2/0 SH CR8 18IN CR8 C012D

## (undated) DEVICE — PK KN TOTL 40

## (undated) DEVICE — TUBING, SUCTION, 1/4" X 20', STRAIGHT: Brand: MEDLINE INDUSTRIES, INC.

## (undated) DEVICE — SUT VIC 0/0 UR6 27IN DYED J603H

## (undated) DEVICE — GLV SURG SENSICARE W/ALOE PF LF 7.5 STRL

## (undated) DEVICE — COVER,MAYO STAND,STERILE: Brand: MEDLINE

## (undated) DEVICE — ENDOCUT SCISSOR TIP, DISPOSABLE: Brand: RENEW

## (undated) DEVICE — PREP SOL POVIDONE/IODINE BT 4OZ

## (undated) DEVICE — DRSNG WND GZ CURAD OIL EMULSION 3X8IN LF STRL 1PK

## (undated) DEVICE — ELECTRD BLD EDGE/INSUL1P 2.4X5.1MM STRL

## (undated) DEVICE — SOL NACL 0.9PCT 100ML SGL

## (undated) DEVICE — UNDERCAST PADDING: Brand: DEROYAL

## (undated) DEVICE — GLV SURG SENSICARE PI PF LF 7 GRN STRL

## (undated) DEVICE — LAPAROSCOPIC SMOKE ELIMINATION DEVICE: Brand: PNEUVIEW XE

## (undated) DEVICE — PK PROC MAJ 40

## (undated) DEVICE — SINGLE-USE BIOPSY FORCEPS: Brand: RADIAL JAW 4

## (undated) DEVICE — DUAL CUT SAGITTAL BLADE

## (undated) DEVICE — COVER,TABLE,44X90,STERILE: Brand: MEDLINE

## (undated) DEVICE — SUT SILK 3/0 TIES 18IN A184H

## (undated) DEVICE — GLV SURG BIOGEL LTX PF 6 1/2

## (undated) DEVICE — 1842 FOAM BLOCK NEEDLE COUNTER: Brand: DEVON

## (undated) DEVICE — DISPOSABLE BIPOLAR FORCEPS 4" (10.2CM) JEWELERS, STRAIGHT 0.4MM TIP AND 12 FT. (3.6M) CABLE: Brand: KIRWAN

## (undated) DEVICE — DISPOSABLE GRASPER CARTRIDGE: Brand: DIRECT DRIVE REPOSABLE GRASPERS

## (undated) DEVICE — TBG 02 CRUSH RESIST LF CLR 7FT

## (undated) DEVICE — SUT VIC 5/0 PS2 18IN J495H

## (undated) DEVICE — ADHS SKIN DERMABOND TOP ADVANCED

## (undated) DEVICE — PICO SINGLE USE NEGATIVE PRESSURE WOUND THERAPY SYSTEM 10CM X 30CM 4IN. X 12IN.: Brand: PICO

## (undated) DEVICE — NDL HYPO ECLPS SFTY 22G 1 1/2IN

## (undated) DEVICE — BNDG ELAS ELITE V/CLOSE 6IN 5YD LF STRL

## (undated) DEVICE — APPL DURAPREP IODOPHOR APL 26ML

## (undated) DEVICE — SUT PDS 1 CT1 36IN Z347H

## (undated) DEVICE — SUT SILK 2/0 TIES 18IN A185H

## (undated) DEVICE — SKIN AFFIX SURG ADHESIVE 72/CS 0.55ML: Brand: MEDLINE

## (undated) DEVICE — NDL SPINE 22G 31/2IN BLK